# Patient Record
Sex: MALE | Race: WHITE | NOT HISPANIC OR LATINO | Employment: FULL TIME | URBAN - METROPOLITAN AREA
[De-identification: names, ages, dates, MRNs, and addresses within clinical notes are randomized per-mention and may not be internally consistent; named-entity substitution may affect disease eponyms.]

---

## 2015-03-17 LAB
CHOLEST SERPL-MCNC: 141 MG/DL (ref 0–200)
CREAT SERPL-MCNC: 0.89 MG/DL (ref 0.72–1.25)
GFR SERPL CREATININE-BSD FRML MDRD: >60 ML/MIN/1.73M2
GLUCOSE SERPL-MCNC: 120 MG/DL (ref 70–100)
HDLC SERPL-MCNC: 40 MG/DL
LDLC SERPL CALC-MCNC: 73 MG/DL (ref 0–159)
POTASSIUM SERPL-SCNC: 4.4 MMOL/L (ref 3.5–5.1)
TRIGL SERPL-MCNC: 130 MG/DL (ref 30–150)

## 2016-03-22 LAB
CREAT SERPL-MCNC: 0.82 MG/DL (ref 0.72–1.25)
GFR SERPL CREATININE-BSD FRML MDRD: >60 ML/MIN/1.73M2
POTASSIUM SERPL-SCNC: 4.1 MMOL/L (ref 3.5–5.1)

## 2017-04-18 ENCOUNTER — TRANSFERRED RECORDS (OUTPATIENT)
Dept: HEALTH INFORMATION MANAGEMENT | Facility: CLINIC | Age: 52
End: 2017-04-18

## 2017-04-18 LAB
CHOLEST SERPL-MCNC: 132 MG/DL (ref 0–200)
HDLC SERPL-MCNC: 42 MG/DL
LDLC SERPL CALC-MCNC: 67 MG/DL (ref 0–159)
TRIGL SERPL-MCNC: 114 MG/DL (ref 30–150)

## 2018-03-22 LAB — GLUCOSE SERPL-MCNC: 120 MG/DL (ref 70–100)

## 2018-04-27 NOTE — PROGRESS NOTES
"SUBJECTIVE:   CC: Delbert Dowling is an 52 year old male who presents for preventative health visit.     Physical   Annual:     Getting at least 3 servings of Calcium per day::  Yes    Bi-annual eye exam::  Yes    Dental care twice a year::  Yes    Sleep apnea or symptoms of sleep apnea::  None    Frequency of exercise::  1 day/week    Duration of exercise::  15-30 minutes    Taking medications regularly::  Yes    Medication side effects::  None    Additional concerns today::  No          New patient with prior care from [facility]. He has been with his partner for the past 13 years. He has one biological child and two step children. He is the  for  [employer].    His medical history includes hypertension and depression that is controlled with medication. His family history includes COPD and hypertension. His social history includes a 20-year history of smoking 1ppd and quit 12 years ago. He does not have a surgical history.    He reports that his weight has increased over the winter. He admits he does not follow a healthy diet. He states his alcohol intake is \"non-existent\".    Negative FIT over a year ago.         Hypertension Follow-up      Outpatient blood pressures are not being checked.    Low Salt Diet: no added salt    Depression Followup    Status since last visit: \"great\" medication is working well     See PHQ-9 for current symptoms.  Other associated symptoms: None    Complicating factors:   Significant life event:  No   Current substance abuse:  None  Anxiety or Panic symptoms:  No    PHQ-9 5/1/2018   Total Score 0   Q9: Suicide Ideation Not at all     In the past two weeks have you had thoughts of suicide or self-harm?  No.    Do you have concerns about your personal safety or the safety of others?   No  PHQ-9  English  PHQ-9   Any Language  Suicide Assessment Five-step Evaluation and Treatment (SAFE-T)    Today's PHQ-2 Score:   PHQ-2 ( 1999 [employer]) 5/1/2018   Q1: Little interest or " pleasure in doing things 0   Q2: Feeling down, depressed or hopeless 0   PHQ-2 Score 0   Q1: Little interest or pleasure in doing things Not at all   Q2: Feeling down, depressed or hopeless Not at all   PHQ-2 Score 0     Abuse: Current or Past(Physical, Sexual or Emotional)- No  Do you feel safe in your environment - Yes    Social History   Substance Use Topics     Smoking status: Former Smoker     Smokeless tobacco: Never Used     Alcohol use Yes      Comment: rare     Alcohol Use 5/1/2018   If you drink alcohol do you typically have greater than 3 drinks per day OR greater than 7 drinks per week? No       Last PSA:   PSA   Date Value Ref Range Status   05/01/2018 0.14 0 - 4 ug/L Final     Comment:     Assay Method:  Chemiluminescence using Siemens Vista analyzer       Reviewed orders with patient. Reviewed health maintenance and updated orders accordingly - Yes  BP Readings from Last 3 Encounters:   05/01/18 124/84    Wt Readings from Last 3 Encounters:   05/01/18 (!) 367 lb (166.5 kg)         Patient Active Problem List   Diagnosis     Episode of recurrent major depressive disorder, unspecified depression episode severity (H)     Essential hypertension     Morbid obesity (H)     Snoring     Past Surgical History:   Procedure Laterality Date     NO HISTORY OF SURGERY         Social History   Substance Use Topics     Smoking status: Former Smoker     Smokeless tobacco: Never Used     Alcohol use Yes      Comment: rare     Family History   Problem Relation Age of Onset     Chronic Obstructive Pulmonary Disease Mother      Hypertension Father          Current Outpatient Prescriptions   Medication Sig Dispense Refill     atenolol (TENORMIN) 100 MG tablet Take 1 tablet (100 mg) by mouth daily 90 tablet 3     buPROPion (WELLBUTRIN XL) 150 MG 24 hr tablet Take 1 tablet (150 mg) by mouth daily 90 tablet 3     citalopram (CELEXA) 40 MG tablet Take 1 tablet (40 mg) by mouth daily 90 tablet 3     hydrochlorothiazide  "(HYDRODIURIL) 25 MG tablet TAKE 1 TAB BY MOUTH ONCE DAILY IN THE MORNING 90 tablet 3     Multiple Vitamins-Minerals (MULTIVITAMIN ADULT PO)        Allergies   Allergen Reactions     Lisinopril Cough     Recent Labs   Lab Test  05/01/18   0832   LDL  63   HDL  47   TRIG  127   CR  0.83   GFRESTIMATED  >90   GFRESTBLACK  >90   POTASSIUM  3.9        Reviewed and updated as needed this visit by clinical staff  Tobacco  Allergies  Meds  Problems  Med Hx  Surg Hx  Fam Hx  Soc Hx          Reviewed and updated as needed this visit by Provider  Allergies  Meds  Problems            Review of Systems  C: NEGATIVE for fever, chills, change in weight  I: NEGATIVE for worrisome rashes, moles or lesions  E: NEGATIVE for vision changes or irritation  ENT: NEGATIVE for ear, mouth and throat problems  R: NEGATIVE for significant cough or SOB  CV: NEGATIVE for chest pain, palpitations or peripheral edema  GI: NEGATIVE for nausea, abdominal pain, heartburn, or change in bowel habits   male: negative for dysuria, hematuria, decreased urinary stream, erectile dysfunction, urethral discharge  M: NEGATIVE for significant arthralgias or myalgia  N: NEGATIVE for weakness, dizziness or paresthesias  P: NEGATIVE for changes in mood or affect    This document serves as a record of the services and decisions personally performed and made by Lissa Gamez CNP. It was created on her behalf by Yana Deutsch, a trained medical scribe. The creation of this document is based the provider's statements to the medical scribe.    Yana Deutsch May 1, 2018 7:57 AM    OBJECTIVE:   /84  Pulse 70  Temp 97  F (36.1  C) (Temporal)  Resp 12  Ht 5' 11.5\" (1.816 m)  Wt (!) 367 lb (166.5 kg)  BMI 50.47 kg/m2    Physical Exam  GENERAL: healthy, alert and no distress  EYES: Eyes grossly normal to inspection, PERRL and conjunctivae and sclerae normal  HENT: ear canals and TM's normal, nose and mouth without ulcers or lesions  NECK: no adenopathy, no " asymmetry, masses, or scars and thyroid normal to palpation  RESP: lungs clear to auscultation - no rales, rhonchi or wheezes  CV: regular rate and rhythm, normal S1 S2, no S3 or S4, no murmur, click or rub, no peripheral edema and peripheral pulses strong  ABDOMEN: soft, nontender, no hepatosplenomegaly, no masses and bowel sounds normal   (male): normal male genitalia without lesions or urethral discharge, no hernia  MS: no gross musculoskeletal defects noted, no edema  SKIN: no suspicious lesions or rashes  NEURO: Normal strength and tone, mentation intact and speech normal  PSYCH: mentation appears normal, affect normal/bright    ASSESSMENT/PLAN:       ICD-10-CM    1. Encounter for routine adult medical exam with abnormal findings Z00.01 **HIV Antigen Antibody Combo FUTURE 2mo     Lipid panel reflex to direct LDL Fasting     Basic metabolic panel     Prostate spec antigen screen     GASTROENTEROLOGY ADULT REF PROCEDURE ONLY [facility] [phone]; [place] Provider     CANCELED: GLUCOSE   2. Morbid obesity (H) E66.01    3. Essential hypertension I10 atenolol (TENORMIN) 100 MG tablet     hydrochlorothiazide (HYDRODIURIL) 25 MG tablet   4. Episode of recurrent major depressive disorder, unspecified depression episode severity (H) F33.9 citalopram (CELEXA) 40 MG tablet     buPROPion (WELLBUTRIN XL) 150 MG 24 hr tablet   5. Snoring R06.83 SLEEP EVALUATION & MANAGEMENT REFERRAL - ADULT -[facility] [place]  [phone] (Age 15 and up)   [facility]. He presents today for a routine physical that was completed today. Reviewed medical, family, and social history at length with patient today and was updated in Epic as needed. Fasting labs ordered for patient to complete today. Will notify with results via [employer].    Hypertension: Chronic, stable, well controlled, continue current medication, refill done if needed    Depression: Doing well. Tolerating  "medication. Medication refill provided today.    Weight management discussed. Urged to develop healthy habits, good nutrition, and increase physical activity. Offered a referral to see a nutritionist, but Pt declined and will work on weight management on his own.     Sleep study referral placed due to snoring. Also discussed health risks of snoring and that his elevated BMI is a contributing factor to his snoring. He will work on weight management.    Discussed need for colonoscopy that was scheduled today.  Discussed Shingrix vaccine and encouraged to complete this through his pharmacy.        COUNSELING:   Reviewed preventive health counseling, as reflected in patient instructions     reports that he has quit smoking. He has never used smokeless tobacco.    Estimated body mass index is 50.47 kg/(m^2) as calculated from the following:    Height as of this encounter: 5' 11.5\" (1.816 m).    Weight as of this encounter: 367 lb (166.5 kg).   Weight management plan: Discussed healthy diet and exercise guidelines and patient will follow up in 12 months in clinic to re-evaluate.    Counseling Resources:  ATP IV Guidelines  Pooled Cohorts Equation Calculator  FRAX Risk Assessment  ICSI Preventive Guidelines  Dietary Guidelines for Americans, 2010  USDA's MyPlate  ASA Prophylaxis  Lung CA Screening    The information in this document, created by the medical scribe for me, accurately reflects the services I personally performed and the decisions made by me. I have reviewed and approved this document for accuracy prior to leaving the patient care area.    NANDO [facility]  "

## 2018-05-01 ENCOUNTER — OFFICE VISIT (OUTPATIENT)
Dept: FAMILY MEDICINE | Facility: CLINIC | Age: 53
End: 2018-05-01
Payer: COMMERCIAL

## 2018-05-01 ENCOUNTER — TELEPHONE (OUTPATIENT)
Dept: SURGERY | Facility: CLINIC | Age: 53
End: 2018-05-01

## 2018-05-01 ENCOUNTER — TELEPHONE (OUTPATIENT)
Dept: FAMILY MEDICINE | Facility: CLINIC | Age: 53
End: 2018-05-01

## 2018-05-01 VITALS
SYSTOLIC BLOOD PRESSURE: 124 MMHG | BODY MASS INDEX: 42.66 KG/M2 | TEMPERATURE: 97 F | DIASTOLIC BLOOD PRESSURE: 84 MMHG | WEIGHT: 315 LBS | HEIGHT: 72 IN | HEART RATE: 70 BPM | RESPIRATION RATE: 12 BRPM

## 2018-05-01 DIAGNOSIS — R06.83 SNORING: ICD-10-CM

## 2018-05-01 DIAGNOSIS — E66.01 MORBID OBESITY (H): ICD-10-CM

## 2018-05-01 DIAGNOSIS — Z00.01 ENCOUNTER FOR ROUTINE ADULT MEDICAL EXAM WITH ABNORMAL FINDINGS: Primary | ICD-10-CM

## 2018-05-01 DIAGNOSIS — F33.9 EPISODE OF RECURRENT MAJOR DEPRESSIVE DISORDER, UNSPECIFIED DEPRESSION EPISODE SEVERITY (H): ICD-10-CM

## 2018-05-01 DIAGNOSIS — I10 ESSENTIAL HYPERTENSION: ICD-10-CM

## 2018-05-01 LAB
ANION GAP SERPL CALCULATED.3IONS-SCNC: 5 MMOL/L (ref 3–14)
BUN SERPL-MCNC: 16 MG/DL (ref 7–30)
CALCIUM SERPL-MCNC: 9 MG/DL (ref 8.5–10.1)
CHLORIDE SERPL-SCNC: 105 MMOL/L (ref 94–109)
CHOLEST SERPL-MCNC: 135 MG/DL
CO2 SERPL-SCNC: 30 MMOL/L (ref 20–32)
CREAT SERPL-MCNC: 0.83 MG/DL (ref 0.66–1.25)
GFR SERPL CREATININE-BSD FRML MDRD: >90 ML/MIN/1.7M2
GLUCOSE SERPL-MCNC: 114 MG/DL (ref 70–99)
HDLC SERPL-MCNC: 47 MG/DL
LDLC SERPL CALC-MCNC: 63 MG/DL
NONHDLC SERPL-MCNC: 88 MG/DL
POTASSIUM SERPL-SCNC: 3.9 MMOL/L (ref 3.4–5.3)
PSA SERPL-ACNC: 0.14 UG/L (ref 0–4)
SODIUM SERPL-SCNC: 140 MMOL/L (ref 133–144)
TRIGL SERPL-MCNC: 127 MG/DL

## 2018-05-01 PROCEDURE — G0103 PSA SCREENING: HCPCS | Performed by: NURSE PRACTITIONER

## 2018-05-01 PROCEDURE — 80048 BASIC METABOLIC PNL TOTAL CA: CPT | Performed by: NURSE PRACTITIONER

## 2018-05-01 PROCEDURE — 87389 HIV-1 AG W/HIV-1&-2 AB AG IA: CPT | Performed by: NURSE PRACTITIONER

## 2018-05-01 PROCEDURE — 99386 PREV VISIT NEW AGE 40-64: CPT | Performed by: NURSE PRACTITIONER

## 2018-05-01 PROCEDURE — 36415 COLL VENOUS BLD VENIPUNCTURE: CPT | Performed by: NURSE PRACTITIONER

## 2018-05-01 PROCEDURE — 80061 LIPID PANEL: CPT | Performed by: NURSE PRACTITIONER

## 2018-05-01 RX ORDER — CITALOPRAM HYDROBROMIDE 40 MG/1
40 TABLET ORAL DAILY
COMMUNITY
Start: 2018-04-09 | End: 2018-05-01

## 2018-05-01 RX ORDER — ATENOLOL 100 MG/1
100 TABLET ORAL DAILY
COMMUNITY
Start: 2018-04-09 | End: 2018-05-01

## 2018-05-01 RX ORDER — HYDROCHLOROTHIAZIDE 25 MG/1
TABLET ORAL
Qty: 90 TABLET | Refills: 3 | Status: SHIPPED | OUTPATIENT
Start: 2018-05-01 | End: 2019-03-13

## 2018-05-01 RX ORDER — CITALOPRAM HYDROBROMIDE 40 MG/1
40 TABLET ORAL DAILY
Qty: 90 TABLET | Refills: 3 | Status: SHIPPED | OUTPATIENT
Start: 2018-05-01 | End: 2019-03-13

## 2018-05-01 RX ORDER — BUPROPION HYDROCHLORIDE 150 MG/1
150 TABLET ORAL DAILY
COMMUNITY
Start: 2018-04-09 | End: 2018-05-01

## 2018-05-01 RX ORDER — HYDROCHLOROTHIAZIDE 25 MG/1
TABLET ORAL
COMMUNITY
Start: 2018-04-09 | End: 2018-05-01

## 2018-05-01 RX ORDER — ATENOLOL 100 MG/1
100 TABLET ORAL DAILY
Qty: 90 TABLET | Refills: 3 | Status: SHIPPED | OUTPATIENT
Start: 2018-05-01 | End: 2019-03-13

## 2018-05-01 RX ORDER — BUPROPION HYDROCHLORIDE 150 MG/1
150 TABLET ORAL DAILY
Qty: 90 TABLET | Refills: 3 | Status: SHIPPED | OUTPATIENT
Start: 2018-05-01 | End: 2019-03-13

## 2018-05-01 ASSESSMENT — PAIN SCALES - GENERAL: PAINLEVEL: NO PAIN (0)

## 2018-05-01 NOTE — MR AVS SNAPSHOT
After Visit Summary   5/1/2018    Delbert Dowling    MRN: [id number]           Patient Information     Date Of Birth          1965        Visit Information        Provider Department      5/1/2018 7:40 AM Lissa Gamez APRN [facility]        Today's Diagnoses     Morbid obesity (H)    -  1    Encounter for routine adult medical exam with abnormal findings        Essential hypertension        Episode of recurrent major depressive disorder, unspecified depression episode severity (H)        Snoring          Care Instructions      Preventive Health Recommendations  Male Ages 50 - 64    Yearly exam:             See your health care provider every year in order to  o   Review health changes.   o   Discuss preventive care.    o   Review your medicines if your doctor has prescribed any.     Have a cholesterol test every 5 years, or more frequently if you are at risk for high cholesterol/heart disease.     Have a diabetes test (fasting glucose) every three years. If you are at risk for diabetes, you should have this test more often.     Have a colonoscopy at age 50, or have a yearly FIT test (stool test). These exams will check for colon cancer.      Talk with your health care provider about whether or not a prostate cancer screening test (PSA) is right for you.    You should be tested each year for STDs (sexually transmitted diseases), if you re at risk.     Shots: Get a flu shot each year. Get a tetanus shot every 10 years.     Nutrition:    Eat at least 5 servings of fruits and vegetables daily.     Eat whole-grain bread, whole-wheat pasta and brown rice instead of white grains and rice.     Talk to your provider about Calcium and Vitamin D.     Lifestyle    Exercise for at least 150 minutes a week (30 minutes a day, 5 days a week). This will help you control your weight and prevent disease.     Limit alcohol to one drink per day.     No smoking.     Wear sunscreen to prevent skin  cancer.     See your dentist every six months for an exam and cleaning.     See your eye doctor every 1 to 2 years.            Follow-ups after your visit        Additional Services     GASTROENTEROLOGY ADULT REF PROCEDURE ONLY [facility] [phone]; [place] Provider       Last Lab Result: No results found for: CR  Body mass index is 50.47 kg/(m^2).    Wants a Monday procedure.     Patient will be contacted to schedule procedure.     Please be aware that coverage of these services is subject to the terms and limitations of your health insurance plan.  Call member services at your health plan with any benefit or coverage questions.  Any procedures must be performed at a [place] facility OR coordinated by your clinic's referral office.    Please bring the following with you to your appointment:    (1) Any X-Rays, CTs or MRIs which have been performed.  Contact the facility where they were done to arrange for  prior to your scheduled appointment.    (2) List of current medications   (3) This referral request   (4) Any documents/labs given to you for this referral            SLEEP EVALUATION & MANAGEMENT REFERRAL - [facility]  [phone] (Age 15 and up)       Please be aware that coverage of these services is subject to the terms and limitations of your health insurance plan.  Call member services at your health plan with any benefit or coverage questions.      Please bring the following to your appointment:    >>   List of current medications   >>   This referral request   >>   Any documents/labs given to you for this referral                      Future tests that were ordered for you today     Open Future Orders        Priority Expected Expires Ordered    SLEEP EVALUATION & MANAGEMENT REFERRAL - [facility]  [phone] (Age 15 and up) Routine  5/1/2019 5/1/2018            Who to contact     If you have questions or need  "follow up information about today's clinic visit or your schedule please contact [facility] directly at [phone].  Normal or non-critical lab and imaging results will be communicated to you by [employer]hart, letter or phone within 4 business days after the clinic has received the results. If you do not hear from us within 7 days, please contact the clinic through [employer]hart or phone. If you have a critical or abnormal lab result, we will notify you by phone as soon as possible.  Submit refill requests through [employer] or call your pharmacy and they will forward the refill request to us. Please allow 3 business days for your refill to be completed.          Additional Information About Your Visit        [employer] Information     [employer] gives you secure access to your electronic health record. If you see a primary care provider, you can also send messages to your care team and make appointments. If you have questions, please call your primary care clinic.  If you do not have a primary care provider, please call [phone] and they will assist you.        Care EveryWhere ID     This is your Care EveryWhere ID. This could be used by other organizations to access your [facility] medical records  [id number]        Your Vitals Were     Pulse Temperature Respirations Height BMI (Body Mass Index)       70 97  F (36.1  C) (Temporal) 12 5' 11.5\" (1.816 m) 50.47 kg/m2        Blood Pressure from Last 3 Encounters:   05/01/18 124/84    Weight from Last 3 Encounters:   05/01/18 (!) 367 lb (166.5 kg)              We Performed the Following     **HIV Antigen Antibody Combo FUTURE 2mo     Basic metabolic panel     GASTROENTEROLOGY ADULT REF PROCEDURE ONLY [facility] [phone]; [place] Provider     Lipid panel reflex to direct LDL Fasting     Prostate spec antigen screen          Today's Medication Changes          These changes are accurate as of 5/1/18  8:29 AM.  If you have any questions, ask your nurse or " doctor.               These medicines have changed or have updated prescriptions.        Dose/Directions    hydrochlorothiazide 25 MG tablet   Commonly known as:  HYDRODIURIL   This may have changed:  See the new instructions.   Used for:  Essential hypertension   Changed by:  Lissa Gamez APRN CNP        TAKE 1 TAB BY MOUTH ONCE DAILY IN THE MORNING   Quantity:  90 tablet   Refills:  3            Where to get your medicines      These medications were sent to [facility] [address] [facility] - [place], [place] - [address]  [address], [facility] [address]     Phone:  [phone]     atenolol 100 MG tablet    buPROPion 150 MG 24 hr tablet    citalopram 40 MG tablet    hydrochlorothiazide 25 MG tablet                Primary Care Provider Office Phone # Fax #    NANDO Lorenzo [phone] [address] [facility] [address]        Equal Access to Services     [facility]: Hadii aad ku hadasho Soomaali, waaxda luqadaha, qaybta kaalmada adeegyada, sandra hickmanin hayaatani camargo . So [facility] [phone].    ATENCIÓN: Si habla español, tiene a klein disposición servicios gratuitos de asistencia lingüística. LlKettering Health Preble [phone].    We comply with applicable federal civil rights laws and [place] laws. We do not discriminate on the basis of race, color, national origin, age, disability, sex, sexual orientation, or gender identity.            Thank you!     Thank you for choosing [facility]  for your care. Our goal is always to provide you with excellent care. Hearing back from our patients is one way we can continue to improve our services. Please take a few minutes to complete the written survey that you may receive in the mail after your visit with us. Thank you!             Your Updated Medication List - Protect others around you: Learn how to safely use, store and throw away your medicines at www.disposemymeds.org.          This list is accurate as of 5/1/18  8:29 AM.  Always  use your most recent med list.                   Brand Name Dispense Instructions for use Diagnosis    atenolol 100 MG tablet    TENORMIN    90 tablet    Take 1 tablet (100 mg) by mouth daily    Essential hypertension       buPROPion 150 MG 24 hr tablet    WELLBUTRIN XL    90 tablet    Take 1 tablet (150 mg) by mouth daily    Episode of recurrent major depressive disorder, unspecified depression episode severity (H)       citalopram 40 MG tablet    celeXA    90 tablet    Take 1 tablet (40 mg) by mouth daily    Episode of recurrent major depressive disorder, unspecified depression episode severity (H)       hydrochlorothiazide 25 MG tablet    HYDRODIURIL    90 tablet    TAKE 1 TAB BY MOUTH ONCE DAILY IN THE MORNING    Essential hypertension       MULTIVITAMIN ADULT PO

## 2018-05-01 NOTE — TELEPHONE ENCOUNTER
Pa from [facility] called to schedule a colonoscopy for patient. Orders placed by Renaldo. Dx: screening. Patient is scheduled on 5/7/2018 @ 230 w/Bobby, arriving @ 130. Pa stated she would provide the prep instructions.

## 2018-05-02 ENCOUNTER — TELEPHONE (OUTPATIENT)
Dept: FAMILY MEDICINE | Facility: CLINIC | Age: 53
End: 2018-05-02

## 2018-05-02 PROBLEM — R06.83 SNORING: Status: ACTIVE | Noted: 2018-05-02

## 2018-05-02 LAB — HIV 1+2 AB+HIV1 P24 AG SERPL QL IA: NONREACTIVE

## 2018-05-02 ASSESSMENT — PATIENT HEALTH QUESTIONNAIRE - PHQ9: SUM OF ALL RESPONSES TO PHQ QUESTIONS 1-9: 0

## 2018-05-07 ENCOUNTER — HOSPITAL ENCOUNTER (OUTPATIENT)
Facility: CLINIC | Age: 53
Discharge: HOME | End: 2018-05-07
Attending: INTERNAL MEDICINE | Admitting: INTERNAL MEDICINE
Payer: COMMERCIAL

## 2018-05-07 ENCOUNTER — SURGERY (OUTPATIENT)
Age: 53
End: 2018-05-07

## 2018-05-07 VITALS
DIASTOLIC BLOOD PRESSURE: 71 MMHG | OXYGEN SATURATION: 93 % | HEART RATE: 69 BPM | SYSTOLIC BLOOD PRESSURE: 124 MMHG | TEMPERATURE: 98 F | RESPIRATION RATE: 16 BRPM

## 2018-05-07 LAB — COLONOSCOPY: NORMAL

## 2018-05-07 PROCEDURE — 25000128 H RX IP 250 OP 636: Performed by: INTERNAL MEDICINE

## 2018-05-07 PROCEDURE — 45385 COLONOSCOPY W/LESION REMOVAL: CPT | Mod: PT | Performed by: INTERNAL MEDICINE

## 2018-05-07 PROCEDURE — 88305 TISSUE EXAM BY PATHOLOGIST: CPT | Mod: 26 | Performed by: INTERNAL MEDICINE

## 2018-05-07 PROCEDURE — G0500 MOD SEDAT ENDO SERVICE >5YRS: HCPCS

## 2018-05-07 PROCEDURE — 40000296 ZZH STATISTIC ENDO RECOVERY CLASS 1:2 FIRST HOUR: Performed by: INTERNAL MEDICINE

## 2018-05-07 PROCEDURE — 88305 TISSUE EXAM BY PATHOLOGIST: CPT | Performed by: INTERNAL MEDICINE

## 2018-05-07 RX ORDER — FENTANYL CITRATE 50 UG/ML
INJECTION, SOLUTION INTRAMUSCULAR; INTRAVENOUS PRN
Status: DISCONTINUED | OUTPATIENT
Start: 2018-05-07 | End: 2018-05-07 | Stop reason: HOSPADM

## 2018-05-07 RX ORDER — ONDANSETRON 2 MG/ML
4 INJECTION INTRAMUSCULAR; INTRAVENOUS
Status: DISCONTINUED | OUTPATIENT
Start: 2018-05-07 | End: 2018-05-07 | Stop reason: HOSPADM

## 2018-05-07 RX ORDER — LIDOCAINE 40 MG/G
CREAM TOPICAL
Status: DISCONTINUED | OUTPATIENT
Start: 2018-05-07 | End: 2018-05-07 | Stop reason: HOSPADM

## 2018-05-07 RX ADMIN — FENTANYL CITRATE 50 MCG: 50 INJECTION, SOLUTION INTRAMUSCULAR; INTRAVENOUS at 14:52

## 2018-05-07 RX ADMIN — MIDAZOLAM 1 MG: 1 INJECTION INTRAMUSCULAR; INTRAVENOUS at 14:53

## 2018-05-07 RX ADMIN — MIDAZOLAM 1 MG: 1 INJECTION INTRAMUSCULAR; INTRAVENOUS at 14:55

## 2018-05-07 RX ADMIN — MIDAZOLAM 2 MG: 1 INJECTION INTRAMUSCULAR; INTRAVENOUS at 14:52

## 2018-05-07 RX ADMIN — MIDAZOLAM 1 MG: 1 INJECTION INTRAMUSCULAR; INTRAVENOUS at 14:54

## 2018-05-07 NOTE — CONSULTS
[facility] GI Pre-Procedure Physical Assessment    Delbert Dowling MRN# [id number]   Age: 52 year old YOB: 1965      Date of Surgery: 5/7/2018  Location [facility]      Date of Exam 5/7/2018 Facility (Same day)       Primary care provider: Lissa Gamez         Active problem list:   Patient Active Problem List   Diagnosis     Episode of recurrent major depressive disorder, unspecified depression episode severity (H)     Essential hypertension     Morbid obesity (H)     Snoring            Medications (include herbals and vitamins):   Any Plavix use in the last 7 days?  No     Current Facility-Administered Medications   Medication     lidocaine (LMX4) kit     lidocaine 1 % 1 mL     ondansetron (ZOFRAN) injection 4 mg     sodium chloride (PF) 0.9% PF flush 3 mL     sodium chloride (PF) 0.9% PF flush 3 mL             Allergies:      Allergies   Allergen Reactions     Lisinopril Cough     Allergy to Latex?  No  Allergy to tape?    No          Social History:     Social History   Substance Use Topics     Smoking status: Former Smoker     Smokeless tobacco: Never Used     Alcohol use Yes      Comment: rare            Physical Exam:   All vitals have been reviewed  Blood pressure 121/68, pulse 63, temperature 98  F (36.7  C), temperature source Oral, resp. rate 16, SpO2 94 %.  Airway assessment:   Patient is able to stick out tongue      Lungs:   No increased work of breathing, good air exchange, clear to auscultation bilaterally, no crackles or wheezing      Cardiovascular:   Normal apical impulse, regular rate and rhythm, normal S1 and S2, no S3 or S4, and no murmur noted           Lab / Radiology Results:   All laboratory data reviewed          Assessment:   Appropriately NPO  Chief complaint or anatomic assessment of involved area: Screening         Plan:   Moderate (conscious) sedation     Risks, benefits, alternatives to sedation and blood explained and consent obtained  Risks,  benefits, alternatives to procedure explained and consent obtained  Orders and progress notes are in the chart  Discharge from Phase 1 and / or Phase 2 recovery when patient meets criteria    I have reviewed the history and physical, lab finding(s), diagnostic data, medicaitons, and the plan for sedation.  I have determined this patient to be an appropriate candidate for the planned sedation / procedure and have reassessed the patient immediately prior to sedation / procedure.    I have personally and medically directed the administration of medications used.    Dio Rosales MD, MD

## 2018-05-09 LAB — COPATH REPORT: NORMAL

## 2019-01-24 ENCOUNTER — TELEPHONE (OUTPATIENT)
Dept: FAMILY MEDICINE | Facility: CLINIC | Age: 54
End: 2019-01-24

## 2019-01-24 NOTE — TELEPHONE ENCOUNTER
Reason for call:  Form  Reason for Call:  Form, our goal is to have forms completed with 72 hours, however, some forms may require a visit or additional information.    Type of letter, form or note:  Medical    Who is the form from?: [employer]      Where did the form come from: form was faxed in    What clinic location was the form placed at?: [facility] - [phone]    Where the form was placed: 's in box     What number is listed as a contact on the form?: [phone]       Additional comments: Fax back to [phone]    Call taken on 1/24/2019 at 2:10 PM by Gerry Acevedo

## 2019-03-13 ENCOUNTER — TELEPHONE (OUTPATIENT)
Dept: FAMILY MEDICINE | Facility: CLINIC | Age: 54
End: 2019-03-13

## 2019-03-13 ENCOUNTER — OFFICE VISIT (OUTPATIENT)
Dept: FAMILY MEDICINE | Facility: CLINIC | Age: 54
End: 2019-03-13
Payer: COMMERCIAL

## 2019-03-13 ENCOUNTER — ANCILLARY PROCEDURE (OUTPATIENT)
Dept: GENERAL RADIOLOGY | Facility: CLINIC | Age: 54
End: 2019-03-13
Attending: NURSE PRACTITIONER
Payer: COMMERCIAL

## 2019-03-13 VITALS
DIASTOLIC BLOOD PRESSURE: 84 MMHG | SYSTOLIC BLOOD PRESSURE: 124 MMHG | TEMPERATURE: 97.7 F | OXYGEN SATURATION: 97 % | HEIGHT: 72 IN | RESPIRATION RATE: 18 BRPM | WEIGHT: 315 LBS | BODY MASS INDEX: 42.66 KG/M2 | HEART RATE: 66 BPM

## 2019-03-13 DIAGNOSIS — I10 ESSENTIAL HYPERTENSION: ICD-10-CM

## 2019-03-13 DIAGNOSIS — S99.912A ANKLE INJURY, LEFT, INITIAL ENCOUNTER: ICD-10-CM

## 2019-03-13 DIAGNOSIS — S82.202A CLOSED FRACTURE OF LEFT TIBIA AND FIBULA, INITIAL ENCOUNTER: Primary | ICD-10-CM

## 2019-03-13 DIAGNOSIS — F33.9 EPISODE OF RECURRENT MAJOR DEPRESSIVE DISORDER, UNSPECIFIED DEPRESSION EPISODE SEVERITY (H): ICD-10-CM

## 2019-03-13 DIAGNOSIS — S82.402A CLOSED FRACTURE OF LEFT TIBIA AND FIBULA, INITIAL ENCOUNTER: Primary | ICD-10-CM

## 2019-03-13 DIAGNOSIS — E66.01 MORBID OBESITY (H): ICD-10-CM

## 2019-03-13 PROCEDURE — 73610 X-RAY EXAM OF ANKLE: CPT | Mod: LT

## 2019-03-13 PROCEDURE — 99214 OFFICE O/P EST MOD 30 MIN: CPT | Performed by: NURSE PRACTITIONER

## 2019-03-13 RX ORDER — CITALOPRAM HYDROBROMIDE 40 MG/1
40 TABLET ORAL DAILY
Qty: 90 TABLET | Refills: 0 | Status: SHIPPED | OUTPATIENT
Start: 2019-03-13 | End: 2019-06-25

## 2019-03-13 RX ORDER — HYDROCHLOROTHIAZIDE 25 MG/1
TABLET ORAL
Qty: 90 TABLET | Refills: 0 | Status: SHIPPED | OUTPATIENT
Start: 2019-03-13 | End: 2019-06-25

## 2019-03-13 RX ORDER — ATENOLOL 100 MG/1
100 TABLET ORAL DAILY
Qty: 90 TABLET | Refills: 0 | Status: SHIPPED | OUTPATIENT
Start: 2019-03-13 | End: 2019-06-25

## 2019-03-13 RX ORDER — BUPROPION HYDROCHLORIDE 150 MG/1
150 TABLET ORAL DAILY
Qty: 90 TABLET | Refills: 0 | Status: SHIPPED | OUTPATIENT
Start: 2019-03-13 | End: 2019-06-25

## 2019-03-13 RX ORDER — HYDROCODONE BITARTRATE AND ACETAMINOPHEN 5; 325 MG/1; MG/1
1-2 TABLET ORAL EVERY 6 HOURS PRN
Qty: 10 TABLET | Refills: 0 | Status: ON HOLD | OUTPATIENT
Start: 2019-03-13 | End: 2019-03-22

## 2019-03-13 ASSESSMENT — ANXIETY QUESTIONNAIRES
3. WORRYING TOO MUCH ABOUT DIFFERENT THINGS: NOT AT ALL
1. FEELING NERVOUS, ANXIOUS, OR ON EDGE: NOT AT ALL
2. NOT BEING ABLE TO STOP OR CONTROL WORRYING: NOT AT ALL
6. BECOMING EASILY ANNOYED OR IRRITABLE: NOT AT ALL
7. FEELING AFRAID AS IF SOMETHING AWFUL MIGHT HAPPEN: NOT AT ALL
IF YOU CHECKED OFF ANY PROBLEMS ON THIS QUESTIONNAIRE, HOW DIFFICULT HAVE THESE PROBLEMS MADE IT FOR YOU TO DO YOUR WORK, TAKE CARE OF THINGS AT HOME, OR GET ALONG WITH OTHER PEOPLE: NOT DIFFICULT AT ALL
GAD7 TOTAL SCORE: 0
5. BEING SO RESTLESS THAT IT IS HARD TO SIT STILL: NOT AT ALL

## 2019-03-13 ASSESSMENT — PATIENT HEALTH QUESTIONNAIRE - PHQ9
SUM OF ALL RESPONSES TO PHQ QUESTIONS 1-9: 0
5. POOR APPETITE OR OVEREATING: NOT AT ALL

## 2019-03-13 ASSESSMENT — MIFFLIN-ST. JEOR: SCORE: 2561.09

## 2019-03-13 ASSESSMENT — PAIN SCALES - GENERAL: PAINLEVEL: MODERATE PAIN (4)

## 2019-03-13 NOTE — TELEPHONE ENCOUNTER
The crutch was given to patient while walking out to the lobby the crutch broke, patient fell. Called the  home medical equipment and informed them. Per the  home medical equipment pt need heavy duty crutch. Pt was informed and understood. Pt states that he do not need a new crutch and will borrow from family members.     Kolby Ceron MA

## 2019-03-13 NOTE — PROGRESS NOTES
SUBJECTIVE:   Delbert Dowling is a 53 year old male who presents to clinic today for the following health issues:    HPI     Left ankle     Onset: This morning  At 6:30 AM    Description: Patient slipped down his front steps outside.   Location: left ankle  Character: Sharp pain while moving. Throbbing pain while sitting.     Intensity: 4/10    Progression of Symptoms: worse    Accompanying Signs & Symptoms:  Other symptoms: no tingling , no numbness.     History:   Previous similar pain: no       Precipitating factors:   Trauma or overuse: YES    Alleviating factors:  Improved by:  Ice and Naproxen.     Therapies Tried and outcome: Ice and Naproxen.     Left Ankle Injury   He is able to get on the exam table independently. When he felt like his toe got stuck on one of the steps and his whole foot moved back, and leg tucked under. He is feeling numbness in his toes it is painful when he is standing on it  No foot pain.    He was heading to downstairs. He has not injured left ankle in the past. Pt has a sedentary lifestyle.     Meds/Screenings   Pt wanted to come in for a med check this month either way. He only had a coffee today, he will make an appointment for May. Pt has completed his colonoscopy, which went well.     Hypertension Follow-up      Outpatient blood pressures are not being checked.    Low Salt Diet: no added salt    Problem list and histories reviewed & adjusted, as indicated.  Additional history: as documented    Patient Active Problem List   Diagnosis     Episode of recurrent major depressive disorder, unspecified depression episode severity (H)     Essential hypertension     Morbid obesity (H)     Snoring     Closed fracture of left tibia and fibula, initial encounter     Past Surgical History:   Procedure Laterality Date     NO HISTORY OF SURGERY         Social History     Tobacco Use     Smoking status: Former Smoker     Packs/day: 1.00     Years: 20.00     Pack years: 20.00     Smokeless tobacco:  Never Used     Tobacco comment: quit 13 years    Substance Use Topics     Alcohol use: Yes     Comment: none      Family History   Problem Relation Age of Onset     Chronic Obstructive Pulmonary Disease Mother      Hypertension Father          Current Outpatient Medications   Medication Sig Dispense Refill     atenolol (TENORMIN) 100 MG tablet Take 1 tablet (100 mg) by mouth daily 90 tablet 0     buPROPion (WELLBUTRIN XL) 150 MG 24 hr tablet Take 1 tablet (150 mg) by mouth daily 90 tablet 0     citalopram (CELEXA) 40 MG tablet Take 1 tablet (40 mg) by mouth daily 90 tablet 0     hydrochlorothiazide (HYDRODIURIL) 25 MG tablet TAKE 1 TAB BY MOUTH ONCE DAILY IN THE MORNING 90 tablet 0     HYDROcodone-acetaminophen (NORCO) 5-325 MG tablet Take 1-2 tablets by mouth every 6 hours as needed for pain 10 tablet 0     Multiple Vitamins-Minerals (MULTIVITAMIN ADULT PO)        Allergies   Allergen Reactions     Lisinopril Cough     Recent Labs   Lab Test 05/01/18  0832 04/18/17 03/22/16 03/17/15   LDL 63 67  --  73   HDL 47 42  --  40   TRIG 127 114  --  130   CR 0.83  --  0.82 0.89   GFRESTIMATED >90  --  >60 >60   GFRESTBLACK >90  --   --  >60   POTASSIUM 3.9  --  4.1 4.4      BP Readings from Last 3 Encounters:   03/13/19 124/84   05/07/18 124/71   05/01/18 124/84    Wt Readings from Last 3 Encounters:   03/13/19 (!) 168.1 kg (370 lb 9.6 oz)   05/01/18 (!) 166.5 kg (367 lb)        Labs reviewed in EPIC    ROS:  Constitutional, neuro, ENT, endocrine, pulmonary, cardiac, gastrointestinal, genitourinary, musculoskeletal, integument and psychiatric systems are negative, except as otherwise noted.    This document serves as a record of the services and decisions personally performed and made by Lissa Gamez CNP. It was created on his/her behalf by Alicia Esquivel, trained medical scribe. The creation of this document is based the provider's statements to the medical scribes.    Scribe Alicia Esquivel 8:13 AM, March 13,  "2019    OBJECTIVE:                                                    /84   Pulse 66   Temp 97.7  F (36.5  C) (Oral)   Resp 18   Ht 1.816 m (5' 11.5\")   Wt (!) 168.1 kg (370 lb 9.6 oz)   SpO2 97%   BMI 50.97 kg/m    Body mass index is 50.97 kg/m .  GENERAL APPEARANCE: healthy, alert and no distress  MS: Left ankle: tender in the distal fibula above the meliolus, moderate swelling, no ecchymosis has yet appeared, good ROM,  large antalgic gate, not able to bear weight, no metatarsal tenderness  NEURO: Normal strength and tone, mentation intact and speech normal  PSYCH: mentation appears normal and affect normal/bright    Diagnostic test results:  No results found for this or any previous visit (from the past 24 hour(s)).     ASSESSMENT/PLAN:                                                        ICD-10-CM    1. Closed fracture of left tibia and fibula, initial encounter S82.202A HYDROcodone-acetaminophen (NORCO) 5-325 MG tablet    S82.402A ORTHO  REFERRAL   2. Morbid obesity (H) E66.01    3. Episode of recurrent major depressive disorder, unspecified depression episode severity (H) F33.9 buPROPion (WELLBUTRIN XL) 150 MG 24 hr tablet     citalopram (CELEXA) 40 MG tablet   4. Ankle injury, left, initial encounter S99.912A XR Ankle Left G/E 3 Views   5. Essential hypertension I10 atenolol (TENORMIN) 100 MG tablet     hydrochlorothiazide (HYDRODIURIL) 25 MG tablet     Left ankle injury, X-ray of left ankle completed, fractured distal fibula above the metatarsal.    Demonstrated home care and edema control, recommended staying home and using crutches to move around. Absolute no weight bearing discussed and taught- at length.  Recommended buying a shower chair to limit foot movement to a minimum- may take boot off for seated shower for 10 min 1-2 times/week..   Crutches provided, pt broke the first pair, was recommended to  another pair at the [facility]. Pt will borrow them from a " relative.    Ortho consult. Miguel recommend. OV next week with weight bearing X-rays to eval need for further intervention/surgery at that time.   He is willing to have RX 10 pain killers just in case of need, discussed NSAIDS- limited use with HTN, and tylenol dosing. Pt is worried about getting a blood clot after his injury. Warning signs discussed, advised right LE exercises throughout the day.      Depression- stable, improving. Pt reports doing well on his current dose of of Wellbutrin XL 150MG and Celexa 40MG. Refills provided.     Hypertension- controlled, will continue with the current plan. Tenormin 100MG and Hydrodiuril 25MG refilled, pt reports doing well on his current HTN medications.     Follow up with Provider in May for a PE/labs and med check.     The information in this document, created by the medical scribe for me, accurately reflects the services I personally performed and the decisions made by me. I have reviewed and approved this document for accuracy prior to leaving the patient care area.  Lissa Gamez CNP  8:31 AM, March 13, 2019    [facility]

## 2019-03-14 ASSESSMENT — ANXIETY QUESTIONNAIRES: GAD7 TOTAL SCORE: 0

## 2019-03-20 ENCOUNTER — ANCILLARY PROCEDURE (OUTPATIENT)
Dept: GENERAL RADIOLOGY | Facility: CLINIC | Age: 54
End: 2019-03-20
Attending: ORTHOPAEDIC SURGERY
Payer: COMMERCIAL

## 2019-03-20 ENCOUNTER — TELEPHONE (OUTPATIENT)
Dept: ORTHOPEDICS | Facility: OTHER | Age: 54
End: 2019-03-20

## 2019-03-20 ENCOUNTER — OFFICE VISIT (OUTPATIENT)
Dept: ORTHOPEDICS | Facility: CLINIC | Age: 54
End: 2019-03-20
Attending: NURSE PRACTITIONER
Payer: COMMERCIAL

## 2019-03-20 VITALS
HEIGHT: 72 IN | DIASTOLIC BLOOD PRESSURE: 77 MMHG | WEIGHT: 315 LBS | SYSTOLIC BLOOD PRESSURE: 121 MMHG | BODY MASS INDEX: 42.66 KG/M2

## 2019-03-20 DIAGNOSIS — S82.62XA CLOSED DISPLACED FRACTURE OF LATERAL MALLEOLUS OF LEFT FIBULA, INITIAL ENCOUNTER: Primary | ICD-10-CM

## 2019-03-20 DIAGNOSIS — S99.912A LEFT ANKLE INJURY: ICD-10-CM

## 2019-03-20 DIAGNOSIS — S82.392A CLOSED FRACTURE OF POSTERIOR MALLEOLUS OF LEFT TIBIA, INITIAL ENCOUNTER: ICD-10-CM

## 2019-03-20 PROCEDURE — 73610 X-RAY EXAM OF ANKLE: CPT | Mod: TC

## 2019-03-20 PROCEDURE — 99203 OFFICE O/P NEW LOW 30 MIN: CPT | Mod: 57 | Performed by: ORTHOPAEDIC SURGERY

## 2019-03-20 ASSESSMENT — PAIN SCALES - GENERAL: PAINLEVEL: NO PAIN (0)

## 2019-03-20 ASSESSMENT — MIFFLIN-ST. JEOR: SCORE: 2558.37

## 2019-03-20 NOTE — LETTER
3/20/2019         RE: Delbert Dowling  [address] [facility] [address]        Dear Colleague,    Thank you for referring your patient, Delbert Dowling, to the [facility]. Please see a copy of my visit note below.    ORTHOPEDIC CONSULT      Chief Complaint: Delbert Dowling is a 53 year old male who is being seen for Chief Complaint   Patient presents with     Musculoskeletal Problem     left ankle injury- DOI; 3/13/2019     Consult     reF: Lissa Gamez       History of Present Illness:   Delbert Dowling is a 53 year old male who is seen in consultation at the request of Lissa Gamez for evaluation of left ankle fracture.  Ground-level fall slip injury 2019.  He was evaluated by Lissa Gamez.  Placed in a fracture boot.  He has been nonweightbearing working on edema control.  He denies any other injuries.  He presents with his wife.  He is been elevating using his fracture boot and being nonweightbearing.  No pre-existing issues.      Patient's past medical, surgical, social and family histories reviewed.     Past Medical History:   Diagnosis Date     Depression      History of tobacco abuse     quit      HTN (hypertension)        Past Surgical History:   Procedure Laterality Date     NO HISTORY OF SURGERY         Medications:    Current Outpatient Medications on File Prior to Visit:  atenolol (TENORMIN) 100 MG tablet Take 1 tablet (100 mg) by mouth daily   buPROPion (WELLBUTRIN XL) 150 MG 24 hr tablet Take 1 tablet (150 mg) by mouth daily   citalopram (CELEXA) 40 MG tablet Take 1 tablet (40 mg) by mouth daily   hydrochlorothiazide (HYDRODIURIL) 25 MG tablet TAKE 1 TAB BY MOUTH ONCE DAILY IN THE MORNING   Multiple Vitamins-Minerals (MULTIVITAMIN ADULT PO)    [] HYDROcodone-acetaminophen (NORCO) 5-325 MG tablet Take 1-2 tablets by mouth every 6 hours as needed for pain     No current facility-administered medications on file prior to visit.     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    Physical Exam:  Vitals: /77   Ht 1.816 m (5' 11.5\")   Wt (!) 167.8 kg (370 lb)   BMI 50.89 kg/m     BMI= Body mass index is 50.89 kg/m .  Constitutional: healthy, alert and no acute distress   Psychiatric: mentation appears normal and affect normal/bright  NEURO: no focal deficits  RESP: Normal with easy respirations and no use of accessory muscles to breathe, no audible wheezing or retractions  CV: LLE:  midcalf and down-  non-pitting edema         Regular rate and rhythm by palpation  SKIN: No erythema, rashes, excoriation, or breakdown. No evidence of infection.   JOINT/EXTREMITIES:left ankle: Tenderness over lateral malleolus.  There is no foot or medial sided tenderness.  There is some ecchymosis along the dorsum of the ankle and foot.  Toes are warm and dry with good cap refill.  Small amount of excoriation along the anterior aspect of the ankle.  Toes are well perfused.     GAIT: not tested     Diagnostic Modalities:  left ankle X-ray: Shows a displaced lateral malleolus fracture oblique in nature.  There is some minimal shortening of the lateral malleolus.  Overall the mortise is well-maintained with no subluxation or dislocations.  Appears to be a small posterior malleolar fragment as well.  Independent visualization of the images was performed.      Impression: " left displaced lateral malleolus fracture with small posterior malleolus fracture    Plan:  All of the above pertinent physical exam and imaging modalities findings was reviewed with Delbert and his wife.    Treatment options discussed.  Given the displaced nature of the fracture I recommended operative fixation in the form of open reduction internal fixation with plate and screws.  We discussed edema control.  He should continue to elevate and ice before surgery.  Planned surgery this coming Friday, March 22.  Continue being nonweightbearing with the boot.  We discussed the initial edema and the importance of a staged procedure to allow for edema to resolve.    Risks such as infection, bleeding, hardware failure, need for hardware removal, damage to nerves/vessles, scar, scar sensitivity, wound problems, stiffness and loss of motion, was discussed. The patient verbalized an understanding of this conversation.  I have recommend this procedure so the patient will  be able to return back to pre-injury function.       Return to clinic 10 days post-operatively. , or sooner as needed for changes.  Re-x-ray on return: Yes.  Nonweightbearing 3 view ankle    Mariusz Rivera D.O.    Again, thank you for allowing me to participate in the care of your patient.        Sincerely,        Amrit Rivera, DO

## 2019-03-20 NOTE — H&P (VIEW-ONLY)
[facility]  [address] [facility] [address]  [phone]  Dept: [phone]    PRE-OP EVALUATION:  Today's date: 3/21/2019    Delbert Dowling (: 1965) presents for pre-operative evaluation assessment as requested by Dr. Rivera.  He requires evaluation and anesthesia risk assessment prior to undergoing surgery/procedure for treatment of left ankle fracture fixation.    Fax number for surgical facility:   Primary Physician: Lissa Gamez  Type of Anesthesia Anticipated: General    Patient has a Health Care Directive or Living Will:  NO    Preop Questions 3/21/2019   Who is doing your surgery? neil   What are you having done? tibia/Fibula plate and screws   Date of Surgery/Procedure: 3/22   Facility or Hospital where procedure/surgery will be performed: [facility]   1.  Do you have a history of Heart attack, stroke, stent, coronary bypass surgery, or other heart surgery? No   2.  Do you ever have any pain or discomfort in your chest? No   3.  Do you have a history of  Heart Failure? No   4.   Are you troubled by shortness of breath when:  walking on a level surface, or up a slight hill, or at night? No   5.  Do you currently have a cold, bronchitis or other respiratory infection? No   6.  Do you have a cough, shortness of breath, or wheezing? No   7.  Do you sometimes get pains in the calves of your legs when you walk? No   8. Do you or anyone in your family have previous history of blood clots? No   9.  Do you or does anyone in your family have a serious bleeding problem such as prolonged bleeding following surgeries or cuts? No   10. Have you ever had problems with anemia or been told to take iron pills? No   11. Have you had any abnormal blood loss such as black, tarry or bloody stools? No   12. Have you ever had a blood transfusion? No   13. Have you or any of your relatives ever had problems with anesthesia? No   14. Do you have sleep apnea, excessive snoring or daytime  drowsiness? No   15. Do you have any prosthetic heart valves? No   16. Do you have prosthetic joints? No         HPI:     HPI related to upcoming procedure: Slipped on wet ice walking out of his home on the way to work one morning.  Fractured the left ankle.      See problem list for active medical problems.  Problems all longstanding and stable, except as noted/documented.  See ROS for pertinent symptoms related to these conditions.                                                                                                                                                          .    MEDICAL HISTORY:     Patient Active Problem List    Diagnosis Date Noted     Closed fracture of posterior malleolus of left tibia, initial encounter 03/20/2019     Priority: Medium     Closed displaced fracture of lateral malleolus of left fibula, initial encounter 03/20/2019     Priority: Medium     Closed fracture of left tibia and fibula, initial encounter 03/13/2019     Priority: Medium     Morbid obesity (H) 05/02/2018     Priority: Medium     Snoring 05/02/2018     Priority: Medium     Episode of recurrent major depressive disorder, unspecified depression episode severity (H) 05/01/2018     Priority: Medium     Essential hypertension 05/01/2018     Priority: Medium      Past Medical History:   Diagnosis Date     Depression      History of tobacco abuse     quit 2006     HTN (hypertension)      Past Surgical History:   Procedure Laterality Date     NO HISTORY OF SURGERY       Current Outpatient Medications   Medication Sig Dispense Refill     atenolol (TENORMIN) 100 MG tablet Take 1 tablet (100 mg) by mouth daily 90 tablet 0     buPROPion (WELLBUTRIN XL) 150 MG 24 hr tablet Take 1 tablet (150 mg) by mouth daily 90 tablet 0     citalopram (CELEXA) 40 MG tablet Take 1 tablet (40 mg) by mouth daily 90 tablet 0     hydrochlorothiazide (HYDRODIURIL) 25 MG tablet TAKE 1 TAB BY MOUTH ONCE DAILY IN THE MORNING 90 tablet 0     Multiple  Vitamins-Minerals (MULTIVITAMIN ADULT PO)        OTC products: None, except as noted above    Allergies   Allergen Reactions     Lisinopril Cough      Latex Allergy: NO    Social History     Tobacco Use     Smoking status: Former Smoker     Packs/day: 1.00     Years: 20.00     Pack years: 20.00     Smokeless tobacco: Never Used     Tobacco comment: quit 13 years    Substance Use Topics     Alcohol use: Yes     Comment: none      History   Drug Use No       REVIEW OF SYSTEMS:   CONSTITUTIONAL: NEGATIVE for fever, chills, change in weight  INTEGUMENTARY/SKIN: NEGATIVE for worrisome rashes, moles or lesions  EYES: NEGATIVE for vision changes or irritation  ENT/MOUTH: NEGATIVE for ear, mouth and throat problems  RESP: NEGATIVE for significant cough or SOB  BREAST: NEGATIVE for masses, tenderness or discharge  CV: NEGATIVE for chest pain, palpitations or peripheral edema  GI: NEGATIVE for nausea, abdominal pain, heartburn, or change in bowel habits  : NEGATIVE for frequency, dysuria, or hematuria  MUSCULOSKELETAL: Mild left ankle pain noted.  He presents today in an air boot.  NEURO: NEGATIVE for weakness, dizziness or paresthesias  ENDOCRINE: NEGATIVE for temperature intolerance, skin/hair changes  HEME: NEGATIVE for bleeding problems  PSYCHIATRIC: NEGATIVE for changes in mood or affect    EXAM:   /70 (Cuff Size: Adult Large)   Temp 98  F (36.7  C) (Temporal)   Resp 18   SpO2 95%     GENERAL APPEARANCE: healthy, alert and no distress     EYES: EOMI,  PERRL     HENT: ear canals and TM's normal and nose and mouth without ulcers or lesions     NECK: no adenopathy, no asymmetry, masses, or scars and thyroid normal to palpation     RESP: lungs clear to auscultation - no rales, rhonchi or wheezes     CV: regular rates and rhythm, normal S1 S2, no S3 or S4 and no murmur, click or rub     ABDOMEN:  soft, nontender, no HSM or masses and bowel sounds normal     MS: Left ankle is encompassed in an air cast.  No other  pain or discomfort other than that today is reported.     SKIN: no suspicious lesions or rashes     NEURO: Normal strength and tone, sensory exam grossly normal, mentation intact and speech normal     PSYCH: mentation appears normal. and affect normal/bright     LYMPHATICS: No cervical adenopathy    DIAGNOSTICS:     No labs or EKG required for low risk surgery (cataract, skin procedure, breast biopsy, etc)  Labs Drawn and in Process:   Unresulted Labs Ordered in the Past 30 Days of this Admission     Date and Time Order Name Status Description    3/21/2019 1117 LDL CHOLESTEROL DIRECT In process     3/21/2019 1117 HEMOGLOBIN A1C In process     3/21/2019 1117 COMPREHENSIVE METABOLIC PANEL In process     3/21/2019 1117 CBC WITH PLATELETS DIFFERENTIAL In process           Recent Labs   Lab Test 05/01/18  0832 03/22/16     --    POTASSIUM 3.9 4.1   CR 0.83 0.82        IMPRESSION:   Reason for surgery/procedure: Patient needs fixation of the left ankle due to a significant fracture.  Diagnosis/reason for consult: Anesthesia/surgical clearance.    The proposed surgical procedure is considered INTERMEDIATE risk.    REVISED CARDIAC RISK INDEX  The patient has the following serious cardiovascular risks for perioperative complications such as (MI, PE, VFib and 3  AV Block):  No serious cardiac risks  INTERPRETATION: 1 risks: Class II (low risk - 0.9% complication rate)    The patient has the following additional risks for perioperative complications:  No identified additional risks  The 10-year ASCVD risk score (Andreina MAY Jr., et al., 2013) is: 2.9%    Values used to calculate the score:      Age: 53 years      Sex: Male      Is Non- : No      Diabetic: No      Tobacco smoker: No      Systolic Blood Pressure: 122 mmHg      Is BP treated: Yes      HDL Cholesterol: 47 mg/dL      Total Cholesterol: 135 mg/dL      ICD-10-CM    1. Preop general physical exam Z01.818        RECOMMENDATIONS:        Obstructive Sleep Apnea (or suspected sleep apnea)  Hospital staff are advised to monitor for sleep related oxygen desaturations due to suspicion of CLAIR      --Patient is to take all scheduled medications on the day of surgery EXCEPT for modifications listed below.    APPROVAL GIVEN to proceed with proposed procedure, without further diagnostic evaluation       Signed Electronically by: FRED Ramesh PA-C    Copy of this evaluation report is provided to requesting physician.    [facility] Preop Guidelines    Revised Cardiac Risk Index  Answers for HPI/ROS submitted by the patient on 3/21/2019   PHQ9 TOTAL SCORE: 0  MYKE 7 TOTAL SCORE: 0

## 2019-03-20 NOTE — TELEPHONE ENCOUNTER
Type of surgery: Left ankle ORIF  Location of surgery: [facility]   Date of surgery: 3/22/19  Surgeon: Dr. Rivera  Pre-Op Appt Date: 3/21/19  Post-Op Appt Date: 4/3/19   Packet sent out: Surgery packet was given to patient in clinic.   Pre-cert/Authorization completed: NA  Date: 3/20/2019    Una Sorensen  Surgery Scheduler

## 2019-03-20 NOTE — PROGRESS NOTES
[facility]  [address] [facility] [address] [facility] [address]  [phone]  Dept: [phone]    PRE-OP EVALUATION:  Today's date: 3/21/2019    Delbert Dowling (: 1965) presents for pre-operative evaluation assessment as requested by Dr. Rivera.  He requires evaluation and anesthesia risk assessment prior to undergoing surgery/procedure for treatment of left ankle fracture fixation.    Fax number for surgical facility:   Primary Physician: Lissa Gamez  Type of Anesthesia Anticipated: General    Patient has a Health Care Directive or Living Will:  NO    Preop Questions 3/21/2019   Who is doing your surgery? neil   What are you having done? tibia/Fibula plate and screws   Date of Surgery/Procedure: 3/22   Facility or Hospital where procedure/surgery will be performed: [facility]   1.  Do you have a history of Heart attack, stroke, stent, coronary bypass surgery, or other heart surgery? No   2.  Do you ever have any pain or discomfort in your chest? No   3.  Do you have a history of  Heart Failure? No   4.   Are you troubled by shortness of breath when:  walking on a level surface, or up a slight hill, or at night? No   5.  Do you currently have a cold, bronchitis or other respiratory infection? No   6.  Do you have a cough, shortness of breath, or wheezing? No   7.  Do you sometimes get pains in the calves of your legs when you walk? No   8. Do you or anyone in your family have previous history of blood clots? No   9.  Do you or does anyone in your family have a serious bleeding problem such as prolonged bleeding following surgeries or cuts? No   10. Have you ever had problems with anemia or been told to take iron pills? No   11. Have you had any abnormal blood loss such as black, tarry or bloody stools? No   12. Have you ever had a blood transfusion? No   13. Have you or any of your relatives ever had problems with anesthesia? No   14. Do you have sleep apnea, excessive snoring or daytime  drowsiness? No   15. Do you have any prosthetic heart valves? No   16. Do you have prosthetic joints? No         HPI:     HPI related to upcoming procedure: Slipped on wet ice walking out of his home on the way to work one morning.  Fractured the left ankle.      See problem list for active medical problems.  Problems all longstanding and stable, except as noted/documented.  See ROS for pertinent symptoms related to these conditions.                                                                                                                                                          .    MEDICAL HISTORY:     Patient Active Problem List    Diagnosis Date Noted     Closed fracture of posterior malleolus of left tibia, initial encounter 03/20/2019     Priority: Medium     Closed displaced fracture of lateral malleolus of left fibula, initial encounter 03/20/2019     Priority: Medium     Closed fracture of left tibia and fibula, initial encounter 03/13/2019     Priority: Medium     Morbid obesity (H) 05/02/2018     Priority: Medium     Snoring 05/02/2018     Priority: Medium     Episode of recurrent major depressive disorder, unspecified depression episode severity (H) 05/01/2018     Priority: Medium     Essential hypertension 05/01/2018     Priority: Medium      Past Medical History:   Diagnosis Date     Depression      History of tobacco abuse     quit 2006     HTN (hypertension)      Past Surgical History:   Procedure Laterality Date     NO HISTORY OF SURGERY       Current Outpatient Medications   Medication Sig Dispense Refill     atenolol (TENORMIN) 100 MG tablet Take 1 tablet (100 mg) by mouth daily 90 tablet 0     buPROPion (WELLBUTRIN XL) 150 MG 24 hr tablet Take 1 tablet (150 mg) by mouth daily 90 tablet 0     citalopram (CELEXA) 40 MG tablet Take 1 tablet (40 mg) by mouth daily 90 tablet 0     hydrochlorothiazide (HYDRODIURIL) 25 MG tablet TAKE 1 TAB BY MOUTH ONCE DAILY IN THE MORNING 90 tablet 0     Multiple  Vitamins-Minerals (MULTIVITAMIN ADULT PO)        OTC products: None, except as noted above    Allergies   Allergen Reactions     Lisinopril Cough      Latex Allergy: NO    Social History     Tobacco Use     Smoking status: Former Smoker     Packs/day: 1.00     Years: 20.00     Pack years: 20.00     Smokeless tobacco: Never Used     Tobacco comment: quit 13 years    Substance Use Topics     Alcohol use: Yes     Comment: none      History   Drug Use No       REVIEW OF SYSTEMS:   CONSTITUTIONAL: NEGATIVE for fever, chills, change in weight  INTEGUMENTARY/SKIN: NEGATIVE for worrisome rashes, moles or lesions  EYES: NEGATIVE for vision changes or irritation  ENT/MOUTH: NEGATIVE for ear, mouth and throat problems  RESP: NEGATIVE for significant cough or SOB  BREAST: NEGATIVE for masses, tenderness or discharge  CV: NEGATIVE for chest pain, palpitations or peripheral edema  GI: NEGATIVE for nausea, abdominal pain, heartburn, or change in bowel habits  : NEGATIVE for frequency, dysuria, or hematuria  MUSCULOSKELETAL: Mild left ankle pain noted.  He presents today in an air boot.  NEURO: NEGATIVE for weakness, dizziness or paresthesias  ENDOCRINE: NEGATIVE for temperature intolerance, skin/hair changes  HEME: NEGATIVE for bleeding problems  PSYCHIATRIC: NEGATIVE for changes in mood or affect    EXAM:   /70 (Cuff Size: Adult Large)   Temp 98  F (36.7  C) (Temporal)   Resp 18   SpO2 95%     GENERAL APPEARANCE: healthy, alert and no distress     EYES: EOMI,  PERRL     HENT: ear canals and TM's normal and nose and mouth without ulcers or lesions     NECK: no adenopathy, no asymmetry, masses, or scars and thyroid normal to palpation     RESP: lungs clear to auscultation - no rales, rhonchi or wheezes     CV: regular rates and rhythm, normal S1 S2, no S3 or S4 and no murmur, click or rub     ABDOMEN:  soft, nontender, no HSM or masses and bowel sounds normal     MS: Left ankle is encompassed in an air cast.  No other  pain or discomfort other than that today is reported.     SKIN: no suspicious lesions or rashes     NEURO: Normal strength and tone, sensory exam grossly normal, mentation intact and speech normal     PSYCH: mentation appears normal. and affect normal/bright     LYMPHATICS: No cervical adenopathy    DIAGNOSTICS:     No labs or EKG required for low risk surgery (cataract, skin procedure, breast biopsy, etc)  Labs Drawn and in Process:   Unresulted Labs Ordered in the Past 30 Days of this Admission     Date and Time Order Name Status Description    3/21/2019 1117 LDL CHOLESTEROL DIRECT In process     3/21/2019 1117 HEMOGLOBIN A1C In process     3/21/2019 1117 COMPREHENSIVE METABOLIC PANEL In process     3/21/2019 1117 CBC WITH PLATELETS DIFFERENTIAL In process           Recent Labs   Lab Test 05/01/18  0832 03/22/16     --    POTASSIUM 3.9 4.1   CR 0.83 0.82        IMPRESSION:   Reason for surgery/procedure: Patient needs fixation of the left ankle due to a significant fracture.  Diagnosis/reason for consult: Anesthesia/surgical clearance.    The proposed surgical procedure is considered INTERMEDIATE risk.    REVISED CARDIAC RISK INDEX  The patient has the following serious cardiovascular risks for perioperative complications such as (MI, PE, VFib and 3  AV Block):  No serious cardiac risks  INTERPRETATION: 1 risks: Class II (low risk - 0.9% complication rate)    The patient has the following additional risks for perioperative complications:  No identified additional risks  The 10-year ASCVD risk score (Andreina MAY Jr., et al., 2013) is: 2.9%    Values used to calculate the score:      Age: 53 years      Sex: Male      Is Non- : No      Diabetic: No      Tobacco smoker: No      Systolic Blood Pressure: 122 mmHg      Is BP treated: Yes      HDL Cholesterol: 47 mg/dL      Total Cholesterol: 135 mg/dL      ICD-10-CM    1. Preop general physical exam Z01.818        RECOMMENDATIONS:        Obstructive Sleep Apnea (or suspected sleep apnea)  Hospital staff are advised to monitor for sleep related oxygen desaturations due to suspicion of CLAIR      --Patient is to take all scheduled medications on the day of surgery EXCEPT for modifications listed below.    APPROVAL GIVEN to proceed with proposed procedure, without further diagnostic evaluation       Signed Electronically by: FRED Ramesh PA-C    Copy of this evaluation report is provided to requesting physician.    [facility] Preop Guidelines    Revised Cardiac Risk Index  Answers for HPI/ROS submitted by the patient on 3/21/2019   PHQ9 TOTAL SCORE: 0  MYKE 7 TOTAL SCORE: 0

## 2019-03-20 NOTE — PROGRESS NOTES
ORTHOPEDIC CONSULT      Chief Complaint: Delbert Dowling is a 53 year old male who is being seen for Chief Complaint   Patient presents with     Musculoskeletal Problem     left ankle injury- DOI; 3/13/2019     Consult     reF: Lissa Gamez       History of Present Illness:   Delbert Dowling is a 53 year old male who is seen in consultation at the request of Lissa Gamez for evaluation of left ankle fracture.  Ground-level fall slip injury 2019.  He was evaluated by Lissa Gamez.  Placed in a fracture boot.  He has been nonweightbearing working on edema control.  He denies any other injuries.  He presents with his wife.  He is been elevating using his fracture boot and being nonweightbearing.  No pre-existing issues.      Patient's past medical, surgical, social and family histories reviewed.     Past Medical History:   Diagnosis Date     Depression      History of tobacco abuse     quit      HTN (hypertension)        Past Surgical History:   Procedure Laterality Date     NO HISTORY OF SURGERY         Medications:    Current Outpatient Medications on File Prior to Visit:  atenolol (TENORMIN) 100 MG tablet Take 1 tablet (100 mg) by mouth daily   buPROPion (WELLBUTRIN XL) 150 MG 24 hr tablet Take 1 tablet (150 mg) by mouth daily   citalopram (CELEXA) 40 MG tablet Take 1 tablet (40 mg) by mouth daily   hydrochlorothiazide (HYDRODIURIL) 25 MG tablet TAKE 1 TAB BY MOUTH ONCE DAILY IN THE MORNING   Multiple Vitamins-Minerals (MULTIVITAMIN ADULT PO)    [] HYDROcodone-acetaminophen (NORCO) 5-325 MG tablet Take 1-2 tablets by mouth every 6 hours as needed for pain     No current facility-administered medications on file prior to visit.     Allergies   Allergen Reactions     Lisinopril Cough       Social History     Occupational History     Comment: [employer]   Tobacco Use     Smoking status: Former Smoker     Packs/day: 1.00     Years: 20.00     Pack years: 20.00     Smokeless tobacco: Never Used     Tobacco  "comment: quit 13 years    Substance and Sexual Activity     Alcohol use: Yes     Comment: none      Drug use: No     Sexual activity: Yes     Partners: Female       Family History   Problem Relation Age of Onset     Chronic Obstructive Pulmonary Disease Mother      Hypertension Father        REVIEW OF SYSTEMS  10 point review systems performed otherwise negative as noted as per history of present illness.    Physical Exam:  Vitals: /77   Ht 1.816 m (5' 11.5\")   Wt (!) 167.8 kg (370 lb)   BMI 50.89 kg/m    BMI= Body mass index is 50.89 kg/m .  Constitutional: healthy, alert and no acute distress   Psychiatric: mentation appears normal and affect normal/bright  NEURO: no focal deficits  RESP: Normal with easy respirations and no use of accessory muscles to breathe, no audible wheezing or retractions  CV: LLE:  midcalf and down-  non-pitting edema         Regular rate and rhythm by palpation  SKIN: No erythema, rashes, excoriation, or breakdown. No evidence of infection.   JOINT/EXTREMITIES:left ankle: Tenderness over lateral malleolus.  There is no foot or medial sided tenderness.  There is some ecchymosis along the dorsum of the ankle and foot.  Toes are warm and dry with good cap refill.  Small amount of excoriation along the anterior aspect of the ankle.  Toes are well perfused.     GAIT: not tested     Diagnostic Modalities:  left ankle X-ray: Shows a displaced lateral malleolus fracture oblique in nature.  There is some minimal shortening of the lateral malleolus.  Overall the mortise is well-maintained with no subluxation or dislocations.  Appears to be a small posterior malleolar fragment as well.  Independent visualization of the images was performed.      Impression: left displaced lateral malleolus fracture with small posterior malleolus fracture    Plan:  All of the above pertinent physical exam and imaging modalities findings was reviewed with Delbert and his wife.    Treatment options discussed.  " Given the displaced nature of the fracture I recommended operative fixation in the form of open reduction internal fixation with plate and screws.  We discussed edema control.  He should continue to elevate and ice before surgery.  Planned surgery this coming Friday, March 22.  Continue being nonweightbearing with the boot.  We discussed the initial edema and the importance of a staged procedure to allow for edema to resolve.    Risks such as infection, bleeding, hardware failure, need for hardware removal, damage to nerves/vessles, scar, scar sensitivity, wound problems, stiffness and loss of motion, was discussed. The patient verbalized an understanding of this conversation.  I have recommend this procedure so the patient will  be able to return back to pre-injury function.       Return to clinic 10 days post-operatively. , or sooner as needed for changes.  Re-x-ray on return: Yes.  Nonweightbearing 3 view ankle    Mariusz Rivera D.O.

## 2019-03-21 ENCOUNTER — OFFICE VISIT (OUTPATIENT)
Dept: FAMILY MEDICINE | Facility: OTHER | Age: 54
End: 2019-03-21
Payer: COMMERCIAL

## 2019-03-21 VITALS
SYSTOLIC BLOOD PRESSURE: 122 MMHG | OXYGEN SATURATION: 95 % | RESPIRATION RATE: 18 BRPM | TEMPERATURE: 98 F | DIASTOLIC BLOOD PRESSURE: 70 MMHG

## 2019-03-21 DIAGNOSIS — S82.402A CLOSED FRACTURE OF LEFT TIBIA AND FIBULA, INITIAL ENCOUNTER: ICD-10-CM

## 2019-03-21 DIAGNOSIS — S82.202A CLOSED FRACTURE OF LEFT TIBIA AND FIBULA, INITIAL ENCOUNTER: ICD-10-CM

## 2019-03-21 DIAGNOSIS — S82.62XA CLOSED DISPLACED FRACTURE OF LATERAL MALLEOLUS OF LEFT FIBULA, INITIAL ENCOUNTER: ICD-10-CM

## 2019-03-21 DIAGNOSIS — S82.392A CLOSED FRACTURE OF POSTERIOR MALLEOLUS OF LEFT TIBIA, INITIAL ENCOUNTER: ICD-10-CM

## 2019-03-21 DIAGNOSIS — E66.01 MORBID OBESITY (H): ICD-10-CM

## 2019-03-21 DIAGNOSIS — R06.83 SNORING: ICD-10-CM

## 2019-03-21 DIAGNOSIS — Z01.818 PREOP GENERAL PHYSICAL EXAM: Primary | ICD-10-CM

## 2019-03-21 LAB
ALBUMIN SERPL-MCNC: 3.9 G/DL (ref 3.4–5)
ALP SERPL-CCNC: 58 U/L (ref 40–150)
ALT SERPL W P-5'-P-CCNC: 30 U/L (ref 0–70)
ANION GAP SERPL CALCULATED.3IONS-SCNC: 8 MMOL/L (ref 3–14)
AST SERPL W P-5'-P-CCNC: 19 U/L (ref 0–45)
BASOPHILS # BLD AUTO: 0.1 10E9/L (ref 0–0.2)
BASOPHILS NFR BLD AUTO: 0.6 %
BILIRUB SERPL-MCNC: 0.7 MG/DL (ref 0.2–1.3)
BUN SERPL-MCNC: 15 MG/DL (ref 7–30)
CALCIUM SERPL-MCNC: 8.9 MG/DL (ref 8.5–10.1)
CHLORIDE SERPL-SCNC: 103 MMOL/L (ref 94–109)
CO2 SERPL-SCNC: 28 MMOL/L (ref 20–32)
CREAT SERPL-MCNC: 0.92 MG/DL (ref 0.66–1.25)
DIFFERENTIAL METHOD BLD: NORMAL
EOSINOPHIL # BLD AUTO: 0.1 10E9/L (ref 0–0.7)
EOSINOPHIL NFR BLD AUTO: 1.3 %
ERYTHROCYTE [DISTWIDTH] IN BLOOD BY AUTOMATED COUNT: 12.9 % (ref 10–15)
GFR SERPL CREATININE-BSD FRML MDRD: >90 ML/MIN/{1.73_M2}
GLUCOSE SERPL-MCNC: 103 MG/DL (ref 70–99)
HBA1C MFR BLD: 5.5 % (ref 0–5.6)
HCT VFR BLD AUTO: 42.1 % (ref 40–53)
HGB BLD-MCNC: 14.5 G/DL (ref 13.3–17.7)
LDLC SERPL DIRECT ASSAY-MCNC: 72 MG/DL
LYMPHOCYTES # BLD AUTO: 1.7 10E9/L (ref 0.8–5.3)
LYMPHOCYTES NFR BLD AUTO: 20.9 %
MCH RBC QN AUTO: 32.4 PG (ref 26.5–33)
MCHC RBC AUTO-ENTMCNC: 34.4 G/DL (ref 31.5–36.5)
MCV RBC AUTO: 94 FL (ref 78–100)
MONOCYTES # BLD AUTO: 0.6 10E9/L (ref 0–1.3)
MONOCYTES NFR BLD AUTO: 8.1 %
NEUTROPHILS # BLD AUTO: 5.5 10E9/L (ref 1.6–8.3)
NEUTROPHILS NFR BLD AUTO: 69.1 %
PLATELET # BLD AUTO: 195 10E9/L (ref 150–450)
POTASSIUM SERPL-SCNC: 4.2 MMOL/L (ref 3.4–5.3)
PROT SERPL-MCNC: 7.3 G/DL (ref 6.8–8.8)
RBC # BLD AUTO: 4.47 10E12/L (ref 4.4–5.9)
SODIUM SERPL-SCNC: 139 MMOL/L (ref 133–144)
WBC # BLD AUTO: 7.9 10E9/L (ref 4–11)

## 2019-03-21 PROCEDURE — 80053 COMPREHEN METABOLIC PANEL: CPT | Performed by: PHYSICIAN ASSISTANT

## 2019-03-21 PROCEDURE — 93000 ELECTROCARDIOGRAM COMPLETE: CPT | Performed by: PHYSICIAN ASSISTANT

## 2019-03-21 PROCEDURE — 36415 COLL VENOUS BLD VENIPUNCTURE: CPT | Performed by: PHYSICIAN ASSISTANT

## 2019-03-21 PROCEDURE — 83036 HEMOGLOBIN GLYCOSYLATED A1C: CPT | Performed by: PHYSICIAN ASSISTANT

## 2019-03-21 PROCEDURE — 85025 COMPLETE CBC W/AUTO DIFF WBC: CPT | Performed by: PHYSICIAN ASSISTANT

## 2019-03-21 PROCEDURE — 99215 OFFICE O/P EST HI 40 MIN: CPT | Performed by: PHYSICIAN ASSISTANT

## 2019-03-21 PROCEDURE — 83721 ASSAY OF BLOOD LIPOPROTEIN: CPT | Performed by: PHYSICIAN ASSISTANT

## 2019-03-21 ASSESSMENT — PATIENT HEALTH QUESTIONNAIRE - PHQ9
SUM OF ALL RESPONSES TO PHQ QUESTIONS 1-9: 0
SUM OF ALL RESPONSES TO PHQ QUESTIONS 1-9: 0

## 2019-03-21 ASSESSMENT — ANXIETY QUESTIONNAIRES
GAD7 TOTAL SCORE: 0
4. TROUBLE RELAXING: NOT AT ALL
GAD7 TOTAL SCORE: 0
6. BECOMING EASILY ANNOYED OR IRRITABLE: NOT AT ALL
5. BEING SO RESTLESS THAT IT IS HARD TO SIT STILL: NOT AT ALL
7. FEELING AFRAID AS IF SOMETHING AWFUL MIGHT HAPPEN: NOT AT ALL
3. WORRYING TOO MUCH ABOUT DIFFERENT THINGS: NOT AT ALL
2. NOT BEING ABLE TO STOP OR CONTROL WORRYING: NOT AT ALL
GAD7 TOTAL SCORE: 0
7. FEELING AFRAID AS IF SOMETHING AWFUL MIGHT HAPPEN: NOT AT ALL
1. FEELING NERVOUS, ANXIOUS, OR ON EDGE: NOT AT ALL

## 2019-03-21 ASSESSMENT — PAIN SCALES - GENERAL: PAINLEVEL: NO PAIN (0)

## 2019-03-21 NOTE — LETTER
My Depression Action Plan  Name: Delbert Dowling   Date of Birth 1965  Date: 3/20/2019    My doctor: Lissa Gamez   My clinic: [facility]  [address] [facility] [address] [address] [phone]          GREEN    ZONE   Good Control    What it looks like:     Things are going generally well. You have normal up s and down s. You may even feel depressed from time to time, but bad moods usually last less than a day.   What you need to do:  1. Continue to care for yourself (see self care plan)  2. Check your depression survival kit and update it as needed  3. Follow your physician s recommendations including any medication.  4. Do not stop taking medication unless you consult with your physician first.           YELLOW         ZONE Getting Worse    What it looks like:     Depression is starting to interfere with your life.     It may be hard to get out of bed; you may be starting to isolate yourself from others.    Symptoms of depression are starting to last most all day and this has happened for several days.     You may have suicidal thoughts but they are not constant.   What you need to do:     1. Call your care team, your response to treatment will improve if you keep your care team informed of your progress. Yellow periods are signs an adjustment may need to be made.     2. Continue your self-care, even if you have to fake it!    3. Talk to someone in your support network    4. Open up your depression survival kit           RED    ZONE Medical Alert - Get Help    What it looks like:     Depression is seriously interfering with your life.     You may experience these or other symptoms: You can t get out of bed most days, can t work or engage in other necessary activities, you have trouble taking care of basic hygiene, or basic responsibilities, thoughts of suicide or death that will not go away, self-injurious behavior.     What you need to do:  1. Call your care team and request a  same-day appointment. If they are not available (weekends or after hours) call your local crisis line, emergency room or 911.            Depression Self Care Plan / Survival Kit    Self-Care for Depression  Here s the deal. Your body and mind are really not as separate as most people think.  What you do and think affects how you feel and how you feel influences what you do and think. This means if you do things that people who feel good do, it will help you feel better.  Sometimes this is all it takes.  There is also a place for medication and therapy depending on how severe your depression is, so be sure to consult with your medical provider and/ or Behavioral Health Consultant if your symptoms are worsening or not improving.     In order to better manage my stress, I will:    Exercise  Get some form of exercise, every day. This will help reduce pain and release endorphins, the  feel good  chemicals in your brain. This is almost as good as taking antidepressants!  This is not the same as joining a gym and then never going! (they count on that by the way ) It can be as simple as just going for a walk or doing some gardening, anything that will get you moving.      Hygiene   Maintain good hygiene (Get out of bed in the morning, Make your bed, Brush your teeth, Take a shower, and Get dressed like you were going to work, even if you are unemployed).  If your clothes don't fit try to get ones that do.    Diet  I will strive to eat foods that are good for me, drink plenty of water, and avoid excessive sugar, caffeine, alcohol, and other mood-altering substances.  Some foods that are helpful in depression are: complex carbohydrates, B vitamins, flaxseed, fish or fish oil, fresh fruits and vegetables.    Psychotherapy  I agree to participate in Individual Therapy (if recommended).    Medication  If prescribed medications, I agree to take them.  Missing doses can result in serious side effects.  I understand that drinking  alcohol, or other illicit drug use, may cause potential side effects.  I will not stop my medication abruptly without first discussing it with my provider.    Staying Connected With Others  I will stay in touch with my friends, family members, and my primary care provider/team.    Use your imagination  Be creative.  We all have a creative side; it doesn t matter if it s oil painting, sand castles, or mud pies! This will also kick up the endorphins.    Witness Beauty  (AKA stop and smell the roses) Take a look outside, even in mid-winter. Notice colors, textures. Watch the squirrels and birds.     Service to others  Be of service to others.  There is always someone else in need.  By helping others we can  get out of ourselves  and remember the really important things.  This also provides opportunities for practicing all the other parts of the program.    Humor  Laugh and be silly!  Adjust your TV habits for less news and crime-drama and more comedy.    Control your stress  Try breathing deep, massage therapy, biofeedback, and meditation. Find time to relax each day.     My support system    Clinic Contact:  Phone number:    Contact 1:  Phone number:    Contact 2:  Phone number:    [facility]/:  Phone number:    Therapist:  Phone number:    Local crisis center:    Phone number:    Other community support:  Phone number:

## 2019-03-22 ENCOUNTER — HOSPITAL ENCOUNTER (OUTPATIENT)
Facility: CLINIC | Age: 54
Discharge: HOME | End: 2019-03-22
Attending: ORTHOPAEDIC SURGERY | Admitting: ORTHOPAEDIC SURGERY
Payer: COMMERCIAL

## 2019-03-22 ENCOUNTER — ANESTHESIA EVENT (OUTPATIENT)
Dept: SURGERY | Facility: CLINIC | Age: 54
End: 2019-03-22
Payer: COMMERCIAL

## 2019-03-22 ENCOUNTER — HOSPITAL ENCOUNTER (OUTPATIENT)
Dept: GENERAL RADIOLOGY | Facility: CLINIC | Age: 54
End: 2019-03-22
Attending: ORTHOPAEDIC SURGERY | Admitting: ORTHOPAEDIC SURGERY
Payer: COMMERCIAL

## 2019-03-22 ENCOUNTER — ANESTHESIA (OUTPATIENT)
Dept: SURGERY | Facility: CLINIC | Age: 54
End: 2019-03-22
Payer: COMMERCIAL

## 2019-03-22 VITALS
RESPIRATION RATE: 14 BRPM | TEMPERATURE: 99 F | DIASTOLIC BLOOD PRESSURE: 84 MMHG | OXYGEN SATURATION: 94 % | HEART RATE: 90 BPM | SYSTOLIC BLOOD PRESSURE: 137 MMHG

## 2019-03-22 DIAGNOSIS — S82.899A FX ANKLE: ICD-10-CM

## 2019-03-22 DIAGNOSIS — S82.62XA CLOSED DISPLACED FRACTURE OF LATERAL MALLEOLUS OF LEFT FIBULA, INITIAL ENCOUNTER: ICD-10-CM

## 2019-03-22 DIAGNOSIS — S82.392A CLOSED FRACTURE OF POSTERIOR MALLEOLUS OF LEFT TIBIA, INITIAL ENCOUNTER: Primary | ICD-10-CM

## 2019-03-22 PROCEDURE — 25000132 ZZH RX MED GY IP 250 OP 250 PS 637: Performed by: ORTHOPAEDIC SURGERY

## 2019-03-22 PROCEDURE — 37000009 ZZH ANESTHESIA TECHNICAL FEE, EACH ADDTL 15 MIN: Performed by: ORTHOPAEDIC SURGERY

## 2019-03-22 PROCEDURE — 25000125 ZZHC RX 250: Performed by: NURSE ANESTHETIST, CERTIFIED REGISTERED

## 2019-03-22 PROCEDURE — 36000063 ZZH SURGERY LEVEL 4 EA 15 ADDTL MIN: Performed by: ORTHOPAEDIC SURGERY

## 2019-03-22 PROCEDURE — 71000027 ZZH RECOVERY PHASE 2 EACH 15 MINS: Performed by: ORTHOPAEDIC SURGERY

## 2019-03-22 PROCEDURE — 71000014 ZZH RECOVERY PHASE 1 LEVEL 2 FIRST HR: Performed by: ORTHOPAEDIC SURGERY

## 2019-03-22 PROCEDURE — 27814 TREATMENT OF ANKLE FRACTURE: CPT | Mod: AS | Performed by: NURSE PRACTITIONER

## 2019-03-22 PROCEDURE — 27814 TREATMENT OF ANKLE FRACTURE: CPT | Mod: LT | Performed by: ORTHOPAEDIC SURGERY

## 2019-03-22 PROCEDURE — 37000008 ZZH ANESTHESIA TECHNICAL FEE, 1ST 30 MIN: Performed by: ORTHOPAEDIC SURGERY

## 2019-03-22 PROCEDURE — 25000566 ZZH SEVOFLURANE, EA 15 MIN: Performed by: ORTHOPAEDIC SURGERY

## 2019-03-22 PROCEDURE — 27211024 ZZHC OR SUPPLY OTHER OPNP: Performed by: ORTHOPAEDIC SURGERY

## 2019-03-22 PROCEDURE — 25000125 ZZHC RX 250: Performed by: ORTHOPAEDIC SURGERY

## 2019-03-22 PROCEDURE — 40000306 ZZH STATISTIC PRE PROC ASSESS II: Performed by: ORTHOPAEDIC SURGERY

## 2019-03-22 PROCEDURE — 25000128 H RX IP 250 OP 636: Performed by: NURSE ANESTHETIST, CERTIFIED REGISTERED

## 2019-03-22 PROCEDURE — 36000065 ZZH SURGERY LEVEL 4 W FLUORO 1ST 30 MIN: Performed by: ORTHOPAEDIC SURGERY

## 2019-03-22 PROCEDURE — 27829 TREAT LOWER LEG JOINT: CPT | Mod: AS | Performed by: NURSE PRACTITIONER

## 2019-03-22 PROCEDURE — 25000128 H RX IP 250 OP 636: Performed by: ORTHOPAEDIC SURGERY

## 2019-03-22 PROCEDURE — 25800030 ZZH RX IP 258 OP 636: Performed by: ORTHOPAEDIC SURGERY

## 2019-03-22 PROCEDURE — 25800030 ZZH RX IP 258 OP 636: Performed by: NURSE ANESTHETIST, CERTIFIED REGISTERED

## 2019-03-22 PROCEDURE — 40000277 XR SURGERY CARM FLUORO LESS THAN 5 MIN W STILLS

## 2019-03-22 PROCEDURE — C1713 ANCHOR/SCREW BN/BN,TIS/BN: HCPCS | Performed by: ORTHOPAEDIC SURGERY

## 2019-03-22 PROCEDURE — 27210794 ZZH OR GENERAL SUPPLY STERILE: Performed by: ORTHOPAEDIC SURGERY

## 2019-03-22 PROCEDURE — 27829 TREAT LOWER LEG JOINT: CPT | Mod: LT | Performed by: ORTHOPAEDIC SURGERY

## 2019-03-22 DEVICE — IMPLANTABLE DEVICE: Type: IMPLANTABLE DEVICE | Site: FIBULA | Status: FUNCTIONAL

## 2019-03-22 DEVICE — IMP KIT SYNDEMOSIS ARTHREX TIGHTROPE KNOTLESS SS AR-8926SS: Type: IMPLANTABLE DEVICE | Site: ANKLE | Status: FUNCTIONAL

## 2019-03-22 DEVICE — IMP SCR ARTHREX LP CANC 4.0X18MM SS AR-8840-18: Type: IMPLANTABLE DEVICE | Site: FIBULA | Status: FUNCTIONAL

## 2019-03-22 DEVICE — IMP PLATE ARTHREX LOCKING 1/3 TUBULAR 10H SS AR-8943T-10: Type: IMPLANTABLE DEVICE | Site: FIBULA | Status: FUNCTIONAL

## 2019-03-22 RX ORDER — AMOXICILLIN 250 MG
1-2 CAPSULE ORAL 2 TIMES DAILY
Qty: 30 TABLET | Refills: 1 | Status: SHIPPED | OUTPATIENT
Start: 2019-03-22 | End: 2019-09-11

## 2019-03-22 RX ORDER — ONDANSETRON 4 MG/1
4 TABLET, ORALLY DISINTEGRATING ORAL EVERY 30 MIN PRN
Status: DISCONTINUED | OUTPATIENT
Start: 2019-03-22 | End: 2019-03-22 | Stop reason: HOSPADM

## 2019-03-22 RX ORDER — KETOROLAC TROMETHAMINE 30 MG/ML
INJECTION, SOLUTION INTRAMUSCULAR; INTRAVENOUS PRN
Status: DISCONTINUED | OUTPATIENT
Start: 2019-03-22 | End: 2019-03-22

## 2019-03-22 RX ORDER — DIMENHYDRINATE 50 MG/ML
25 INJECTION, SOLUTION INTRAMUSCULAR; INTRAVENOUS
Status: DISCONTINUED | OUTPATIENT
Start: 2019-03-22 | End: 2019-03-22 | Stop reason: HOSPADM

## 2019-03-22 RX ORDER — FENTANYL CITRATE 50 UG/ML
INJECTION, SOLUTION INTRAMUSCULAR; INTRAVENOUS PRN
Status: DISCONTINUED | OUTPATIENT
Start: 2019-03-22 | End: 2019-03-22

## 2019-03-22 RX ORDER — DEXAMETHASONE SODIUM PHOSPHATE 10 MG/ML
INJECTION, SOLUTION INTRAMUSCULAR; INTRAVENOUS PRN
Status: DISCONTINUED | OUTPATIENT
Start: 2019-03-22 | End: 2019-03-22

## 2019-03-22 RX ORDER — BUPIVACAINE HYDROCHLORIDE 5 MG/ML
INJECTION, SOLUTION PERINEURAL PRN
Status: DISCONTINUED | OUTPATIENT
Start: 2019-03-22 | End: 2019-03-22 | Stop reason: HOSPADM

## 2019-03-22 RX ORDER — FENTANYL CITRATE 50 UG/ML
25-50 INJECTION, SOLUTION INTRAMUSCULAR; INTRAVENOUS
Status: DISCONTINUED | OUTPATIENT
Start: 2019-03-22 | End: 2019-03-22 | Stop reason: HOSPADM

## 2019-03-22 RX ORDER — SODIUM CHLORIDE, SODIUM LACTATE, POTASSIUM CHLORIDE, CALCIUM CHLORIDE 600; 310; 30; 20 MG/100ML; MG/100ML; MG/100ML; MG/100ML
INJECTION, SOLUTION INTRAVENOUS CONTINUOUS
Status: DISCONTINUED | OUTPATIENT
Start: 2019-03-22 | End: 2019-03-22 | Stop reason: HOSPADM

## 2019-03-22 RX ORDER — HYDROMORPHONE HYDROCHLORIDE 1 MG/ML
.3-.5 INJECTION, SOLUTION INTRAMUSCULAR; INTRAVENOUS; SUBCUTANEOUS EVERY 10 MIN PRN
Status: DISCONTINUED | OUTPATIENT
Start: 2019-03-22 | End: 2019-03-22 | Stop reason: HOSPADM

## 2019-03-22 RX ORDER — OXYCODONE HYDROCHLORIDE 5 MG/1
5-10 TABLET ORAL EVERY 4 HOURS PRN
Qty: 40 TABLET | Refills: 0 | Status: SHIPPED | OUTPATIENT
Start: 2019-03-22 | End: 2019-03-24 | Stop reason: SINTOL

## 2019-03-22 RX ORDER — ONDANSETRON 2 MG/ML
INJECTION INTRAMUSCULAR; INTRAVENOUS PRN
Status: DISCONTINUED | OUTPATIENT
Start: 2019-03-22 | End: 2019-03-22

## 2019-03-22 RX ORDER — NALOXONE HYDROCHLORIDE 0.4 MG/ML
.1-.4 INJECTION, SOLUTION INTRAMUSCULAR; INTRAVENOUS; SUBCUTANEOUS
Status: DISCONTINUED | OUTPATIENT
Start: 2019-03-22 | End: 2019-03-22 | Stop reason: HOSPADM

## 2019-03-22 RX ORDER — ONDANSETRON 2 MG/ML
4 INJECTION INTRAMUSCULAR; INTRAVENOUS EVERY 30 MIN PRN
Status: DISCONTINUED | OUTPATIENT
Start: 2019-03-22 | End: 2019-03-22 | Stop reason: HOSPADM

## 2019-03-22 RX ORDER — HYDRALAZINE HYDROCHLORIDE 20 MG/ML
2.5-5 INJECTION INTRAMUSCULAR; INTRAVENOUS EVERY 10 MIN PRN
Status: DISCONTINUED | OUTPATIENT
Start: 2019-03-22 | End: 2019-03-22 | Stop reason: HOSPADM

## 2019-03-22 RX ORDER — CEFAZOLIN SODIUM IN 0.9 % NACL 3 G/100 ML
3 INTRAVENOUS SOLUTION, PIGGYBACK (ML) INTRAVENOUS
Status: COMPLETED | OUTPATIENT
Start: 2019-03-22 | End: 2019-03-22

## 2019-03-22 RX ORDER — OXYCODONE HYDROCHLORIDE 5 MG/1
5 TABLET ORAL
Status: COMPLETED | OUTPATIENT
Start: 2019-03-22 | End: 2019-03-22

## 2019-03-22 RX ORDER — MEPERIDINE HYDROCHLORIDE 25 MG/ML
12.5 INJECTION INTRAMUSCULAR; INTRAVENOUS; SUBCUTANEOUS
Status: DISCONTINUED | OUTPATIENT
Start: 2019-03-22 | End: 2019-03-22 | Stop reason: HOSPADM

## 2019-03-22 RX ORDER — LIDOCAINE HYDROCHLORIDE 20 MG/ML
INJECTION, SOLUTION INFILTRATION; PERINEURAL PRN
Status: DISCONTINUED | OUTPATIENT
Start: 2019-03-22 | End: 2019-03-22

## 2019-03-22 RX ORDER — CEFAZOLIN SODIUM 1 G/3ML
1 INJECTION, POWDER, FOR SOLUTION INTRAMUSCULAR; INTRAVENOUS SEE ADMIN INSTRUCTIONS
Status: DISCONTINUED | OUTPATIENT
Start: 2019-03-22 | End: 2019-03-22 | Stop reason: HOSPADM

## 2019-03-22 RX ORDER — TIZANIDINE 2 MG/1
2-4 TABLET ORAL 3 TIMES DAILY PRN
Qty: 40 TABLET | Refills: 1 | Status: SHIPPED | OUTPATIENT
Start: 2019-03-22 | End: 2019-09-11

## 2019-03-22 RX ORDER — METOPROLOL TARTRATE 1 MG/ML
1-2 INJECTION, SOLUTION INTRAVENOUS EVERY 5 MIN PRN
Status: DISCONTINUED | OUTPATIENT
Start: 2019-03-22 | End: 2019-03-22 | Stop reason: HOSPADM

## 2019-03-22 RX ORDER — LIDOCAINE 40 MG/G
CREAM TOPICAL
Status: DISCONTINUED | OUTPATIENT
Start: 2019-03-22 | End: 2019-03-22 | Stop reason: HOSPADM

## 2019-03-22 RX ORDER — HYDRALAZINE HYDROCHLORIDE 20 MG/ML
5 INJECTION INTRAMUSCULAR; INTRAVENOUS EVERY 10 MIN PRN
Status: DISCONTINUED | OUTPATIENT
Start: 2019-03-22 | End: 2019-03-22 | Stop reason: HOSPADM

## 2019-03-22 RX ORDER — PROPOFOL 10 MG/ML
INJECTION, EMULSION INTRAVENOUS PRN
Status: DISCONTINUED | OUTPATIENT
Start: 2019-03-22 | End: 2019-03-22

## 2019-03-22 RX ORDER — ACETAMINOPHEN 325 MG/1
975 TABLET ORAL EVERY 8 HOURS PRN
Qty: 50 TABLET | Refills: 1 | Status: SHIPPED | OUTPATIENT
Start: 2019-03-22 | End: 2019-09-11

## 2019-03-22 RX ADMIN — DEXAMETHASONE SODIUM PHOSPHATE 10 MG: 10 INJECTION, SOLUTION INTRAMUSCULAR; INTRAVENOUS at 15:04

## 2019-03-22 RX ADMIN — HYDROMORPHONE HYDROCHLORIDE 0.5 MG: 1 INJECTION, SOLUTION INTRAMUSCULAR; INTRAVENOUS; SUBCUTANEOUS at 15:22

## 2019-03-22 RX ADMIN — SUGAMMADEX 200 MG: 100 INJECTION, SOLUTION INTRAVENOUS at 16:24

## 2019-03-22 RX ADMIN — FENTANYL CITRATE 50 MCG: 50 INJECTION, SOLUTION INTRAMUSCULAR; INTRAVENOUS at 14:55

## 2019-03-22 RX ADMIN — FENTANYL CITRATE 50 MCG: 50 INJECTION, SOLUTION INTRAMUSCULAR; INTRAVENOUS at 16:34

## 2019-03-22 RX ADMIN — PROPOFOL 300 MG: 10 INJECTION, EMULSION INTRAVENOUS at 14:56

## 2019-03-22 RX ADMIN — HYDRALAZINE HYDROCHLORIDE 5 MG: 20 INJECTION INTRAMUSCULAR; INTRAVENOUS at 17:45

## 2019-03-22 RX ADMIN — LIDOCAINE HYDROCHLORIDE 100 MG: 20 INJECTION, SOLUTION INFILTRATION; PERINEURAL at 14:56

## 2019-03-22 RX ADMIN — MIDAZOLAM 1 MG: 1 INJECTION INTRAMUSCULAR; INTRAVENOUS at 14:52

## 2019-03-22 RX ADMIN — SODIUM CHLORIDE, POTASSIUM CHLORIDE, SODIUM LACTATE AND CALCIUM CHLORIDE: 600; 310; 30; 20 INJECTION, SOLUTION INTRAVENOUS at 14:18

## 2019-03-22 RX ADMIN — KETOROLAC TROMETHAMINE 30 MG: 30 INJECTION, SOLUTION INTRAMUSCULAR at 16:20

## 2019-03-22 RX ADMIN — MIDAZOLAM 1 MG: 1 INJECTION INTRAMUSCULAR; INTRAVENOUS at 14:50

## 2019-03-22 RX ADMIN — ROCURONIUM BROMIDE 50 MG: 10 INJECTION INTRAVENOUS at 14:56

## 2019-03-22 RX ADMIN — ONDANSETRON 4 MG: 2 INJECTION INTRAMUSCULAR; INTRAVENOUS at 16:09

## 2019-03-22 RX ADMIN — HYDRALAZINE HYDROCHLORIDE 5 MG: 20 INJECTION INTRAMUSCULAR; INTRAVENOUS at 18:18

## 2019-03-22 RX ADMIN — CEFAZOLIN 3 G: 1 INJECTION, POWDER, FOR SOLUTION INTRAMUSCULAR; INTRAVENOUS at 15:15

## 2019-03-22 RX ADMIN — FENTANYL CITRATE 50 MCG: 50 INJECTION, SOLUTION INTRAMUSCULAR; INTRAVENOUS at 14:52

## 2019-03-22 RX ADMIN — LIDOCAINE HYDROCHLORIDE 1 ML: 10 INJECTION, SOLUTION EPIDURAL; INFILTRATION; INTRACAUDAL; PERINEURAL at 14:17

## 2019-03-22 RX ADMIN — HYDRALAZINE HYDROCHLORIDE 10 MG: 20 INJECTION INTRAMUSCULAR; INTRAVENOUS at 18:33

## 2019-03-22 RX ADMIN — OXYCODONE HYDROCHLORIDE 5 MG: 5 TABLET ORAL at 17:57

## 2019-03-22 RX ADMIN — FENTANYL CITRATE 50 MCG: 50 INJECTION, SOLUTION INTRAMUSCULAR; INTRAVENOUS at 16:26

## 2019-03-22 ASSESSMENT — PATIENT HEALTH QUESTIONNAIRE - PHQ9: SUM OF ALL RESPONSES TO PHQ QUESTIONS 1-9: 0

## 2019-03-22 ASSESSMENT — ANXIETY QUESTIONNAIRES: GAD7 TOTAL SCORE: 0

## 2019-03-22 NOTE — INTERVAL H&P NOTE
This H&P has been reviewed and there are no clinically significant changes in the patient s condition.  The patient was evaluated by myself as well as Artem RUBIO prior to surgery. The Patient is approved for the surgery and the stated surgical procedure is still clinically indicated.

## 2019-03-22 NOTE — ANESTHESIA PREPROCEDURE EVALUATION
Anesthesia Pre-Procedure Evaluation    Patient: Delbert Dowling   MRN: [id number] : 1965          Preoperative Diagnosis: Left ankle fracture    Procedure(s):  OPEN REDUCTION INTERNAL FIXATION LEFT ANKLE    Past Medical History:   Diagnosis Date     Depression      History of tobacco abuse     quit      HTN (hypertension)      Past Surgical History:   Procedure Laterality Date     NO HISTORY OF SURGERY         Anesthesia Evaluation     . Pt has not had prior anesthetic            ROS/MED HX    ENT/Pulmonary:     (+)CLAIR risk factors snores loudly, hypertension, obese, daytime somnolence, , . .    Neurologic:  - neg neurologic ROS     Cardiovascular:     (+) hypertension----. : . . . :. . Previous cardiac testing date:results:date: results:ECG reviewed date:3/21/19 results:SB rate 58 date: results:          METS/Exercise Tolerance:     Hematologic:         Musculoskeletal:         GI/Hepatic:  - neg GI/hepatic ROS       Renal/Genitourinary:  - ROS Renal section negative   (+) Pt has no history of transplant,       Endo:     (+) Obesity, .      Psychiatric:     (+) psychiatric history depression      Infectious Disease:  - neg infectious disease ROS       Malignancy:      - no malignancy   Other:    (+) No chance of pregnancy C-spine cleared: N/A, no H/O Chronic Pain,                               Lab Results   Component Value Date    WBC 7.9 2019    HGB 14.5 2019    HCT 42.1 2019     2019     2019    POTASSIUM 4.2 2019    CHLORIDE 103 2019    CO2 28 2019    BUN 15 2019    CR 0.92 2019     (H) 2019    ANTHONY 8.9 2019    ALBUMIN 3.9 2019    PROTTOTAL 7.3 2019    ALT 30 2019    AST 19 2019    ALKPHOS 58 2019    BILITOTAL 0.7 2019       Preop Vitals  BP Readings from Last 3 Encounters:   19 122/70   19 121/77   19 124/84    Pulse Readings from Last 3 Encounters:   19  "66   05/07/18 69   05/01/18 70      Resp Readings from Last 3 Encounters:   03/21/19 18   03/13/19 18   05/07/18 16    SpO2 Readings from Last 3 Encounters:   03/21/19 95%   03/13/19 97%   05/07/18 93%      Temp Readings from Last 1 Encounters:   03/21/19 98  F (36.7  C) (Temporal)    Ht Readings from Last 1 Encounters:   03/20/19 1.816 m (5' 11.5\")      Wt Readings from Last 1 Encounters:   03/20/19 (!) 167.8 kg (370 lb)    Estimated body mass index is 50.89 kg/m  as calculated from the following:    Height as of 3/20/19: 1.816 m (5' 11.5\").    Weight as of 3/20/19: 167.8 kg (370 lb).       Anesthesia Plan      History & Physical Review  History and physical reviewed and following examination; no interval change.    ASA Status:  3 .    NPO Status:  > 8 hours    Plan for ETT and General with Intravenous and Propofol induction. Maintenance will be Balanced.    PONV prophylaxis:  Ondansetron (or other 5HT-3) and Dexamethasone or Solumedrol  Additional equipment: Videolaryngoscope      Postoperative Care  Postoperative pain management:  Oral pain medications and IV analgesics.      Consents  Anesthetic plan, risks, benefits and alternatives discussed with:  Patient..                 NANDO Thomas CRNA  "

## 2019-03-22 NOTE — DISCHARGE INSTRUCTIONS
Open Reduction and Internal Fixation of Ankle Discharge Instructions  Dr. Rivera                                   [phone]  Bone and Joint Service Line for issues or concerns    Discharge prescriptions:  Norco 5/325 mg 1-2 tabs every 4-6 hours as needed for pain #90  Colace 100mg twice a day while taking narcotics #60  Aspirin 325 mg once a day for 4-6 weeks     General Care:  After surgery you may feel tired/sleepy. This is normal. If you have any question along the way please contact the office. If you feel it is an issue cannot wait for normal office hours, contact the on-call physician. You should not drive or operate machines/equipment until released by your physician to do so. You should have someone stay with you for at least 24 hours after surgery.   Elevate your leg with a couple of pillows under your ankle/calf area frequently over the first week.    Bandages/Splint:   Your physician will remove your splint at your first follow up appointment. Keep it clean and dry.     Bathing:  Do not get your splint/bandage wet. Keep it covered with a bag when bathing.    Follow up:  Your follow up appointment should already be scheduled. If its not, please call the office to verify an appointment 10-14 days after surgery.     Diet:  Start with non-alcoholic liquids at first, particularly water or sports drinks after surgery. Progress to bland foods such as crackers and bread and finally to your normal diet if you have no problems. Avoid alcohol when taking narcotic pain medications.      Pain control:  Take your pain medications as prescribed. These medications may make you sleepy. Do not drive, operate equipment, or drink alcohol when taking these.  You may take Tylenol (Generic name is acetaminophen) as directed on the bottle in place of the prescribed pain medications as your pain gets better. Do not take any other NSAIDs (Motrin, Advil, Ibuprofen, Aleve, Naproxen) while taking Aspirin. If the medications cause  a reaction such as nausea or skin rash, stop taking them and contact your doctor. Please plan accordingly, pain medications will not be re-filled on the weekends or at night. Call the office during the day if you need more medications.    Blood thinner:  It is very important to take the Aspirin as prescribed. It is a medication to help prevent blood clots in your legs or lungs. No medication is perfect, so if you notice a sudden onset of pain/swelling in your calf area call your doctor. If you notice a sudden onset of troubles breathing and/or chest pain call 911.     Icing:  It is common for some swelling, aching and stiffness to occur for a few months after surgery. Apply ice for 20 minutes at a time. For the first 1-2 weeks apply ice 2-3 x day or more. Make sure not to get your cast/splint wet as the ice melts.     Walker/crutches:  Use a walker/crutches when you go home. You will transition to the use of a cane and finally to no additional support as your fracture heals.     Braces:  Depending on your injury you may have a removable walking boot. Wear it as directed by your physician.    Physical Therapy:  Depending on your injury and overall health you may benefit from physical therapy. This is decision that you and your physician will determine. Therapy may begin right away or after 4-6 weeks depending on your injury. We will talk about therapy at your first post-operative visit.     Activity:  Unless otherwise instructed, do not place weight on your ankle.  At some point you will gradually begin to partially weight bear and finally progress to full weight bearing. Your injury and your x-rays will determine this as you heal.      Normal findings after surgery:  Numbness and tenderness around the incisions is normal.  You may have bruising around the incisions and down into the foot.   Low grade fevers less than 100.5 degrees Fahrenheit are normal.     When to call the Office:  Temperature greater than 101.5  degrees Fahreheit.  Fever, chills, and increasing pain in the ankle.  Excessive drainage from the incisions that include bright red blood.  Drainage from the incisions sites that appear yellow, pus-like, or foul smelling.  Increasing pain the ankle not relieved by the prescribed pain medications or ice.  Increased pain or swelling in your calf area (in back above your ankle) that wasn t there when in the hospital.  Any other effects you feel are significant.  Call 911 if you experience any chest pain and/or shortness or breath.    [facility] Same-Day Surgery   Adult Discharge Orders & Instructions     For 24 hours after surgery    1. Get plenty of rest.  A responsible adult must stay with you for at least 24 hours after you leave the hospital.   2. Do not drive or use heavy equipment.  If you have weakness or tingling, don't drive or use heavy equipment until this feeling goes away.  3. Do not drink alcohol.  4. Avoid strenuous or risky activities.  Ask for help when climbing stairs.   5. You may feel lightheaded.  If so, sit for a few minutes before standing.  Have someone help you get up.   6. You may have a slight fever. Call the doctor if your fever is over 100 F (37.7 C) (taken under the tongue) or lasts longer than 24 hours.  7. You may have a dry mouth, a sore throat, muscle aches or trouble sleeping.  These should go away after 24 hours.  8. Do not make important or legal decisions.  We don t expect you to have any problems from the surgery or treatment you had today. Just in case, here s what to do if you have pain, upset stomach (nausea), bleeding or infection:  Pain:  Take medicines your doctor has prescribed or over-the-counter medicine they have suggested. Resting and using ice packs can help, too. For surgery on an arm or leg, raise it on a pillow to ease swelling. Call your doctor if these methods don t work.  Copyright [employer], Licensed under [employer]  Upset stomach  (nausea):  Take anti-nausea medicine approved by your doctor. Drink clear liquids like apple juice, ginger ale, broth or 7-Up. Be sure to drink enough fluids. Rest can help, too. Move to normal foods when you re ready. Bleeding:  In the first 24 hours, you may see a little blood on your dressing, about the size of a quarter. You don t need to worry about this much blood, but if the blood spot keeps getting bigger:    Put pressure on the wound if you can, AND    Call your doctor.  Copyright [employer], Licensed under [employer]  Fever/Infection: Please call your doctor if you have any of these signs:    Redness    Swelling    Wound feels warm    Pain gets worse    Bad-smelling fluid leaks from wound    Fever or chills  Call your doctor for any of the followin.  It has been over 8 to 10 hours since surgery and you are still not able to urinate (pass water).    Nurse advice line: [phone]

## 2019-03-22 NOTE — ANESTHESIA POSTPROCEDURE EVALUATION
Patient: Delbert Dowling    Procedure(s):  OPEN REDUCTION INTERNAL FIXATION LEFT ANKLE    Diagnosis:Left ankle fracture  Diagnosis Additional Information: No value filed.    Anesthesia Type:  ETT, General    Note:  Anesthesia Post Evaluation    Patient location during evaluation: Phase 2  Patient participation: Able to fully participate in evaluation  Level of consciousness: awake and alert  Pain management: adequate  Airway patency: patent  Cardiovascular status: blood pressure returned to baseline  Respiratory status: spontaneous ventilation and room air  Hydration status: acceptable  PONV: none     Anesthetic complications: None    Comments: Patient was very pleased with his anesthetic today. He is currently resting without complaint.  He has a small amount of swelling on the left tip of his tongue. I explained to the patient and his wife that this was most likely from biting his tongue upon waking up or from the oral airway.  We discussed that it may last several days and ice chips and or cold beverages could help the swelling.  Will follow as necessary.         Last vitals:  Vitals:    03/22/19 1800 03/22/19 1815 03/22/19 1830   BP: (!) 160/124 (!) 163/104 (!) 164/115   Pulse: 81 82 84   Resp:      Temp:      SpO2: 95% 95% 95%         Electronically Signed By: NANDO Thomas CRNA  March 22, 2019  6:32 PM

## 2019-03-22 NOTE — ANESTHESIA CARE TRANSFER NOTE
Patient: Delbert Dowling    Procedure(s):  OPEN REDUCTION INTERNAL FIXATION LEFT ANKLE    Diagnosis: Left ankle fracture  Diagnosis Additional Information: No value filed.    Anesthesia Type:   ETT, General     Note:  Airway :Face Mask  Patient transferred to:PACU  Handoff Report: Identifed the Patient, Identified the Reponsible Provider, Reviewed the pertinent medical history, Discussed the surgical course, Reviewed Intra-OP anesthesia mangement and issues during anesthesia, Set expectations for post-procedure period and Allowed opportunity for questions and acknowledgement of understanding      Vitals: (Last set prior to Anesthesia Care Transfer)    CRNA VITALS  3/22/2019 1627 - 3/22/2019 1657      3/22/2019             Pulse:  85    SpO2:  91 %                Electronically Signed By: NANDO Thomas CRNA  March 22, 2019  4:57 PM

## 2019-03-22 NOTE — BRIEF OP NOTE
[facility] Brief Operative Note    Pre-operative diagnosis: left ankle lateral and posterior malleolus fracture   Post-operative diagnosis: left ankle lateral and posterior malleolus fracture with syndesmosis disruption    Procedure: Procedure(s):  Left ankle open reduction and internal fixation of lateral malleolus and syndesmosis    Surgeon:  Anesthesia: Amrit Rivera DO  General   Assistant(s): Jason Watts, APRN, CNP, DNP (A advanced practice provider was necessary for his expertise, exposure and surgical assistance throughout the case.)   Estimated blood loss:  Fluids:  Implant:  TT/Pressure: Less than 50 ml  1200 ml Crystalloids  Arthrex 12 hole 1/3 tubular, tight rope  54 minutes at 300mmHg   Specimens: None   Findings: See dictated operative report for full details [id number]     Mariusz Rivera D.O.

## 2019-03-22 NOTE — ANESTHESIA CARE TRANSFER NOTE
Patient: Delbert Dowling    Procedure(s):  OPEN REDUCTION INTERNAL FIXATION LEFT ANKLE    Diagnosis: Left ankle fracture  Diagnosis Additional Information: No value filed.    Anesthesia Type:   ETT, General     Note:  Airway :Face Mask  Patient transferred to:PACU  Handoff Report: Identifed the Patient, Identified the Reponsible Provider, Reviewed the pertinent medical history, Discussed the surgical course, Reviewed Intra-OP anesthesia mangement and issues during anesthesia, Set expectations for post-procedure period and Allowed opportunity for questions and acknowledgement of understanding      Vitals: (Last set prior to Anesthesia Care Transfer)    CRNA VITALS  3/22/2019 1622 - 3/22/2019 1722      3/22/2019             Pulse:  85    SpO2:  91 %                Electronically Signed By: NANDO Thomas CRNA  March 22, 2019  5:52 PM

## 2019-03-22 NOTE — ANESTHESIA POSTPROCEDURE EVALUATION
Patient: Delbert Dowling    Procedure(s):  OPEN REDUCTION INTERNAL FIXATION LEFT ANKLE    Diagnosis:Left ankle fracture  Diagnosis Additional Information: No value filed.    Anesthesia Type:  ETT, General    Note:  Anesthesia Post Evaluation    Patient location during evaluation: Phase 2  Patient participation: Able to fully participate in evaluation  Level of consciousness: awake and alert  Pain management: adequate  Airway patency: patent  Cardiovascular status: blood pressure returned to baseline  Respiratory status: spontaneous ventilation  Hydration status: acceptable  PONV: none     Anesthetic complications: None    Comments: Patient was pleased with his anesthetic today. His pain is being well managed with pain medications at this time. Will follow as needed. No anesthesia concerns.         Last vitals:  Vitals:    03/22/19 1705 03/22/19 1710 03/22/19 1715   BP: (!) 150/104 (!) 131/96 (!) 136/96   Pulse: 82 83 82   Resp: 11 11 16   Temp:      SpO2: 91% 93% 96%         Electronically Signed By: NANDO Thomas CRNA  March 22, 2019  5:48 PM

## 2019-03-23 NOTE — OP NOTE
Procedure Date: 03/22/2019      PREOPERATIVE DIAGNOSIS:  Left ankle displaced lateral malleolus fracture and posterior malleolus fracture.      POSTOPERATIVE DIAGNOSIS:  Left ankle lateral and posterior malleolus fractures with syndesmosis disruption.      PROCEDURE PERFORMED:  Left ankle open reduction and internal fixation, lateral malleolus and syndesmosis.      SURGEON:  Amrit Rivera DO      ASSISTANT:  Jason Watts, nurse practitioner.  His assistance was utilized throughout procedure, assisting with soft tissue retraction, assisting with reduction, implant placement.      ANESTHESIA:  General.      COMPLICATIONS:  None.      ESTIMATED BLOOD LOSS:  Less than 50 mL.      FLUIDS:  1200 mL crystalloids.      COUNTS:  Correct.      DISPOSITION:  PACU in stable condition.      IMPLANTS:  Include Arthrex 12-hole 1/3 tibial plate and 1 TightRope.      GROSS FINDINGS:  There was a long oblique fibular fracture.  The bone was of good quality.  The posterior malleolus was a very small fragment that keyed into position with reduction of the lateral malleolus.  Once fixation was done, the syndesmosis was tested showing a widening of the medial clear space.  I opted to place a single TightRope for this.        INDICATIONS FOR PROCEDURE: This is a 53-year-old male who slipped on 05/13/2019.  Subsequently, seen in my office.  He had a displaced lateral malleolus fracture as well as essentially nondisplaced posterior malleolus fracture.  Given displacement of the lateral malleolus associated with some shortening, we discussed operative fixation.  The risks, benefits, complications reviewed.  Risks including bleeding, infection, neurovascular injury, numbness, tingling around the incision, malunion, nonunion, hardware failure, reviewed.  Timeframe for recovery discussed.  Once this was reviewed, he opted to proceed.      DETAILS OF PROCEDURE:  He was met preoperatively again informed consent was verified, appropriate extremity  was marked, and he was wheeled to operative suite #3.  Transferred to the OR table with no issues.  When deemed appropriate by Anesthesia, the left lower extremity was sterilely prepped and draped in normal manner.  Prior to incision, a timeout was performed.  Again, the appropriate patient, surgery and extremity were verified and antibiotics administered.  An Esmarch was utilized to exsanguinate the extremity, tourniquet was inflated to 300 mmHg for a total of 54 minutes on his upper thigh.  A longitudinal skin incision over the lateral malleolus was performed centering over the fracture fragments.  A combination of blunt and sharp dissection through the skin and subcutaneous tissue.  Care was taken to protect the superficial peroneal nerve.  The fascia over the overlying muscles was released to allow for visualization.  The fracture was visualized.  Cleaning interposed soft tissue out, I utilized some reduction clamps, and I was able to reduce the bone back together very nicely getting a good cortical read.  With this complete, I opted to place a total of two 3.5 screws in a lag formation, overdrilling the near cortex with the drilling the far.  These were placed in standard AO fashion with no issues with excellent compression at the bone.  I was able to visualize the tips showing they were not prominent posteriorly.  With this completed, took a 1/3 tubular plate.  I opted for a 12-hole plate, providing fixation.  I placed a total of 4 screws proximal in standard AO fashion with good purchase in the bone and subsequently 3 screws distally utilizing some cancellous screws.  All the screws had good purchase in the bone.  There was no prominence particularly distally along the joint.  With this completed, I performed an examination of the syndesmosis performing a cotton test.  With this, it showed some widening of the medial clear space as well as widening between the tibia and fibula.  Secondary to this, I did  remove one of the distal screws, which was approximately 1.5 cm above the mortise.  I placed a drill utilizing the TightRope from a lateral to medial direction.  This went through all 4 cortices.  With this completed, I passed the TightRope with the needle.  Once through the other side, I did make a small incision to assure the TightRope was down with no interposed neurovascular structures.        With this complete, holding the syndesmosis reduced, the TightRope was secured down.  Fluoroscopy images were taken showing acceptable reduction.  No further instability.  The wounds were irrigated.  The tourniquet deflated.  The fascia over the deep tissues was approximated with 2-0 Vicryl, covering the plate.  This was followed with closure of the subcutaneous tissue and staples on the skin.  A sterile bandage was applied.  This followed a Sheldon Gu splint.      He was subsequently transferred to PACU in stable condition.      He will be discharged home with oxycodone, Zanaflex, Tylenol for pain.  Followup appointment has been scheduled, discharge instructions provided.         NANCY DELAROSA DO             D: 2019   T: 2019   MT: ADRIEN      Name:     WILEY SMITH   MRN:      [id number]        Account:        [id number]   :      1965           Procedure Date: 2019      Document: [id number]

## 2019-03-24 ENCOUNTER — NURSE TRIAGE (OUTPATIENT)
Dept: NURSING | Facility: CLINIC | Age: 54
End: 2019-03-24

## 2019-03-24 ENCOUNTER — TELEPHONE (OUTPATIENT)
Dept: ORTHOPEDICS | Facility: CLINIC | Age: 54
End: 2019-03-24

## 2019-03-24 DIAGNOSIS — G89.18 POSTOPERATIVE PAIN: ICD-10-CM

## 2019-03-24 DIAGNOSIS — S82.392A CLOSED FRACTURE OF POSTERIOR MALLEOLUS OF LEFT TIBIA, INITIAL ENCOUNTER: ICD-10-CM

## 2019-03-24 DIAGNOSIS — B37.0 THRUSH, ORAL: Primary | ICD-10-CM

## 2019-03-24 DIAGNOSIS — S82.62XA CLOSED DISPLACED FRACTURE OF LATERAL MALLEOLUS OF LEFT FIBULA, INITIAL ENCOUNTER: ICD-10-CM

## 2019-03-24 RX ORDER — HYDROCODONE BITARTRATE AND ACETAMINOPHEN 5; 325 MG/1; MG/1
1-2 TABLET ORAL EVERY 6 HOURS PRN
Qty: 40 TABLET | Refills: 0 | Status: SHIPPED | OUTPATIENT
Start: 2019-03-24 | End: 2019-03-28

## 2019-03-24 RX ORDER — FLUCONAZOLE 200 MG/1
200 TABLET ORAL DAILY
Qty: 14 TABLET | Refills: 0 | Status: SHIPPED | OUTPATIENT
Start: 2019-03-24 | End: 2019-04-05

## 2019-03-24 RX ORDER — NYSTATIN 100000/ML
500000 SUSPENSION, ORAL (FINAL DOSE FORM) ORAL 4 TIMES DAILY
Qty: 280 ML | Refills: 0 | Status: SHIPPED | OUTPATIENT
Start: 2019-03-24 | End: 2019-03-24

## 2019-03-24 NOTE — TELEPHONE ENCOUNTER
Received call from surgeon patient's spouse concerned he has thrush. Called and spoke with spouse states she has a lot of experience with this as her mother frequency has it, and she has had it before.  Described as a thickish white coating to the tongue.  She described he did have a lac on his tongue from surgery.   S/p POD 2 from distal fibula ORIF.  Having no issues swallowing or breathing.    Rx of Nystatin 500,000 units swish and swallow 4x day for 14 days prescribed sent to [facility] per request.  Recommended if not improving to follow up PCP.      Also spouse mentioned that the oxycodone is giving him unpleasant side effects including feeling weird, off, and unpleasant.  Per spouse the patient had a few vicodin from pre-op from his PCP and tried this and was able to control pain without side effects. She states she would like to be switched to vicodin and disposed of the oxycodone.  They have enough for tonight.  Will write a new Rx tomorrow AM and leave at specialty lower level desk for Vicodin.  Spouse aware of both Rxs.       Per spouse other than some discomfort and pain the ankle is doing very well. Patient struggling with some back spasms, does have Zanaflex, will try this as well.  No falls or injuries.     NANDO Clay, CNP  Orthopedic Surgery    Addendum: received word from pharmacy, Nystatin is not available due to shortage.  Spoke with medicine, they recommend oral fluconazole 200mg day 1 then 200mg daily for 7-14 days.  Called and informed spouse.  Medication sent to [facility].    Vicodin Rx printed, signed, and left in an envelope with patient's name at desk at lower level speciality for patient to  tomorrow AM.  Patient aware of both Rx.     Limit tylenol to 3000mg to 4000mg a day from all sources.

## 2019-03-24 NOTE — TELEPHONE ENCOUNTER
Patient has thrush on tongue. He bit his tongue post op while recovering in hospital for tib fibula repair.  Dr Rivera was his surgeon. They're requesting something to treat thrush on his tongue.  Dr Rivera paged to call patient back directly by Page .  I advised the wife to call back if they have not heard from the on call HCP within 30 minutes.  Malinda Dominguez RN-[facility]

## 2019-03-25 NOTE — OR NURSING
"[facility] Same Day Surgery  Discharge Call Back  Delbert Dowling  1965  MRN: [id number]  Home: [phone] (home) [phone] (work)  PCP: Lissa Gamez    We are calling to see how you're doing since your surgery/procedure with us?   Comments: \"I'm doing pretty well\"  Clinical Questions  1. Have you had time to look at your discharge instructions? Do you have any questions in regards to the instructions?   Comment: yes  2. Do you feel your pain is being controlled with the regimen the surgeon sent you home on? (ie: prescription medications, over the counter pain medications, ice packs)   Comments: no  3. Have you noticed any drainage on your dressing? Do you know what to do if you have bleeding as a result of your procedure?   Comments: no/yes  4. Have you had any nausea/vomiting? Do you know how to treat this?   Comment: no/yes  5. Have you had any signs/symptoms of infection? (ie: fever, swelling, heat, drainage or redness) Do you know what to do if you have?   Comment: no/yes  6. Do you have a follow up appointment made with your surgeon? Do you have a number to contact them at if you need it?   Comment: yes/yes  Retained Foreign Object (KRIS, Hemovac, Penrose, Wound Packing, Vaginal Packing, Nasal Splints, Urethral Stents, Montilla Catheter)  1. Do you still have NA in place?   2. If the item is still in place, can you review the plan for removal with me? NA      You may be randomly selected to fill out a [facility] survey. We would appreciate you taking the time to fill this out. It is important to us if you would answer all of the questions on the survey.            "

## 2019-03-28 ENCOUNTER — HOSPITAL ENCOUNTER (OUTPATIENT)
Facility: CLINIC | Age: 54
Setting detail: OBSERVATION
Discharge: HOME | End: 2019-03-29
Attending: FAMILY MEDICINE | Admitting: FAMILY MEDICINE
Payer: COMMERCIAL

## 2019-03-28 ENCOUNTER — APPOINTMENT (OUTPATIENT)
Dept: CT IMAGING | Facility: CLINIC | Age: 54
End: 2019-03-28
Attending: FAMILY MEDICINE
Payer: COMMERCIAL

## 2019-03-28 DIAGNOSIS — I26.99 OTHER ACUTE PULMONARY EMBOLISM WITHOUT ACUTE COR PULMONALE (H): ICD-10-CM

## 2019-03-28 PROBLEM — L91.8 SKIN TAG: Status: ACTIVE | Noted: 2019-03-28

## 2019-03-28 PROBLEM — E66.01 MORBID OBESITY (H): Status: ACTIVE | Noted: 2018-05-02

## 2019-03-28 PROBLEM — S82.392D CLOSED FRACTURE OF POSTERIOR MALLEOLUS OF LEFT TIBIA WITH ROUTINE HEALING: Status: ACTIVE | Noted: 2019-03-20

## 2019-03-28 PROBLEM — F33.9 EPISODE OF RECURRENT MAJOR DEPRESSIVE DISORDER, UNSPECIFIED DEPRESSION EPISODE SEVERITY (H): Status: ACTIVE | Noted: 2018-05-01

## 2019-03-28 PROBLEM — F32.9 DEPRESSION, MAJOR: Status: ACTIVE | Noted: 2019-03-28

## 2019-03-28 PROBLEM — I10 ESSENTIAL HYPERTENSION: Status: ACTIVE | Noted: 2018-05-01

## 2019-03-28 PROBLEM — J34.9 DISORDER OF NASAL SINUS: Status: ACTIVE | Noted: 2019-03-28

## 2019-03-28 LAB
ALBUMIN SERPL-MCNC: 3.5 G/DL (ref 3.4–5)
ALP SERPL-CCNC: 69 U/L (ref 40–150)
ALT SERPL W P-5'-P-CCNC: 41 U/L (ref 0–70)
ANION GAP SERPL CALCULATED.3IONS-SCNC: 9 MMOL/L (ref 3–14)
AST SERPL W P-5'-P-CCNC: 26 U/L (ref 0–45)
BASE EXCESS BLDA CALC-SCNC: 3.6 MMOL/L
BASOPHILS # BLD AUTO: 0.1 10E9/L (ref 0–0.2)
BASOPHILS NFR BLD AUTO: 0.5 %
BILIRUB SERPL-MCNC: 0.6 MG/DL (ref 0.2–1.3)
BUN SERPL-MCNC: 18 MG/DL (ref 7–30)
CALCIUM SERPL-MCNC: 8.8 MG/DL (ref 8.5–10.1)
CHLORIDE SERPL-SCNC: 102 MMOL/L (ref 94–109)
CO2 SERPL-SCNC: 28 MMOL/L (ref 20–32)
CREAT SERPL-MCNC: 0.88 MG/DL (ref 0.66–1.25)
DIFFERENTIAL METHOD BLD: NORMAL
EOSINOPHIL NFR BLD AUTO: 0.6 %
ERYTHROCYTE [DISTWIDTH] IN BLOOD BY AUTOMATED COUNT: 12.3 % (ref 10–15)
GFR SERPL CREATININE-BSD FRML MDRD: >90 ML/MIN/{1.73_M2}
GLUCOSE SERPL-MCNC: 141 MG/DL (ref 70–99)
HCO3 BLD-SCNC: 27 MMOL/L (ref 21–28)
HCT VFR BLD AUTO: 40.9 % (ref 40–53)
HGB BLD-MCNC: 13.9 G/DL (ref 13.3–17.7)
IMM GRANULOCYTES # BLD: 0 10E9/L (ref 0–0.4)
IMM GRANULOCYTES NFR BLD: 0.2 %
LYMPHOCYTES # BLD AUTO: 1.6 10E9/L (ref 0.8–5.3)
LYMPHOCYTES NFR BLD AUTO: 16.3 %
MCH RBC QN AUTO: 31.5 PG (ref 26.5–33)
MCHC RBC AUTO-ENTMCNC: 34 G/DL (ref 31.5–36.5)
MCV RBC AUTO: 93 FL (ref 78–100)
MONOCYTES # BLD AUTO: 0.5 10E9/L (ref 0–1.3)
MONOCYTES NFR BLD AUTO: 5.2 %
NEUTROPHILS # BLD AUTO: 7.7 10E9/L (ref 1.6–8.3)
NEUTROPHILS NFR BLD AUTO: 77.2 %
NRBC # BLD AUTO: 0 10*3/UL
NRBC BLD AUTO-RTO: 0 /100
NT-PROBNP SERPL-MCNC: 395 PG/ML (ref 0–900)
O2/TOTAL GAS SETTING VFR VENT: 28 %
PCO2 BLD: 35 MM HG (ref 35–45)
PH BLD: 7.49 PH (ref 7.35–7.45)
PLATELET # BLD AUTO: 170 10E9/L (ref 150–450)
PO2 BLD: 89 MM HG (ref 80–105)
POTASSIUM SERPL-SCNC: 3.6 MMOL/L (ref 3.4–5.3)
PROT SERPL-MCNC: 7.2 G/DL (ref 6.8–8.8)
RADIOLOGIST FLAGS: ABNORMAL
RBC # BLD AUTO: 4.41 10E12/L (ref 4.4–5.9)
SODIUM SERPL-SCNC: 139 MMOL/L (ref 133–144)
TROPONIN I SERPL-MCNC: 0.11 UG/L (ref 0–0.04)
WBC # BLD AUTO: 10 10E9/L (ref 4–11)

## 2019-03-28 PROCEDURE — 85025 COMPLETE CBC W/AUTO DIFF WBC: CPT | Performed by: FAMILY MEDICINE

## 2019-03-28 PROCEDURE — 25000128 H RX IP 250 OP 636: Performed by: FAMILY MEDICINE

## 2019-03-28 PROCEDURE — 99285 EMERGENCY DEPT VISIT HI MDM: CPT | Mod: 25 | Performed by: FAMILY MEDICINE

## 2019-03-28 PROCEDURE — 99220 ZZC INITIAL OBSERVATION CARE,LEVL III: CPT | Mod: AI | Performed by: FAMILY MEDICINE

## 2019-03-28 PROCEDURE — 96372 THER/PROPH/DIAG INJ SC/IM: CPT | Performed by: FAMILY MEDICINE

## 2019-03-28 PROCEDURE — 93005 ELECTROCARDIOGRAM TRACING: CPT | Performed by: FAMILY MEDICINE

## 2019-03-28 PROCEDURE — 84484 ASSAY OF TROPONIN QUANT: CPT | Performed by: FAMILY MEDICINE

## 2019-03-28 PROCEDURE — 36600 WITHDRAWAL OF ARTERIAL BLOOD: CPT

## 2019-03-28 PROCEDURE — 82803 BLOOD GASES ANY COMBINATION: CPT | Performed by: FAMILY MEDICINE

## 2019-03-28 PROCEDURE — G0378 HOSPITAL OBSERVATION PER HR: HCPCS

## 2019-03-28 PROCEDURE — 71260 CT THORAX DX C+: CPT

## 2019-03-28 PROCEDURE — 93010 ELECTROCARDIOGRAM REPORT: CPT | Mod: Z6 | Performed by: FAMILY MEDICINE

## 2019-03-28 PROCEDURE — 83880 ASSAY OF NATRIURETIC PEPTIDE: CPT | Performed by: FAMILY MEDICINE

## 2019-03-28 PROCEDURE — 80053 COMPREHEN METABOLIC PANEL: CPT | Performed by: FAMILY MEDICINE

## 2019-03-28 PROCEDURE — 99207 ZZC CDG-MDM COMPONENT: MEETS MODERATE - UP CODED: CPT | Performed by: FAMILY MEDICINE

## 2019-03-28 RX ORDER — IOPAMIDOL 755 MG/ML
500 INJECTION, SOLUTION INTRAVASCULAR ONCE
Status: COMPLETED | OUTPATIENT
Start: 2019-03-28 | End: 2019-03-28

## 2019-03-28 RX ADMIN — ENOXAPARIN SODIUM 160 MG: 100 INJECTION SUBCUTANEOUS at 20:52

## 2019-03-28 RX ADMIN — IOPAMIDOL 80 ML: 755 INJECTION, SOLUTION INTRAVENOUS at 19:36

## 2019-03-28 NOTE — ED PROVIDER NOTES
History     Chief Complaint   Patient presents with     Shortness of Breath     HPI  Delbert Dowling is a 53 year old male who presents with sudden onset of shortness of breath that started today.  Patient is currently 6 days postop from a left open reduction/internal fixation of the ankle.  Patient was doing okay until this afternoon when he came home and he noticed he was very winded with just a little bit of activity.  He had to sit down and rest and it took him about 15 minutes for him to catch his breath.  He then got up and then went to the bathroom and noticed the symptoms came back again, they once again resolved after 10 minutes of rest.  And then when he came in here tonight even just the activity of going from the car to coming in here he felt very winded when he first got here.  Patient denies any chest pain, denies any upper back pain.  Patient denies any fevers or chills.  Denies a cough.  Patient denies any new swelling in the right leg.  Patient does not have a history of any type of breathing problems.    Allergies:  Allergies   Allergen Reactions     Lisinopril Cough       Problem List:    Patient Active Problem List    Diagnosis Date Noted     Closed fracture of posterior malleolus of left tibia, initial encounter 03/20/2019     Priority: Medium     Closed displaced fracture of lateral malleolus of left fibula, initial encounter 03/20/2019     Priority: Medium     Closed fracture of left tibia and fibula, initial encounter 03/13/2019     Priority: Medium     Morbid obesity (H) 05/02/2018     Priority: Medium     Snoring 05/02/2018     Priority: Medium     Episode of recurrent major depressive disorder, unspecified depression episode severity (H) 05/01/2018     Priority: Medium     Essential hypertension 05/01/2018     Priority: Medium        Past Medical History:    Past Medical History:   Diagnosis Date     Depression      History of tobacco abuse      HTN (hypertension)        Past Surgical  History:    Past Surgical History:   Procedure Laterality Date     NO HISTORY OF SURGERY       OPEN REDUCTION INTERNAL FIXATION ANKLE Left 3/22/2019    Procedure: OPEN REDUCTION INTERNAL FIXATION LEFT ANKLE;  Surgeon: Amrit Rivera DO;  Location: [facility] OR       Family History:    Family History   Problem Relation Age of Onset     Chronic Obstructive Pulmonary Disease Mother      Hypertension Father        Social History:  Marital Status:  Single [1]  Social History     Tobacco Use     Smoking status: Former Smoker     Packs/day: 1.00     Years: 20.00     Pack years: 20.00     Smokeless tobacco: Never Used     Tobacco comment: quit 13 years    Substance Use Topics     Alcohol use: Yes     Comment: none      Drug use: No        Medications:      acetaminophen (TYLENOL) 325 MG tablet   atenolol (TENORMIN) 100 MG tablet   buPROPion (WELLBUTRIN XL) 150 MG 24 hr tablet   citalopram (CELEXA) 40 MG tablet   fluconazole (DIFLUCAN) 200 MG tablet   hydrochlorothiazide (HYDRODIURIL) 25 MG tablet   Multiple Vitamins-Minerals (MULTIVITAMIN ADULT PO)   senna-docusate (SENOKOT-S/PERICOLACE) 8.6-50 MG tablet   tiZANidine (ZANAFLEX) 2 MG tablet         Review of Systems   All other systems reviewed and are negative.      Physical Exam   BP: (!) 157/93  Pulse: 98  Temp: 99  F (37.2  C)  Resp: 16  Weight: (!) 165.6 kg (365 lb)  SpO2: (!) 86 %      Physical Exam   Constitutional: He is oriented to person, place, and time. He appears well-developed and well-nourished. No distress.   HENT:   Head: Normocephalic and atraumatic.   Mouth/Throat: Oropharynx is clear and moist.   Eyes: Conjunctivae are normal.   Neck: Normal range of motion.   Cardiovascular: Normal rate, regular rhythm and normal heart sounds.   No murmur heard.  Pulmonary/Chest: Effort normal and breath sounds normal. No stridor. No respiratory distress. He has no wheezes.   Abdominal: Soft. Bowel sounds are normal. He exhibits no distension. There is no tenderness.  There is no guarding.   Musculoskeletal: Normal range of motion. He exhibits no edema.   Neurological: He is alert and oriented to person, place, and time. He has normal strength.   Skin: Skin is warm and dry. No rash noted. He is not diaphoretic.   Psychiatric: He has a normal mood and affect. Judgment normal.   Nursing note and vitals reviewed.      ED Course        Procedures                EKG Interpretation:      Interpreted by Kingsley Leone  Time reviewed: now   Symptoms at time of EKG: now   Rhythm: normal sinus   Rate: normal  Axis: NORMAL  Ectopy: none  Conduction: normal  ST Segments/ T Waves: No ST-T wave changes  Q Waves: none  Comparison to prior: No old EKG available    Clinical Impression: normal EKG  Results for orders placed or performed during the hospital encounter of 03/28/19   CT Chest Pulmonary Embolism w Contrast   Result Value Ref Range    Radiologist flags Pulmonary embolism (AA)     Narrative    CT CHEST PULMONARY EMBOLISM WITH CONTRAST   3/28/2019 7:50 PM    HISTORY: Shortness of breath. Hypoxia.    TECHNIQUE: Pulmonary embolism protocol was performed. Isovue 370, 80mL  were injected IV. After contrast injection, volumetric helical  acquisition was performed from the thoracic inlet through the upper  abdomen. Radiation dose for this scan was reduced using automated  exposure control, adjustment of the mA and/or kV according to patient  size, or iterative reconstruction technique.    COMPARISON: None.    FINDINGS: There is extensive occlusive pulmonary embolism throughout  multiple second and third order pulmonary artery branches in the lower  lungs bilaterally. Mild prominence of the right heart suggests some  degree of associated right heart strain. The thoracic aorta is of  normal caliber, without evidence for thoracic aortic aneurysm or  dissection. No pleural or pericardial effusions. No enlarged lymph  nodes are identified in the chest. The lungs are clear. Limited views  of  the upper abdomen are unremarkable. Degenerative changes are noted  in the thoracic spine.      Impression    IMPRESSION:   1. There is extensive occlusive pulmonary embolism within multiple  pulmonary artery branches in both lower lungs.  2. Prominence of the right heart suggests some degree of right heart  strain associated with the pulmonary embolism.    [Critical Result: Pulmonary embolism]    Finding was identified on 3/28/2019 7:59 PM.     Dr. Leone was contacted by me on 3/28/2019 8:05 PM and verbalized  understanding of the critical result.     BRANDT FITZPATRICK MD   CBC with platelets differential   Result Value Ref Range    WBC 10.0 4.0 - 11.0 10e9/L    RBC Count 4.41 4.4 - 5.9 10e12/L    Hemoglobin 13.9 13.3 - 17.7 g/dL    Hematocrit 40.9 40.0 - 53.0 %    MCV 93 78 - 100 fl    MCH 31.5 26.5 - 33.0 pg    MCHC 34.0 31.5 - 36.5 g/dL    RDW 12.3 10.0 - 15.0 %    Platelet Count 170 150 - 450 10e9/L    Diff Method Automated Method     % Neutrophils 77.2 %    % Lymphocytes 16.3 %    % Monocytes 5.2 %    % Eosinophils 0.6 %    % Basophils 0.5 %    % Immature Granulocytes 0.2 %    Nucleated RBCs 0 0 /100    Absolute Neutrophil 7.7 1.6 - 8.3 10e9/L    Absolute Lymphocytes 1.6 0.8 - 5.3 10e9/L    Absolute Monocytes 0.5 0.0 - 1.3 10e9/L    Absolute Basophils 0.1 0.0 - 0.2 10e9/L    Abs Immature Granulocytes 0.0 0 - 0.4 10e9/L    Absolute Nucleated RBC 0.0    Comprehensive metabolic panel   Result Value Ref Range    Sodium 139 133 - 144 mmol/L    Potassium 3.6 3.4 - 5.3 mmol/L    Chloride 102 94 - 109 mmol/L    Carbon Dioxide 28 20 - 32 mmol/L    Anion Gap 9 3 - 14 mmol/L    Glucose 141 (H) 70 - 99 mg/dL    Urea Nitrogen 18 7 - 30 mg/dL    Creatinine 0.88 0.66 - 1.25 mg/dL    GFR Estimate >90 >60 mL/min/[1.73_m2]    GFR Estimate If Black >90 >60 mL/min/[1.73_m2]    Calcium 8.8 8.5 - 10.1 mg/dL    Bilirubin Total 0.6 0.2 - 1.3 mg/dL    Albumin 3.5 3.4 - 5.0 g/dL    Protein Total 7.2 6.8 - 8.8 g/dL    Alkaline  Phosphatase 69 40 - 150 U/L    ALT 41 0 - 70 U/L    AST 26 0 - 45 U/L   Blood gas arterial   Result Value Ref Range    pH Arterial 7.49 (H) 7.35 - 7.45 pH    pCO2 Arterial 35 35 - 45 mm Hg    pO2 Arterial 89 80 - 105 mm Hg    Bicarbonate Arterial 27 21 - 28 mmol/L    Base Excess Art 3.6 mmol/L    FIO2 28    Nt probnp inpatient   Result Value Ref Range    N-Terminal Pro BNP Inpatient 395 0 - 900 pg/mL   Troponin I   Result Value Ref Range    Troponin I ES 0.108 (H) 0.000 - 0.045 ug/L     Medications   enoxaparin (LOVENOX) injection 160 mg (not administered)   sodium chloride (PF) 0.9% PF flush 100 mL (70 mLs Intracatheter Given 3/28/19 1936)   sodium chloride (PF) 0.9% PF flush 3 mL (10 mLs Intravenous Given 3/28/19 1935)   iopamidol (ISOVUE-370) solution 500 mL (80 mLs Intravenous Given 3/28/19 1936)     Labs are reviewed and patient's troponin was slightly elevated at 0.108.  Patient continues to deny any type of chest pain.  EKG also was unremarkable.  CT scan did show bilateral pulmonary emboli.  We will start the patient on Lovenox.  Discussed the case with the hospitalist and because of the elevated troponin, we will plan on an observation stay here overnight.  Patient will need to get troponins trended and possibly consider an echocardiogram in the morning.    Assessments & Plan (with Medical Decision Making)  Pulmonary embolism     I have reviewed the nursing notes.    I have reviewed the findings, diagnosis, plan and need for follow up with the patient.              3/28/2019   [facility] EMERGENCY DEPARTMENT     Kingsley Leone MD  03/28/19 2045

## 2019-03-29 VITALS
BODY MASS INDEX: 44.1 KG/M2 | OXYGEN SATURATION: 95 % | TEMPERATURE: 98.3 F | RESPIRATION RATE: 20 BRPM | HEART RATE: 96 BPM | DIASTOLIC BLOOD PRESSURE: 71 MMHG | SYSTOLIC BLOOD PRESSURE: 119 MMHG | WEIGHT: 315 LBS | HEIGHT: 71 IN

## 2019-03-29 PROBLEM — I26.99 PULMONARY EMBOLI (H): Status: ACTIVE | Noted: 2019-03-29

## 2019-03-29 LAB
CREAT SERPL-MCNC: 0.92 MG/DL (ref 0.66–1.25)
GFR SERPL CREATININE-BSD FRML MDRD: >90 ML/MIN/{1.73_M2}
TROPONIN I SERPL-MCNC: 0.06 UG/L (ref 0–0.04)
TROPONIN I SERPL-MCNC: 0.07 UG/L (ref 0–0.04)

## 2019-03-29 PROCEDURE — 82565 ASSAY OF CREATININE: CPT | Performed by: FAMILY MEDICINE

## 2019-03-29 PROCEDURE — 99217 ZZC OBSERVATION CARE DISCHARGE: CPT | Performed by: PEDIATRICS

## 2019-03-29 PROCEDURE — 36415 COLL VENOUS BLD VENIPUNCTURE: CPT | Performed by: FAMILY MEDICINE

## 2019-03-29 PROCEDURE — 25000132 ZZH RX MED GY IP 250 OP 250 PS 637: Performed by: FAMILY MEDICINE

## 2019-03-29 PROCEDURE — 84484 ASSAY OF TROPONIN QUANT: CPT | Performed by: FAMILY MEDICINE

## 2019-03-29 PROCEDURE — G0378 HOSPITAL OBSERVATION PER HR: HCPCS

## 2019-03-29 PROCEDURE — 99207 ZZC APP CREDIT; MD BILLING SHARED VISIT: CPT | Performed by: NURSE PRACTITIONER

## 2019-03-29 RX ORDER — BUPROPION HYDROCHLORIDE 150 MG/1
150 TABLET ORAL DAILY
Status: DISCONTINUED | OUTPATIENT
Start: 2019-03-29 | End: 2019-03-29 | Stop reason: HOSPADM

## 2019-03-29 RX ORDER — CITALOPRAM HYDROBROMIDE 20 MG/1
40 TABLET ORAL DAILY
Status: DISCONTINUED | OUTPATIENT
Start: 2019-03-29 | End: 2019-03-29 | Stop reason: HOSPADM

## 2019-03-29 RX ORDER — ACETAMINOPHEN 325 MG/1
975 TABLET ORAL EVERY 8 HOURS PRN
Status: DISCONTINUED | OUTPATIENT
Start: 2019-03-29 | End: 2019-03-29

## 2019-03-29 RX ORDER — ONDANSETRON 4 MG/1
4 TABLET, ORALLY DISINTEGRATING ORAL EVERY 6 HOURS PRN
Status: DISCONTINUED | OUTPATIENT
Start: 2019-03-29 | End: 2019-03-29 | Stop reason: HOSPADM

## 2019-03-29 RX ORDER — ATENOLOL 25 MG/1
100 TABLET ORAL DAILY
Status: DISCONTINUED | OUTPATIENT
Start: 2019-03-29 | End: 2019-03-29 | Stop reason: HOSPADM

## 2019-03-29 RX ORDER — ONDANSETRON 2 MG/ML
4 INJECTION INTRAMUSCULAR; INTRAVENOUS EVERY 6 HOURS PRN
Status: DISCONTINUED | OUTPATIENT
Start: 2019-03-29 | End: 2019-03-29 | Stop reason: HOSPADM

## 2019-03-29 RX ORDER — ACETAMINOPHEN 325 MG/1
650 TABLET ORAL EVERY 4 HOURS PRN
Status: DISCONTINUED | OUTPATIENT
Start: 2019-03-29 | End: 2019-03-29 | Stop reason: HOSPADM

## 2019-03-29 RX ORDER — HYDROCHLOROTHIAZIDE 25 MG/1
25 TABLET ORAL DAILY
Status: DISCONTINUED | OUTPATIENT
Start: 2019-03-29 | End: 2019-03-29 | Stop reason: HOSPADM

## 2019-03-29 RX ADMIN — HYDROCHLOROTHIAZIDE 25 MG: 25 TABLET ORAL at 08:20

## 2019-03-29 RX ADMIN — APIXABAN 10 MG: 5 TABLET, FILM COATED ORAL at 08:21

## 2019-03-29 RX ADMIN — BUPROPION HYDROCHLORIDE 150 MG: 150 TABLET, FILM COATED, EXTENDED RELEASE ORAL at 09:36

## 2019-03-29 RX ADMIN — CITALOPRAM HYDROBROMIDE 40 MG: 20 TABLET ORAL at 08:20

## 2019-03-29 RX ADMIN — ATENOLOL 100 MG: 25 TABLET ORAL at 08:19

## 2019-03-29 ASSESSMENT — MIFFLIN-ST. JEOR: SCORE: 2538.13

## 2019-03-29 NOTE — ED NOTES
Patient currently denies chest pain, shortness of breath, dizziness.  Patient resting comfortably in bed.

## 2019-03-29 NOTE — ASSESSMENT & PLAN NOTE
Surgically repaired last week, currently a posterior splint and unable to bear weight  Continue nonweightbearing status has follow-up appointment with orthopedics next week

## 2019-03-29 NOTE — DISCHARGE SUMMARY
[facility]  Hospitalist Discharge Summary       Date of Admission:  3/28/2019  Date of Discharge:  3/29/2019  Discharging Provider: Artem Jamil NP      Discharge Diagnoses   Principal Problem:    Acute pulmonary embolism (H)  Active Problems:    Episode of recurrent major depressive disorder, unspecified depression episode severity (H)    Essential hypertension    Morbid obesity (H)    Closed fracture of posterior malleolus of left tibia with routine healing    Pulmonary emboli (H)      Follow-ups Needed After Discharge   Follow-up Appointments     Follow-up and recommended labs and tests       Follow up with primary care provider, Lissa Gamez, within 30 days, to   evaluate medication change and for hospital follow- up. No follow up labs   or test are needed.             Unresulted Labs Ordered in the Past 30 Days of this Admission     No orders found for last 61 day(s).          Discharge Disposition   Discharged to home  Condition at discharge: Stable    Hospital Course      Delbert Dowling is a 53 year old male admitted on 3/28/2019. He presented to the emergency room with worsening shortness of breath which started earlier that morning.  Patient stated that he had difficulty catching his breath and noted this worsened with any type of exertion. Of note, the patient underwent surgery with ORIF of a left ankle fracture the week prior to current admission and patient is currently in a splint and nonweightbearing on that leg.  No previous history of clots, lung problems or heart problems.  In the emergency department,  he was initially hypoxemic at 86% on room air.  Lab work was unremarkable with pulmonary CT showing bilateral lower lobe pulmonary embolism.  Troponin mildly elevated at 0.108.  Patient was given an initial dose of Lovenox. Patient was registered observation and monitored for hypoxemia. Options for anticoagulation were discussed and  Patient started on Eliquis on the  day of discharge. Patient troponin levels remain elevated likely due to pulmonary embolism but are trending down. Patient denies shortness of breath on the day of discharge and he is maintaining oxygen saturations above 90% on room air. Patient hemodynamically and medically stable for discharge today.    Acute pulmonary embolism (H)  Assessment: Patient presented with worsening shortness of breath and history of ankle surgery last week, immobilized, not able to bear weight on that ankle. Pulmonary CT showed bilateral lower lobe pulmonary emboli. Patient denies shortness of breath on the day of discharge and is maintaining oxygen saturations above 90% on room air. Patient hemodynamically stable. Troponins remain elevated but trending downward.   Plan: Patient medically stable to discharge home today. Started Eliquis 10 mg twice per day for 7 days then transition to 5 mg twice daily to complete three month course. Prescription written for 7 days of 10 mg dosing and 30 days of 5 mg dosing. Patient will need to follow up with PCP within 30 days for hospital follow up and to have prescription written for remainder of 3 month course of Eliquis.     Essential hypertension  Assessment: Chronic and stable on home dose of Tenormin and hydrochlorothiazide  Plan: Home dose of Tenormin and hydrochlorothiazide were ordered while patient in hospital and ordered to continue after discharge.     Closed fracture of posterior malleolus of left tibia with routine healing  Assessment: Surgically repaired last week, currently a posterior splint and unable to bear weight  Plan: Continue nonweightbearing status has follow-up appointment with orthopedics next week    Morbid obesity (H)  Assessment: Noted, certainly puts him at higher risk for clots.   Plan: Encouraged weight reduction as outpatient    Episode of recurrent major depressive disorder, unspecified depression episode severity (H)  Assessment: Chronic and stable on home dose of  Wellbutrin and Celexa  Plan: Home dose of Wellbutrin and Celexa were ordered while patient in hospital and ordered to continue after discharge.     Consultations This Hospital Stay   None    Code Status   Full Code    Time Spent on this Encounter   I, Artem Taylorusen, personally saw the patient today and spent greater than 30 minutes discharging this patient.       Artem Jamil NP  [facility]  ______________________________________________________________________    Physical Exam   Vital Signs: Temp: 98.3  F (36.8  C) Temp src: Oral BP: 119/71 Pulse: 96 Heart Rate: 77 Resp: 20 SpO2: 95 % O2 Device: None (Room air) Oxygen Delivery: 2 LPM  Weight: 367 lbs 4.58 oz  Constitutional: awake, alert, cooperative, no apparent distress, and appears stated age  Respiratory: No increased work of breathing, good air exchange, clear to auscultation bilaterally, no crackles or wheezing  Cardiovascular: regular rate and rhythm, normal S1 and S2 and no murmur noted  GI: normal bowel sounds, non-distended and non-tender  Musculoskeletal: left lower leg in cast and wrapped with Ace bandage; no focal calf tenderness appreciated;   Neurologic: Awake, alert, oriented to name, place and time.  Cranial nerves II-XII are grossly intact.  Motor is 5 out of 5 bilaterally.      Primary Care Physician   Lissa Gamez    Immunizations       Discharge Orders      Reason for your hospital stay    You were in the hospital for shortness of breath related to a blood clot in your lungs and improved     Follow-up and recommended labs and tests     Follow up with primary care provider, Lissa Gamez, within 30 days, to evaluate medication change and for hospital follow- up. No follow up labs or test are needed.     Activity    Your activity upon discharge: activity as tolerated     Diet    Follow this diet upon discharge: Orders Placed This Encounter      Regular Diet Adult       Significant Results and Procedures   Most  Recent 3 CBC's:  Recent Labs   Lab Test 03/28/19  1858 03/21/19  1138   WBC 10.0 7.9   HGB 13.9 14.5   MCV 93 94    195     Most Recent 3 BMP's:  Recent Labs   Lab Test 03/29/19  0621 03/28/19  1858 03/21/19  1138 05/01/18  0832   NA  --  139 139 140   POTASSIUM  --  3.6 4.2 3.9   CHLORIDE  --  102 103 105   CO2  --  28 28 30   BUN  --  18 15 16   CR 0.92 0.88 0.92 0.83   ANIONGAP  --  9 8 5   ANTHONY  --  8.8 8.9 9.0   GLC  --  141* 103* 114*     Most Recent 3 Troponin's:  Recent Labs   Lab Test 03/29/19  0621 03/29/19  0106 03/28/19 1858   TROPI 0.058* 0.069* 0.108*       Discharge Medications   Current Discharge Medication List      START taking these medications    Details   !! apixaban ANTICOAGULANT (ELIQUIS) 5 MG tablet Take 2 tablets (10 mg) by mouth 2 times daily  Qty: 13 tablet, Refills: 0    Associated Diagnoses: Other acute pulmonary embolism without acute cor pulmonale (H)      !! apixaban ANTICOAGULANT (ELIQUIS) 5 MG tablet Take 1 tablet (5 mg) by mouth 2 times daily  Qty: 60 tablet, Refills: 0    Associated Diagnoses: Other acute pulmonary embolism without acute cor pulmonale (H)       !! - Potential duplicate medications found. Please discuss with provider.      CONTINUE these medications which have NOT CHANGED    Details   acetaminophen (TYLENOL) 325 MG tablet Take 3 tablets (975 mg) by mouth every 8 hours as needed for mild pain  Qty: 50 tablet, Refills: 1    Associated Diagnoses: Closed fracture of posterior malleolus of left tibia, initial encounter; Closed displaced fracture of lateral malleolus of left fibula, initial encounter      atenolol (TENORMIN) 100 MG tablet Take 1 tablet (100 mg) by mouth daily  Qty: 90 tablet, Refills: 0    Associated Diagnoses: Essential hypertension      buPROPion (WELLBUTRIN XL) 150 MG 24 hr tablet Take 1 tablet (150 mg) by mouth daily  Qty: 90 tablet, Refills: 0    Associated Diagnoses: Episode of recurrent major depressive disorder, unspecified depression  episode severity (H)      citalopram (CELEXA) 40 MG tablet Take 1 tablet (40 mg) by mouth daily  Qty: 90 tablet, Refills: 0    Associated Diagnoses: Episode of recurrent major depressive disorder, unspecified depression episode severity (H)      fluconazole (DIFLUCAN) 200 MG tablet Take 1 tablet (200 mg) by mouth daily for 14 days Contact your primary care provider if not improving.  Qty: 14 tablet, Refills: 0    Associated Diagnoses: Thrush, oral      hydrochlorothiazide (HYDRODIURIL) 25 MG tablet TAKE 1 TAB BY MOUTH ONCE DAILY IN THE MORNING  Qty: 90 tablet, Refills: 0    Associated Diagnoses: Essential hypertension      Multiple Vitamins-Minerals (MULTIVITAMIN ADULT PO)       senna-docusate (SENOKOT-S/PERICOLACE) 8.6-50 MG tablet Take 1-2 tablets by mouth 2 times daily  Qty: 30 tablet, Refills: 1    Associated Diagnoses: Closed fracture of posterior malleolus of left tibia, initial encounter; Closed displaced fracture of lateral malleolus of left fibula, initial encounter      tiZANidine (ZANAFLEX) 2 MG tablet Take 1-2 tablets (2-4 mg) by mouth 3 times daily as needed for muscle spasms  Qty: 40 tablet, Refills: 1    Associated Diagnoses: Closed fracture of posterior malleolus of left tibia, initial encounter; Closed displaced fracture of lateral malleolus of left fibula, initial encounter         STOP taking these medications       aspirin (ASA) 325 MG EC tablet Comments:   Reason for Stopping:             Allergies   Allergies   Allergen Reactions     Lisinopril Cough

## 2019-03-29 NOTE — PROGRESS NOTES
S-(situation): Patient registered to Observation. Patient arrived to room 254 via wheelchair from ED    B-(background): Bilateral PE's    A-(assessment): Pt is alert and oriented. Reports shortness of breath with activity. Denies any pain. Lungs clear/diminished. Pt is on 2L nasal cannula with sats in the low to mid 90's. Hard splint on left lower extremity due to recent fall and break. Pt is to be mostly non weight bearing to that leg.    R-(recommendations): Orders and observation goals reviewed with patient

## 2019-03-29 NOTE — ASSESSMENT & PLAN NOTE
Noted, certainly puts him at higher risk for clots  Tonight there may be some element of CLAIR, monitor with pulse oximetry and oxygen supplementation as necessary

## 2019-03-29 NOTE — H&P
[facility]    History and Physical - Hospitalist Service       Date of Admission:  3/28/2019    Assessment & Plan   Delbert Dowling is a 53 year old male admitted on 3/28/2019. He presents emergency room with worsening shortness of breath since this morning.  States that she has been difficult to catch his breath and worsens with any type of exertion.  Recent history significant for surgery with ORIF of a left ankle fracture last week, currently in a splint and nonweightbearing on that leg.  Previous history of clots, lung problems or heart problems.  In the emergency department he was initially hypoxemic at 86% on room air.  Lab work is unremarkable with pulmonary CT showing bilateral lower lobe pulmonary embolism.  Troponin mildly elevated at 0.108.  Patient has been given an initial dose of Lovenox.  Patient will be registered observation and monitored for hypoxemia.  Options for anticoagulation were discussed and will plan on starting him on Eliquis tomorrow morning and if not requiring oxygen, could potentially go home tomorrow.  We will trend his troponins studies over the night and if they remain elevated, consider echocardiogram tomorrow.    Acute pulmonary embolism (H)  Presenting with worsening shortness of breath since this morning.  History of ankle surgery last week, immobilized, not able to bear weight on that ankle  Pulmonary CT showing bilateral lower lobe pulmonary emboli  Initial presentation to the ER requiring oxygen supplementation  Has been given initial dose of Lovenox.  After thorough discussion with patient discussion options for anticoagulation we will plan on starting Eliquis tomorrow morning using the standard protocol of 10 mg twice daily for 7 days, dropping to 5 mg twice daily for the next 3 months.  If not requiring oxygen tomorrow and able to get up with minimal shortness of breath would plan on discharging  Troponin was mildly elevated and will trend  these.  Consider echocardiogram tomorrow if concern for right-sided failure    Essential hypertension  Controlled at home taking Tenormin and hydrochlorothiazide  Continue home dosing    Closed fracture of posterior malleolus of left tibia with routine healing  Surgically repaired last week, currently a posterior splint and unable to bear weight  Continue nonweightbearing status has follow-up appointment with orthopedics next week    Morbid obesity (H)  Noted, certainly puts him at higher risk for clots  Tonight there may be some element of CLAIR, monitor with pulse oximetry and oxygen supplementation as necessary    Episode of recurrent major depressive disorder, unspecified depression episode severity (H)  History of issues currently taking Wellbutrin and Celexa  Continue home dosing         Diet: Regular diet  DVT Prophylaxis: Enoxaparin (Lovenox) SQ  Montilla Catheter: not present  Code Status: Full code    Disposition Plan   Expected discharge: Tomorrow, recommended to prior living arrangement once Not requiring oxygen, started on Eliquis or equivalent.  Entered: Malvin Silva MD 03/28/2019, 10:25 PM     The patient's care was discussed with the Patient and Patient's wife.    Malvin Silva MD  [facility]    ______________________________________________________________________    Chief Complaint   53-year-old male with worsening shortness of breath since this morning.     History is obtained from the patient, electronic health record, emergency department physician and patient's spouse    History of Present Illness   Delbert Dowling is a 53 year old male who presents with increasing shortness of breath since this morning. Has worsened with dyspnea with any type of exertion.  He has not had this happen before with no previous history of lung problems, clots or heart problems.  Patient had surgery to fix a left ankle fracture last week and is currently in a posterior splint,  nonweightbearing.  He has not been on any anticoagulation.  He denies fever, chills.  He denies cough, chest pain.  He denies nausea or vomiting, abdominal pain.  He said pain in the left ankle area but does not complain of calf pain.      Review of Systems    The 10 point Review of Systems is negative other than noted in the HPI or here.     Past Medical History    I have reviewed this patient's medical history and updated it with pertinent information if needed.   Past Medical History:   Diagnosis Date     Depression      History of tobacco abuse     quit 2006     HTN (hypertension)        Past Surgical History   I have reviewed this patient's surgical history and updated it with pertinent information if needed.  Past Surgical History:   Procedure Laterality Date     NO HISTORY OF SURGERY       OPEN REDUCTION INTERNAL FIXATION ANKLE Left 3/22/2019    Procedure: OPEN REDUCTION INTERNAL FIXATION LEFT ANKLE;  Surgeon: Amrit Rivera DO;  Location:  OR       Social History   I have reviewed this patient's social history and updated it with pertinent information if needed.  Social History     Tobacco Use     Smoking status: Former Smoker     Packs/day: 1.00     Years: 20.00     Pack years: 20.00     Smokeless tobacco: Never Used     Tobacco comment: quit 13 years    Substance Use Topics     Alcohol use: Yes     Comment: none      Drug use: No       Family History   I have reviewed this patient's family history and updated it with pertinent information if needed.   Family History   Problem Relation Age of Onset     Chronic Obstructive Pulmonary Disease Mother      Hypertension Father        Prior to Admission Medications   Prior to Admission Medications   Prescriptions Last Dose Informant Patient Reported? Taking?   Multiple Vitamins-Minerals (MULTIVITAMIN ADULT PO) 3/28/2019 at 0630  Yes Yes   acetaminophen (TYLENOL) 325 MG tablet Past Month at Unknown time  No Yes   Sig: Take 3 tablets (975 mg) by mouth  every 8 hours as needed for mild pain   aspirin (ASA) 325 MG EC tablet 3/28/2019 at 0630  Yes Yes   Sig: Take 325 mg by mouth every 6 hours as needed for moderate pain   atenolol (TENORMIN) 100 MG tablet 3/28/2019 at 0630  No Yes   Sig: Take 1 tablet (100 mg) by mouth daily   buPROPion (WELLBUTRIN XL) 150 MG 24 hr tablet 3/28/2019 at 0630  No Yes   Sig: Take 1 tablet (150 mg) by mouth daily   citalopram (CELEXA) 40 MG tablet 3/28/2019 at 0630  No Yes   Sig: Take 1 tablet (40 mg) by mouth daily   fluconazole (DIFLUCAN) 200 MG tablet Past Week at Unknown time  No Yes   Sig: Take 1 tablet (200 mg) by mouth daily for 14 days Contact your primary care provider if not improving.   hydrochlorothiazide (HYDRODIURIL) 25 MG tablet 3/28/2019 at 0630  No Yes   Sig: TAKE 1 TAB BY MOUTH ONCE DAILY IN THE MORNING   senna-docusate (SENOKOT-S/PERICOLACE) 8.6-50 MG tablet Past Week at Unknown time  No Yes   Sig: Take 1-2 tablets by mouth 2 times daily   tiZANidine (ZANAFLEX) 2 MG tablet Past Week at Unknown time  No Yes   Sig: Take 1-2 tablets (2-4 mg) by mouth 3 times daily as needed for muscle spasms      Facility-Administered Medications: None     Allergies   Allergies   Allergen Reactions     Lisinopril Cough       Physical Exam   Vital Signs: Temp: 99  F (37.2  C) Temp src: Oral BP: 122/87 Pulse: 88 Heart Rate: 87 Resp: 25 SpO2: 97 % O2 Device: Nasal cannula Oxygen Delivery: 1.5 LPM  Weight: 365 lbs 0 oz    Constitutional: awake, alert, cooperative, no apparent distress, and appears stated age  Eyes: Lids and lashes normal, pupils equal, round and reactive to light, extra ocular muscles intact, sclera clear, conjunctiva normal  ENT: Normocephalic, without obvious abnormality, atraumatic, sinuses nontender on palpation, external ears without lesions, oral pharynx with moist mucous membranes, tonsils without erythema or exudates, gums normal and good dentition.  Hematologic / Lymphatic: no cervical lymphadenopathy and no  supraclavicular lymphadenopathy  Respiratory: No increased work of breathing, good air exchange, clear to auscultation bilaterally, no crackles or wheezing  Cardiovascular: Normal apical impulse, regular rate and rhythm, normal S1 and S2, no S3 or S4, and no murmur noted  GI: normal bowel sounds, soft, non-distended and non-tender  Skin: no bruising or bleeding, normal skin color, texture, turgor and no redness, warmth, or swelling  Musculoskeletal: Left lower leg in a posterior splint, wrapped with Ace wrap.  No definite tenderness is palpated over the calf of the left lower leg.  Right leg is unremarkable.  Neurologic: Awake, alert, oriented to name, place and time.  Cranial nerves II-XII are grossly intact.  Motor is 5 out of 5 bilaterally.    Data   Data reviewed today: I reviewed all medications, new labs and imaging results over the last 24 hours. I personally reviewed the EKG tracing showing No acute changes, appears normal and the chest CT image(s) showing Per radiology showing bilateral pulmonary bolus him involving the lower lobes.    Results for orders placed or performed during the hospital encounter of 03/28/19   CT Chest Pulmonary Embolism w Contrast   Result Value Ref Range    Radiologist flags Pulmonary embolism (AA)     Narrative    CT CHEST PULMONARY EMBOLISM WITH CONTRAST   3/28/2019 7:50 PM    HISTORY: Shortness of breath. Hypoxia.    TECHNIQUE: Pulmonary embolism protocol was performed. Isovue 370, 80mL  were injected IV. After contrast injection, volumetric helical  acquisition was performed from the thoracic inlet through the upper  abdomen. Radiation dose for this scan was reduced using automated  exposure control, adjustment of the mA and/or kV according to patient  size, or iterative reconstruction technique.    COMPARISON: None.    FINDINGS: There is extensive occlusive pulmonary embolism throughout  multiple second and third order pulmonary artery branches in the lower  lungs bilaterally.  Mild prominence of the right heart suggests some  degree of associated right heart strain. The thoracic aorta is of  normal caliber, without evidence for thoracic aortic aneurysm or  dissection. No pleural or pericardial effusions. No enlarged lymph  nodes are identified in the chest. The lungs are clear. Limited views  of the upper abdomen are unremarkable. Degenerative changes are noted  in the thoracic spine.      Impression    IMPRESSION:   1. There is extensive occlusive pulmonary embolism within multiple  pulmonary artery branches in both lower lungs.  2. Prominence of the right heart suggests some degree of right heart  strain associated with the pulmonary embolism.    [Critical Result: Pulmonary embolism]    Finding was identified on 3/28/2019 7:59 PM.     Dr. Leone was contacted by me on 3/28/2019 8:05 PM and verbalized  understanding of the critical result.     BRANDT FITZPATRICK MD   CBC with platelets differential   Result Value Ref Range    WBC 10.0 4.0 - 11.0 10e9/L    RBC Count 4.41 4.4 - 5.9 10e12/L    Hemoglobin 13.9 13.3 - 17.7 g/dL    Hematocrit 40.9 40.0 - 53.0 %    MCV 93 78 - 100 fl    MCH 31.5 26.5 - 33.0 pg    MCHC 34.0 31.5 - 36.5 g/dL    RDW 12.3 10.0 - 15.0 %    Platelet Count 170 150 - 450 10e9/L    Diff Method Automated Method     % Neutrophils 77.2 %    % Lymphocytes 16.3 %    % Monocytes 5.2 %    % Eosinophils 0.6 %    % Basophils 0.5 %    % Immature Granulocytes 0.2 %    Nucleated RBCs 0 0 /100    Absolute Neutrophil 7.7 1.6 - 8.3 10e9/L    Absolute Lymphocytes 1.6 0.8 - 5.3 10e9/L    Absolute Monocytes 0.5 0.0 - 1.3 10e9/L    Absolute Basophils 0.1 0.0 - 0.2 10e9/L    Abs Immature Granulocytes 0.0 0 - 0.4 10e9/L    Absolute Nucleated RBC 0.0    Comprehensive metabolic panel   Result Value Ref Range    Sodium 139 133 - 144 mmol/L    Potassium 3.6 3.4 - 5.3 mmol/L    Chloride 102 94 - 109 mmol/L    Carbon Dioxide 28 20 - 32 mmol/L    Anion Gap 9 3 - 14 mmol/L    Glucose 141 (H) 70 - 99  mg/dL    Urea Nitrogen 18 7 - 30 mg/dL    Creatinine 0.88 0.66 - 1.25 mg/dL    GFR Estimate >90 >60 mL/min/[1.73_m2]    GFR Estimate If Black >90 >60 mL/min/[1.73_m2]    Calcium 8.8 8.5 - 10.1 mg/dL    Bilirubin Total 0.6 0.2 - 1.3 mg/dL    Albumin 3.5 3.4 - 5.0 g/dL    Protein Total 7.2 6.8 - 8.8 g/dL    Alkaline Phosphatase 69 40 - 150 U/L    ALT 41 0 - 70 U/L    AST 26 0 - 45 U/L   Blood gas arterial   Result Value Ref Range    pH Arterial 7.49 (H) 7.35 - 7.45 pH    pCO2 Arterial 35 35 - 45 mm Hg    pO2 Arterial 89 80 - 105 mm Hg    Bicarbonate Arterial 27 21 - 28 mmol/L    Base Excess Art 3.6 mmol/L    FIO2 28    Nt probnp inpatient   Result Value Ref Range    N-Terminal Pro BNP Inpatient 395 0 - 900 pg/mL   Troponin I   Result Value Ref Range    Troponin I ES 0.108 (H) 0.000 - 0.045 ug/L

## 2019-03-29 NOTE — PROGRESS NOTES
"SUBJECTIVE:   CC: Delbert Dowling is an 53 year old male who presents for preventative health visit.     Physical   Annual:     Getting at least 3 servings of Calcium per day:  Yes    Bi-annual eye exam:  Yes    Dental care twice a year:  Yes    Sleep apnea or symptoms of sleep apnea:  None    Diet:  Regular (no restrictions)    Frequency of exercise:  2-3 days/week    Duration of exercise:  Less than 15 minutes    Taking medications regularly:  Yes    Medication side effects:  None    Additional concerns today:  No    PHQ-2 Total Score: 0    Answers for HPI/ROS submitted by the patient on 4/5/2019   Annual Exam:  PHQ9 TOTAL SCORE: 0  MYKE 7 TOTAL SCORE: 0    ED/UC Followup:  Facility:  [facility]  Date of visit: 3/28/19-3/29/19 for acute Pulmonary Embolism, 3/30/19 for allergic reaction to Eliquis he had started after his PE.  Reason for visit: Pulmonary Embolism after left ankle ORIF surgery and allergic reaction to Eliquis.  Current Status: Still taking xarelto     Patient reports that now after all he has been through recently he is doing relatively well. He notes that his mood is better this week, but he had been short and grumpy with people the last few weeks. He was just tired of all the \"cover your ass\" speeches he was getting at the hospital.    His allergic reaction to Eliquis was that his hands and face became swollen.     He has 1 wheel chairs at home, that was his mother's, that he uses to ambulate and a friend at work has one that she has lent him while at work. He is keeping his left foot elevated while he is in his wheel chair or on the couch    Patient states that he did get a call form the sleep specialist for a sleep study and told then that he would do it \"once all this is done.\"    He denies any chest pain, leg pain, constipation, allergic reactions to his Xarelto, or shortness or breath.      Today's PHQ-2 Score:   PHQ-2 ( 1999 Pfizer) 4/5/2019   Q1: Little interest or pleasure in doing things 0 "   Q2: Feeling down, depressed or hopeless 0   PHQ-2 Score 0   Q1: Little interest or pleasure in doing things Not at all   Q2: Feeling down, depressed or hopeless Not at all   PHQ-2 Score 0     Abuse: Current or Past(Physical, Sexual or Emotional)- No  Do you feel safe in your environment? Yes    Social History     Tobacco Use     Smoking status: Former Smoker     Packs/day: 1.00     Years: 20.00     Pack years: 20.00     Smokeless tobacco: Never Used     Tobacco comment: quit 13 years    Substance Use Topics     Alcohol use: Yes     Comment: none      Alcohol Use 4/5/2019   If you drink alcohol do you typically have greater than 3 drinks per day OR greater than 7 drinks per week? No   No flowsheet data found.    Last PSA:   PSA   Date Value Ref Range Status   05/01/2018 0.14 0 - 4 ug/L Final     Comment:     Assay Method:  Chemiluminescence using Siemens Vista analyzer     Reviewed orders with patient. Reviewed health maintenance and updated orders accordingly - Yes  BP Readings from Last 3 Encounters:   04/05/19 124/84   03/30/19 128/84   03/29/19 119/71    Wt Readings from Last 3 Encounters:   04/05/19 (!) 165 kg (363 lb 12.8 oz)   04/03/19 (!) 165.6 kg (365 lb)   03/30/19 (!) 165.6 kg (365 lb)        Patient Active Problem List   Diagnosis     Episode of recurrent major depressive disorder, unspecified depression episode severity (H)     Essential hypertension     Morbid obesity (H)     Snoring     Closed fracture of left tibia and fibula, initial encounter     Closed fracture of posterior malleolus of left tibia with routine healing     Closed displaced fracture of lateral malleolus of left fibula, initial encounter     Depression, major     Disorder of nasal sinus     Skin tag     Acute pulmonary embolism (H)     Pulmonary emboli (H)     S/P ORIF (open reduction internal fixation) fracture     Past Surgical History:   Procedure Laterality Date     OPEN REDUCTION INTERNAL FIXATION ANKLE Left 3/22/2019     Procedure: OPEN REDUCTION INTERNAL FIXATION LEFT ANKLE;  Surgeon: Amrit Rivera DO;  Location: [facility] OR       Social History     Tobacco Use     Smoking status: Former Smoker     Packs/day: 1.00     Years: 20.00     Pack years: 20.00     Smokeless tobacco: Never Used     Tobacco comment: quit 13 years    Substance Use Topics     Alcohol use: Yes     Comment: none      Family History   Problem Relation Age of Onset     Chronic Obstructive Pulmonary Disease Mother      Hypertension Father          Current Outpatient Medications   Medication Sig Dispense Refill     atenolol (TENORMIN) 100 MG tablet Take 1 tablet (100 mg) by mouth daily 90 tablet 0     buPROPion (WELLBUTRIN XL) 150 MG 24 hr tablet Take 1 tablet (150 mg) by mouth daily 90 tablet 0     citalopram (CELEXA) 40 MG tablet Take 1 tablet (40 mg) by mouth daily 90 tablet 0     hydrochlorothiazide (HYDRODIURIL) 25 MG tablet TAKE 1 TAB BY MOUTH ONCE DAILY IN THE MORNING 90 tablet 0     Multiple Vitamins-Minerals (MULTIVITAMIN ADULT PO)        rivaroxaban ANTICOAGULANT (XARELTO) 15 MG TABS tablet Take 1 tablet (15 mg) by mouth 2 times daily (with meals) 138 tablet 0     acetaminophen (TYLENOL) 325 MG tablet Take 3 tablets (975 mg) by mouth every 8 hours as needed for mild pain (Patient not taking: Reported on 4/3/2019) 50 tablet 1     senna-docusate (SENOKOT-S/PERICOLACE) 8.6-50 MG tablet Take 1-2 tablets by mouth 2 times daily (Patient not taking: Reported on 4/3/2019) 30 tablet 1     tiZANidine (ZANAFLEX) 2 MG tablet Take 1-2 tablets (2-4 mg) by mouth 3 times daily as needed for muscle spasms (Patient not taking: Reported on 4/3/2019) 40 tablet 1     Allergies   Allergen Reactions     Eliquis [Apixaban] Hives and Swelling     Lisinopril Cough     Recent Labs   Lab Test 03/29/19  0621 03/28/19  1858 03/21/19  1138 05/01/18  0832 04/18/17  03/17/15   A1C  --   --  5.5  --   --   --   --    LDL  --   --  72 63 67  --  73   HDL  --   --   --  47 42  --  40    TRIG  --   --   --  127 114  --  130   ALT  --  41 30  --   --   --   --    CR 0.92 0.88 0.92 0.83  --    < > 0.89   GFRESTIMATED >90 >90 >90 >90  --    < > >60   GFRESTBLACK >90 >90 >90 >90  --   --  >60   POTASSIUM  --  3.6 4.2 3.9  --    < > 4.4    < > = values in this interval not displayed.      Reviewed and updated as needed this visit by clinical staff  Tobacco  Allergies  Meds  Med Hx  Surg Hx  Fam Hx  Soc Hx      Reviewed and updated as needed this visit by Provider        Past Medical History:   Diagnosis Date     Depression      History of tobacco abuse     quit 2006     HTN (hypertension)       Past Surgical History:   Procedure Laterality Date     OPEN REDUCTION INTERNAL FIXATION ANKLE Left 3/22/2019    Procedure: OPEN REDUCTION INTERNAL FIXATION LEFT ANKLE;  Surgeon: Amrit Rivera DO;  Location: [facility] OR     Review of Systems   Constitutional: Negative for chills and fever.   HENT: Negative for congestion, ear pain, hearing loss and sore throat.    Eyes: Negative for pain and visual disturbance.   Respiratory: Negative for cough and shortness of breath.    Cardiovascular: Negative for chest pain, palpitations and peripheral edema.   Gastrointestinal: Negative for abdominal pain, constipation, diarrhea, heartburn, hematochezia and nausea.   Genitourinary: Negative for discharge, dysuria, frequency, genital sores, hematuria, impotence and urgency.   Musculoskeletal: Negative for arthralgias, joint swelling and myalgias.   Skin: Negative for rash.   Neurological: Negative for dizziness, weakness, headaches and paresthesias.   Psychiatric/Behavioral: Negative for mood changes. The patient is not nervous/anxious.      This document serves as a record of the services and decisions personally performed and made by Lissa Gamez CNP. It was created on his/her behalf by Vanessa Benavidez, trained medical scribe. The creation of this document is based the provider's statements to the medical  leonard.    Abdelrahman Benavidez 1:02 PM, April 5, 2019  OBJECTIVE:   /84   Pulse 64   Temp 98.1  F (36.7  C) (Temporal)   Resp 18   Ht 1.829 m (6')   Wt (!) 165 kg (363 lb 12.8 oz)   SpO2 98%   BMI 49.34 kg/m      Wt Readings from Last 5 Encounters:   04/05/19 (!) 165 kg (363 lb 12.8 oz)   04/03/19 (!) 165.6 kg (365 lb)   03/30/19 (!) 165.6 kg (365 lb)   03/29/19 (!) 166.6 kg (367 lb 4.6 oz)   03/20/19 (!) 167.8 kg (370 lb)      Physical Exam  GENERAL: healthy, alert and no distress, in wheel chair  EYES: Eyes grossly normal to inspection, PERRL and conjunctivae and sclerae normal  HENT: ear canals and TM's normal, nose and mouth without ulcers or lesions  NECK: no adenopathy, no asymmetry, masses, or scars and thyroid normal to palpation  RESP: lungs clear to auscultation - no rales, rhonchi or wheezes  CV: regular rate and rhythm, normal S1 S2, no S3 or S4, no murmur, click or rub, no peripheral edema and peripheral pulses strong  ABDOMEN: soft, nontender, obese,  MS: left lower leg in walking boot cast, no edema in right lower leg  SKIN: no suspicious lesions or rashes  NEURO: Normal strength and tone, mentation intact and speech normal  PSYCH: mentation appears normal, affect normal/bright    Diagnostic Test Results:  No results found for this or any previous visit (from the past 24 hour(s)).    ASSESSMENT/PLAN:       ICD-10-CM    1. Encounter for routine adult medical exam with abnormal findings Z00.01 Lipid panel reflex to direct LDL Fasting     Prostate spec antigen screen   2. Other acute pulmonary embolism without acute cor pulmonale (H) I26.99 rivaroxaban ANTICOAGULANT (XARELTO) 15 MG TABS tablet   3. Essential hypertension I10 **Basic metabolic panel FUTURE 6mo     Discussed weight management, mood, hypertension, recent hospital admissions and discharge summaries, healthy diet, and regular exercise at length with patient today.     Patient reports caron medications are well tolerated with no  reported side effects. Plan to continue on current doses; Refills provided.     Follow up with PCP in 4 months for medication maintenance and lab work or prn.       COUNSELING:   Reviewed preventive health counseling, as reflected in patient instructions       Regular exercise       Healthy diet/nutrition       Safe sex practices/STD prevention       HIV screeninx in teen years, 1x in adult years, and at intervals if high risk       Colon cancer screening       Prostate cancer screening       One time pneumovax for smokers       Advance Care Planning    BP Readings from Last 1 Encounters:   19 124/84     Estimated body mass index is 49.34 kg/m  as calculated from the following:    Height as of this encounter: 1.829 m (6').    Weight as of this encounter: 165 kg (363 lb 12.8 oz).    Weight management plan: Discussed healthy diet and exercise guidelines     reports that he has quit smoking. He has a 20.00 pack-year smoking history. he has never used smokeless tobacco.    Counseling Resources:  ATP IV Guidelines  Pooled Cohorts Equation Calculator  FRAX Risk Assessment  ICSI Preventive Guidelines  Dietary Guidelines for Americans,   USDA's MyPlate  ASA Prophylaxis  Lung CA Screening    The information in this document, created by the medical scribe for me, accurately reflects the services I personally performed and the decisions made by me. I have reviewed and approved this document for accuracy prior to leaving the patient care area.  Lissa Gamez CNP  1:02 PM, 19    NANDO Ellison CNP  [facility]

## 2019-03-29 NOTE — PROGRESS NOTES
S-(situation): Patient discharged to Home via W/C with wife    B-(background): Observation goals met Goals were met.    A-(assessment): VSS, denies pain.  Maintaining sats well on room air.  Education done on new med.  Pt is ready for discharge.    R-(recommendations): Discharge instructions reviewed with pt. Listed belongings gathered and returned to patient.sent with pt.  Patient Education resolved: Yes  New medications-Pt. Has been educated about reason of use and side effects Yes  Home and hospital acquired medications returned to patient NA  Medication Bin checked and emptied on discharge Yes

## 2019-03-29 NOTE — ASSESSMENT & PLAN NOTE
Presenting with worsening shortness of breath since this morning.  History of ankle surgery last week, immobilized, not able to bear weight on that ankle  Pulmonary CT showing bilateral lower lobe pulmonary emboli  Initial presentation to the ER requiring oxygen supplementation  Has been given initial dose of Lovenox.  After thorough discussion with patient discussion options for anticoagulation we will plan on starting Eliquis tomorrow morning using the standard protocol of 10 mg twice daily for 7 days, dropping to 5 mg twice daily for the next 3 months.  If not requiring oxygen tomorrow and able to get up with minimal shortness of breath would plan on discharging  Troponin was mildly elevated and will trend these.  Consider echocardiogram tomorrow if concern for right-sided failure

## 2019-03-29 NOTE — PLAN OF CARE
Patient remains on 2L nasal cannula while asleep to maintain sats greater than 92%. He denies any pain. He does become short of breath with activity. Vitals have been stable. Troponins trending down. Pt will begin Eliquis today after receiving Lovenox last evening.

## 2019-03-29 NOTE — PROGRESS NOTES
SPIRITUAL HEALTH SERVICES  SPIRITUAL ASSESSMENT Progress Note  [facility]      During Rounding,  introduced himself to Delbert Dowling and informed him of his availability.    Onel Moctezuma M.Div., [facility]  Staff   Office tel: [phone]

## 2019-03-29 NOTE — ED NOTES
ED Nursing criteria listed below was addressed during verbal handoff:     Abnormal vitals: Yes  Abnormal results: Yes  Med Reconciliation completed: Yes  Meds given in ED: Yes  Any Overdue Meds: Yes  Core Measures: Yes  Isolation: No  Special needs: Yes  Skin assessment: Yes    Observation Patient  Education provided: Yes

## 2019-03-30 ENCOUNTER — NURSE TRIAGE (OUTPATIENT)
Dept: NURSING | Facility: CLINIC | Age: 54
End: 2019-03-30

## 2019-03-30 ENCOUNTER — HOSPITAL ENCOUNTER (EMERGENCY)
Facility: CLINIC | Age: 54
Discharge: HOME | End: 2019-03-30
Attending: NURSE PRACTITIONER | Admitting: NURSE PRACTITIONER
Payer: COMMERCIAL

## 2019-03-30 VITALS
HEIGHT: 72 IN | OXYGEN SATURATION: 96 % | RESPIRATION RATE: 16 BRPM | TEMPERATURE: 99 F | SYSTOLIC BLOOD PRESSURE: 128 MMHG | DIASTOLIC BLOOD PRESSURE: 84 MMHG | WEIGHT: 315 LBS | BODY MASS INDEX: 42.66 KG/M2 | HEART RATE: 76 BPM

## 2019-03-30 DIAGNOSIS — Z88.9 ALLERGY TO DRUG: ICD-10-CM

## 2019-03-30 PROCEDURE — 99284 EMERGENCY DEPT VISIT MOD MDM: CPT | Mod: Z6 | Performed by: NURSE PRACTITIONER

## 2019-03-30 PROCEDURE — 25000132 ZZH RX MED GY IP 250 OP 250 PS 637: Performed by: NURSE PRACTITIONER

## 2019-03-30 PROCEDURE — 99283 EMERGENCY DEPT VISIT LOW MDM: CPT | Performed by: NURSE PRACTITIONER

## 2019-03-30 RX ORDER — CETIRIZINE HYDROCHLORIDE 10 MG/1
10 TABLET ORAL DAILY
Qty: 5 TABLET | Refills: 0 | Status: SHIPPED | OUTPATIENT
Start: 2019-03-30 | End: 2019-04-05

## 2019-03-30 RX ADMIN — RANITIDINE 150 MG: 150 TABLET ORAL at 22:13

## 2019-03-30 ASSESSMENT — MIFFLIN-ST. JEOR: SCORE: 2543.63

## 2019-03-30 NOTE — TELEPHONE ENCOUNTER
Swelling on upper lip and a few rash spots.  Will see provider within four hours per protocol.  Nata Arthur RN  [facility]      Reason for Disposition    Swelling began after taking a drug    Additional Information    Negative: [1] Life-threatening reaction (anaphylaxis) in the past to similar substance (e.g., food, insect bite/sting, chemical, etc.) AND [2] < 2 hours since exposure    Negative: Unresponsive, passed out or very weak    Negative: Swollen tongue    Negative: Difficulty breathing or wheezing    Negative: Sounds like a life-threatening emergency to the triager    Negative: [1] SEVERE swelling of entire face AND [2] < 2 hours since exposure to high-risk allergen (e.g., peanuts, tree nuts, fish, shellfish or 1st dose of drug) AND [3] no serious symptoms AND [4] no serious allergic reaction in the past    Negative: Fever    Negative: Taking an ACE Inhibitor medication  (e.g., benazepril/LOTENSIN, captopril/CAPOTEN, enalapril/VASOTEC, lisinopril/ZESTRIL)    Negative: Patient sounds very sick or weak to the triager    Negative: Pregnant > 20 weeks    Negative: Postpartum (i.e. < 1 month since delivery)    Negative: SEVERE swelling of the entire face    Negative: [1] Swelling is red AND [2] very painful to touch    Protocols used: FACE SWELLING-ADULT-

## 2019-03-30 NOTE — ED AVS SNAPSHOT
[facility] Emergency Department  [address] [place] [address]  Phone:  [phone]  Fax:  [phone]                                    Delbert Dowling   MRN: [id number]    Department:  [facility] Emergency Department   Date of Visit:  3/30/2019           After Visit Summary Signature Page    I have received my discharge instructions, and my questions have been answered. I have discussed any challenges I see with this plan with the nurse or doctor.    ..........................................................................................................................................  Patient/Patient Representative Signature      ..........................................................................................................................................  Patient Representative Print Name and Relationship to Patient    ..................................................               ................................................  Date                                   Time    ..........................................................................................................................................  Reviewed by Signature/Title    ...................................................              ..............................................  Date                                               Time          22EPIC Rev 08/18

## 2019-03-31 ASSESSMENT — ENCOUNTER SYMPTOMS: FACIAL SWELLING: 1

## 2019-03-31 NOTE — ED PROVIDER NOTES
History     Chief Complaint   Patient presents with     Medication Reaction     HPI  Delbert Dowling is a 53 year old male who presents to the emergency department today with an allergic to reaction to likely his Eliquis which she just started yesterday for PE.  Patient had ORIF repair of his left ankle on 22 March, presented to the emergency room 2 days ago with shortness of breath and was diagnosed with acute pulmonary embolism and started on Eliquis.  Patient was discharged home yesterday.  Patient took both of his doses today although after his dose this morning he developed itching, hives and lip swelling as well as eyelid swelling which nearly resolved with Benadryl.  Patient knows that he needs the blood thinner and decided to take his 7 PM dose despite his symptoms.  Patient presents here with mild lip swelling, with small abdominal hives but denies any shortness of breath or trouble swallowing.    Allergies:  Allergies   Allergen Reactions     Eliquis [Apixaban] Hives and Swelling     Lisinopril Cough       Problem List:    Patient Active Problem List    Diagnosis Date Noted     Pulmonary emboli (H) 03/29/2019     Priority: Medium     Depression, major 03/28/2019     Priority: Medium     Disorder of nasal sinus 03/28/2019     Priority: Medium     Overview:   Testing showed decongestants caused too much drying and secondary infections, since stopping that med the sinus sx have been less.       Skin tag 03/28/2019     Priority: Medium     Overview:   Neck area       Acute pulmonary embolism (H) 03/28/2019     Priority: Medium     Closed fracture of posterior malleolus of left tibia with routine healing 03/20/2019     Priority: Medium     Closed displaced fracture of lateral malleolus of left fibula, initial encounter 03/20/2019     Priority: Medium     Closed fracture of left tibia and fibula, initial encounter 03/13/2019     Priority: Medium     Morbid obesity (H) 05/02/2018     Priority: Medium     Snoring  05/02/2018     Priority: Medium     Episode of recurrent major depressive disorder, unspecified depression episode severity (H) 05/01/2018     Priority: Medium     Essential hypertension 05/01/2018     Priority: Medium        Past Medical History:    Past Medical History:   Diagnosis Date     Depression      History of tobacco abuse      HTN (hypertension)        Past Surgical History:    Past Surgical History:   Procedure Laterality Date     NO HISTORY OF SURGERY       OPEN REDUCTION INTERNAL FIXATION ANKLE Left 3/22/2019    Procedure: OPEN REDUCTION INTERNAL FIXATION LEFT ANKLE;  Surgeon: Amrit Rivera DO;  Location: [facility] OR       Family History:    Family History   Problem Relation Age of Onset     Chronic Obstructive Pulmonary Disease Mother      Hypertension Father        Social History:  Marital Status:  Single [1]  Social History     Tobacco Use     Smoking status: Former Smoker     Packs/day: 1.00     Years: 20.00     Pack years: 20.00     Smokeless tobacco: Never Used     Tobacco comment: quit 13 years    Substance Use Topics     Alcohol use: Yes     Comment: none      Drug use: No        Medications:      cetirizine (ZYRTEC) 10 MG tablet   ranitidine (ZANTAC) 150 MG tablet   rivaroxaban ANTICOAGULANT (XARELTO) 15 MG TABS tablet   acetaminophen (TYLENOL) 325 MG tablet   atenolol (TENORMIN) 100 MG tablet   buPROPion (WELLBUTRIN XL) 150 MG 24 hr tablet   citalopram (CELEXA) 40 MG tablet   fluconazole (DIFLUCAN) 200 MG tablet   hydrochlorothiazide (HYDRODIURIL) 25 MG tablet   Multiple Vitamins-Minerals (MULTIVITAMIN ADULT PO)   senna-docusate (SENOKOT-S/PERICOLACE) 8.6-50 MG tablet   tiZANidine (ZANAFLEX) 2 MG tablet         Review of Systems   HENT: Positive for facial swelling.    Skin: Positive for rash.   All other systems reviewed and are negative.      Physical Exam   BP: 117/84  Pulse: 74  Heart Rate: 79  Temp: 99  F (37.2  C)  Resp: 22  Height: 182.9 cm (6')  Weight: (!) 165.6 kg (365  lb)  SpO2: 97 %      Physical Exam   Constitutional: He appears well-developed and well-nourished. No distress.   HENT:   Head: Normocephalic.   Mouth/Throat: Oropharynx is clear and moist.   Mild swelling noted to lips, no swelling noted to tongue or posterior oropharynx.   Eyes: EOM are normal. Pupils are equal, round, and reactive to light.   Neck: Normal range of motion.   Cardiovascular: Normal rate, regular rhythm and intact distal pulses.   Pulmonary/Chest: Effort normal and breath sounds normal. No stridor. No respiratory distress. He has no wheezes.   Abdominal: Soft. Bowel sounds are normal.   Musculoskeletal:   Left lower extremity is splinted   Skin: Skin is warm and dry. Capillary refill takes less than 2 seconds. He is not diaphoretic.   Light urticarial rash to lower abdomen   Psychiatric: He has a normal mood and affect.       ED Course     Procedures      No results found for this or any previous visit (from the past 24 hour(s)).    Medications   ranitidine (ZANTAC) tablet 150 mg (150 mg Oral Given 3/30/19 2213)       Assessments & Plan (with Medical Decision Making)  Delbert is a 53-year-old male, presents to the emergency department today with allergic reaction, likely to his Eliquis as this is the only new medication patient has started with no other new exposures.  Patient arrives here with no concerning signs for respiratory distress or anaphylaxis.  Symptoms have nearly resolved with Benadryl at home prior to arrival here.  Patient was given a dose of Zantac here.  I am going to continue him on this twice daily for 5 days as well as Zyrtec and Benadryl as needed.  With regard to patient's Eliquis, this will be changed to Xarelto, 15 mg twice daily.  The patient can follow-up with primary care this next week as scheduled.  Reasons to return to the emergency department discussed.  Patient instructed to avoid any further Eliquis dosing.  Patient and his wife are agreeable to plan of care and patient  was discharged in stable condition.     I have reviewed the nursing notes.    I have reviewed the findings, diagnosis, plan and need for follow up with the patient.       Medication List      Started    cetirizine 10 MG tablet  Commonly known as:  zyrTEC  10 mg, Oral, DAILY     ranitidine 150 MG tablet  Commonly known as:  ZANTAC  150 mg, Oral, 2 TIMES DAILY     rivaroxaban ANTICOAGULANT 15 MG Tabs tablet  Commonly known as:  XARELTO  15 mg, Oral, 2 TIMES DAILY WITH MEALS        Discontinued    apixaban ANTICOAGULANT 5 MG tablet  Commonly known as:  ELIQUIS            Final diagnoses:   Allergy to drug - Eliquis       3/30/2019   [facility] EMERGENCY DEPARTMENT     Flower Russo APRN CNP  03/31/19 0101

## 2019-03-31 NOTE — ED TRIAGE NOTES
Pt presents with concerns of possible medication reaction.  Pt started Eliquis at 0900 yesterday.  Pt states that he noticed today a few hives, edema on the hand, and earlier swelling of the upper lip.

## 2019-03-31 NOTE — DISCHARGE INSTRUCTIONS
STOP THE ELIQUIS   Start the Xarelto tomorrow morning.  Return here with any complications.......nice to meet you both!!

## 2019-04-01 ENCOUNTER — PATIENT OUTREACH (OUTPATIENT)
Dept: CARE COORDINATION | Facility: CLINIC | Age: 54
End: 2019-04-01

## 2019-04-01 ASSESSMENT — ACTIVITIES OF DAILY LIVING (ADL): DEPENDENT_IADLS:: INDEPENDENT

## 2019-04-01 NOTE — PROGRESS NOTES
Clinic Care Coordination Contact    Clinic Care Coordination Contact  OUTREACH    Referral Information:  Referral Source: ED Follow-Up    Primary Diagnosis: Respiratory Disorders - other    Chief Complaint   Patient presents with     Clinic Care Coordination - Post Hospital     RN   [place] Utilization:   Clinic Utilization  Difficulty keeping appointments:: No  Compliance Concerns: No  No-Show Concerns: No  No PCP office visit in Past Year: No  Utilization    Last refreshed: 4/1/2019  8:41 AM:  Hospital Admissions 1           Last refreshed: 4/1/2019  8:41 AM:  ED Visits 1           Last refreshed: 4/1/2019  8:41 AM:  No Show Count (past year) 0              Current as of: 4/1/2019  8:41 AM          Clinical Concerns:    Current Medical Concerns:  Patient originally slipped down his stairs at home on 3/13/19 and had a closed fracture of left tibia and fibula. He had open reduction and internal fixation on 3/22/19.     On 3/28/19 patient experienced shortness of breath with minimal exertion. He was seen in [facility] ED and found to have acute pulmonary emoblism. He was started on Eliquis.     On 3/30/19 patient presented to the ED for hives, swelling of lip. He was having allergic reaction to Eliquis.  Patient was changed to Xarelto and sent home with zyrtec, benadryl and zantac.     Patient is at work today. He states the shortness of breath is much improved. He denies any chest pain or pressure. He is aware of what symptoms to watch for and understands the need to report to ED immediately if they occur.    Patient denies any pain in his ankle. He is currently using a wheelchair to get around. He states he has one at home and one at work.     Current Behavioral Concerns: None      Education Provided to patient: PE symptoms. Elevate foot,      Pain  Pain (GOAL):: No  Health Maintenance Reviewed: Not assessed    Clinical Pathway: None    Medication Management:  Patient is understanding of his medications and  independent in administration      Functional Status:  Dependent ADLs:: Wheelchair-independent  Dependent IADLs:: Independent  Bed or wheelchair confined:: No  Mobility Status: Independent w/Device  Fallen 2 or more times in the past year?: No  Any fall with injury in the past year?: Yes    Living Situation:  Current living arrangement:: I live in a private home  Type of residence:: Private home - stairs    Diet/Exercise/Sleep:  Diet:: Regular  Inadequate nutrition (GOAL):: No  Food Insecurity: No  Tube Feeding: No  Exercise:: Unable to exercise  Inadequate activity/exercise (GOAL):: No  Significant changes in sleep pattern (GOAL): No    Transportation:  Transportation concerns (GOAL):: No - patient is driving  Transportation means:: Accessible car, Regular car     Psychosocial:  Mandaen or spiritual beliefs that impact treatment:: No  Mental health DX:: No  Mental health management concern (GOAL):: No  Informal Support system:: Significant other     Financial/Insurance:   Financial/Insurance concerns (GOAL):: No     Resources and Interventions:  Current Resources:    ;   Community Resources: None  Supplies used at home:: None  Equipment Currently Used at Home: wheelchair, manual, crutches, shower chair    Advance Care Plan/Directive  Advanced Care Plans/Directives on file:: No    Referrals Placed: None     Patient/Caregiver understanding: Good understanding   Future Appointments              In 2 days Hugo Watts APRN [facility]    In 4 days Lissa Gamez APRN [facility] [place] [facility]      Plan: Patient will follow up with scheduled appointments.     Provided contact information for clinic care coordinator. Patient will call if he has questions or concerns. Will not open to active care coordination at this time.     rGiselda Hernadez RN, [facility] RN Coordinator  [facility]   4/1/2019    10:00  AM  [phone]

## 2019-04-03 ENCOUNTER — OFFICE VISIT (OUTPATIENT)
Dept: ORTHOPEDICS | Facility: CLINIC | Age: 54
End: 2019-04-03
Payer: COMMERCIAL

## 2019-04-03 ENCOUNTER — ANCILLARY PROCEDURE (OUTPATIENT)
Dept: GENERAL RADIOLOGY | Facility: CLINIC | Age: 54
End: 2019-04-03
Attending: NURSE PRACTITIONER
Payer: COMMERCIAL

## 2019-04-03 VITALS — HEIGHT: 72 IN | WEIGHT: 315 LBS | TEMPERATURE: 97.6 F | BODY MASS INDEX: 42.66 KG/M2

## 2019-04-03 DIAGNOSIS — S82.62XA CLOSED DISPLACED FRACTURE OF LATERAL MALLEOLUS OF LEFT FIBULA, INITIAL ENCOUNTER: ICD-10-CM

## 2019-04-03 DIAGNOSIS — Z98.890 S/P ORIF (OPEN REDUCTION INTERNAL FIXATION) FRACTURE: ICD-10-CM

## 2019-04-03 DIAGNOSIS — Z87.81 S/P ORIF (OPEN REDUCTION INTERNAL FIXATION) FRACTURE: ICD-10-CM

## 2019-04-03 DIAGNOSIS — S82.62XD CLOSED DISPLACED FRACTURE OF LATERAL MALLEOLUS OF LEFT FIBULA WITH ROUTINE HEALING, SUBSEQUENT ENCOUNTER: Primary | ICD-10-CM

## 2019-04-03 PROCEDURE — 99024 POSTOP FOLLOW-UP VISIT: CPT | Performed by: NURSE PRACTITIONER

## 2019-04-03 PROCEDURE — 73610 X-RAY EXAM OF ANKLE: CPT | Mod: TC

## 2019-04-03 ASSESSMENT — MIFFLIN-ST. JEOR: SCORE: 2543.63

## 2019-04-03 ASSESSMENT — PAIN SCALES - GENERAL: PAINLEVEL: NO PAIN (0)

## 2019-04-03 NOTE — PROGRESS NOTES
Orthopedic Clinic Post-Operative Note    CHIEF COMPLAINT:   Chief Complaint   Patient presents with     Surgical Followup     DOS: 3/22/2019~ Left ankle open reduction and internal fixation, lateral malleolus and syndesmosis. ~12 days postop       HISTORY OF PRESENT ILLNESS  Returns to clinic for first post-op off.  Initially after surgery had issue with thrush to the tongue as well as some back spasms/issues patient was placed on an antibiotic and this cleared up and was using some of the muscle relaxers for the back. His back is much better  Then 6 days after surgery, was seen in ED due to difficulty breathing and shortness of breath and was found to have bilateral PEs. This was initially treated with Eliquis but however had an allergic reaction to the Eliquis, and was then subsequently treated with Xeralto.  He has done better on xeralto and the respiratory symptoms: are much improved. States still feels some shortness of breath but much better.  The hives, and other issues from the Eliquis is much improved. No facial edema, or tongue swelling.  States the ankle itself is very good. Pain was pretty much resolved within the first few days after surgery.  Stopped narcotics by the first couple of days.  States no numbness, no issues, happy with the ankle.  Denies incision concerns.  Left splint on, Nonweight bearing with crutches/wheelchair. Has returned to work, works as an owner of company, does desk work. Elevating and icing the leg frequently.     Patient's past medical, surgical, social and family histories reviewed.     Past Medical History:   Diagnosis Date     Depression      History of tobacco abuse     quit 2006     HTN (hypertension)        Past Surgical History:   Procedure Laterality Date     NO HISTORY OF SURGERY       OPEN REDUCTION INTERNAL FIXATION ANKLE Left 3/22/2019    Procedure: OPEN REDUCTION INTERNAL FIXATION LEFT ANKLE;  Surgeon: Amrit Rivera DO;  Location:  OR        Medications:    Current Outpatient Medications on File Prior to Visit:  atenolol (TENORMIN) 100 MG tablet Take 1 tablet (100 mg) by mouth daily   buPROPion (WELLBUTRIN XL) 150 MG 24 hr tablet Take 1 tablet (150 mg) by mouth daily   cetirizine (ZYRTEC) 10 MG tablet Take 1 tablet (10 mg) by mouth daily for 5 days   citalopram (CELEXA) 40 MG tablet Take 1 tablet (40 mg) by mouth daily   hydrochlorothiazide (HYDRODIURIL) 25 MG tablet TAKE 1 TAB BY MOUTH ONCE DAILY IN THE MORNING   Multiple Vitamins-Minerals (MULTIVITAMIN ADULT PO)    ranitidine (ZANTAC) 150 MG tablet Take 1 tablet (150 mg) by mouth 2 times daily for 5 days   rivaroxaban ANTICOAGULANT (XARELTO) 15 MG TABS tablet Take 1 tablet (15 mg) by mouth 2 times daily (with meals) for 21 days   acetaminophen (TYLENOL) 325 MG tablet Take 3 tablets (975 mg) by mouth every 8 hours as needed for mild pain (Patient not taking: Reported on 4/3/2019)   fluconazole (DIFLUCAN) 200 MG tablet Take 1 tablet (200 mg) by mouth daily for 14 days Contact your primary care provider if not improving. (Patient not taking: Reported on 4/3/2019)   senna-docusate (SENOKOT-S/PERICOLACE) 8.6-50 MG tablet Take 1-2 tablets by mouth 2 times daily (Patient not taking: Reported on 4/3/2019)   tiZANidine (ZANAFLEX) 2 MG tablet Take 1-2 tablets (2-4 mg) by mouth 3 times daily as needed for muscle spasms (Patient not taking: Reported on 4/3/2019)     No current facility-administered medications on file prior to visit.     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   Tobacco Use     Smoking status: Former Smoker     Packs/day: 1.00     Years: 20.00     Pack years: 20.00     Smokeless tobacco: Never Used     Tobacco comment: quit 13 years    Substance and Sexual Activity     Alcohol use: Yes     Comment: none      Drug use: No     Sexual activity: Yes     Partners:  Female       Family History   Problem Relation Age of Onset     Chronic Obstructive Pulmonary Disease Mother      Hypertension Father        REVIEW OF SYSTEMS  General: negative for, night sweats, dizziness, fatigue  Resp: No shortness of breath and no cough  CV: negative for chest pain, syncope or near-syncope  GI: negative for nausea, vomiting and diarrhea  : negative for dysuria and hematuria  Musculoskeletal: as above  Neurologic: negative for syncope   Hematologic: negative for bleeding disorder    Physical Exam:  Vitals: Temp 97.6  F (36.4  C) (Temporal)   Ht 1.829 m (6')   Wt (!) 165.6 kg (365 lb)   BMI 49.50 kg/m    BMI= Body mass index is 49.5 kg/m .  Constitutional: healthy, alert and no acute distress   Psychiatric: mentation appears normal and affect normal/bright  NEURO: no focal deficits  SKIN:  Both the lateral and medial incisions healing well, well approximated skin edges, without signs of infection including no erythema, incision breakdown or purlent drainage. Staples intact.  JOINT/EXTREMITIES: Left lower extremity. Very mild swelling to foot. Some bruising. No deformity.   Mildly tender to incision. No other focal tenderness.  No numbness along incisions or in leg. 2+ dorsal pedalis pulse.  Toes well perfused.  Motion intact to knee.  Calf soft, non-tender, no significant swelling or masses.  Motion to ankle not tested. Motor intact to toes.  Sensation intact to foot/toes.  GAIT: not tested. Nonweight bearing to left leg.     Diagnostic Modalities:  Left ankle xrays reviewed and compared against intra-op films: Plate, screws and button all in place with no evidence of failure. Alignment remains unchanged from intra-op xrays and remains in anatomic alignment. Ankle mortise maintained. Oblique fracture to the lateral malleolus visible with no evidence of healing yet.     Independent visualization of the images was performed.      Impression:   Chief Complaint   Patient presents with      Surgical Followup     DOS: 3/22/2019~ Left ankle open reduction and internal fixation, lateral malleolus and syndesmosis. ~12 days postop   Recovering as expected.   Develop PE during recovery, currently be treated, symptomatically improving.  On Xeralto. Following with PCP.    Plan:   Activity: Non-weight bearing. Will transition to boot. Patient has a fracture boot from prior to surgery. Appears well fitting and in good condition. No new boot given. Continue crutches or wheelchair.  Wear this 24/7 except to remove 1-2 times a day and PRN for wound care.    Staples/Sutures out: staples removed, steri strips applied. Wound care discussed. No soaking the wound, no baths, hot tubs, pools etc. There was some very slight sanguinous drainage from the staple sites when they were removed. No drainage from incision sites.  Dressing applied. daily dressing change. Ok to be open to air if no drainage, the very slight bleeding from the staple sites should end in the next day. Recommended large sock under boot. Ok to clear incisions 1-2 times a day with soap and water and pad dry. No scrubbing. Steri strips will fall off over the course of the next few days.  Pain controlled: Yes. Continue to use: Ice, elevation, rest. No refills requested.   Immobilzation: yes, with the fracture boot. treat this as a cast  Physical Therapy: none at this time.   Rest, Ice, Elevation  Return to clinic 4, week(s), or sooner as needed for changes.    Re-x-ray on return: Yes.  NANDO Clay, CNP  Orthopedic Surgery

## 2019-04-03 NOTE — LETTER
4/3/2019         RE: Delbert Dowling  [address] [facility] [address]        Dear Colleague,    Thank you for referring your patient, Delbert Dowling, to the [facility]. Please see a copy of my visit note below.    Orthopedic Clinic Post-Operative Note    CHIEF COMPLAINT:   Chief Complaint   Patient presents with     Surgical Followup     DOS: 3/22/2019~ Left ankle open reduction and internal fixation, lateral malleolus and syndesmosis. ~12 days postop       HISTORY OF PRESENT ILLNESS  Returns to clinic for first post-op off.  Initially after surgery had issue with thrush to the tongue as well as some back spasms/issues patient was placed on an antibiotic and this cleared up and was using some of the muscle relaxers for the back. His back is much better  Then 6 days after surgery, was seen in ED due to difficulty breathing and shortness of breath and was found to have bilateral PEs. This was initially treated with Eliquis but however had an allergic reaction to the Eliquis, and was then subsequently treated with Xeralto.  He has done better on xeralto and the respiratory symptoms: are much improved. States still feels some shortness of breath but much better.  The hives, and other issues from the Eliquis is much improved. No facial edema, or tongue swelling.  States the ankle itself is very good. Pain was pretty much resolved within the first few days after surgery.  Stopped narcotics by the first couple of days.  States no numbness, no issues, happy with the ankle.  Denies incision concerns.  Left splint on, Nonweight bearing with crutches/wheelchair. Has returned to work, works as an owner of company, does desk work. Elevating and icing the leg frequently.     Patient's past medical, surgical, social and family histories reviewed.     Past Medical History:   Diagnosis Date     Depression      History of tobacco abuse     quit 2006     HTN (hypertension)        Past Surgical History:   Procedure  Laterality Date     NO HISTORY OF SURGERY       OPEN REDUCTION INTERNAL FIXATION ANKLE Left 3/22/2019    Procedure: OPEN REDUCTION INTERNAL FIXATION LEFT ANKLE;  Surgeon: Amrit Rivera DO;  Location:  OR       Medications:    Current Outpatient Medications on File Prior to Visit:  atenolol (TENORMIN) 100 MG tablet Take 1 tablet (100 mg) by mouth daily   buPROPion (WELLBUTRIN XL) 150 MG 24 hr tablet Take 1 tablet (150 mg) by mouth daily   cetirizine (ZYRTEC) 10 MG tablet Take 1 tablet (10 mg) by mouth daily for 5 days   citalopram (CELEXA) 40 MG tablet Take 1 tablet (40 mg) by mouth daily   hydrochlorothiazide (HYDRODIURIL) 25 MG tablet TAKE 1 TAB BY MOUTH ONCE DAILY IN THE MORNING   Multiple Vitamins-Minerals (MULTIVITAMIN ADULT PO)    ranitidine (ZANTAC) 150 MG tablet Take 1 tablet (150 mg) by mouth 2 times daily for 5 days   rivaroxaban ANTICOAGULANT (XARELTO) 15 MG TABS tablet Take 1 tablet (15 mg) by mouth 2 times daily (with meals) for 21 days   acetaminophen (TYLENOL) 325 MG tablet Take 3 tablets (975 mg) by mouth every 8 hours as needed for mild pain (Patient not taking: Reported on 4/3/2019)   fluconazole (DIFLUCAN) 200 MG tablet Take 1 tablet (200 mg) by mouth daily for 14 days Contact your primary care provider if not improving. (Patient not taking: Reported on 4/3/2019)   senna-docusate (SENOKOT-S/PERICOLACE) 8.6-50 MG tablet Take 1-2 tablets by mouth 2 times daily (Patient not taking: Reported on 4/3/2019)   tiZANidine (ZANAFLEX) 2 MG tablet Take 1-2 tablets (2-4 mg) by mouth 3 times daily as needed for muscle spasms (Patient not taking: Reported on 4/3/2019)     No current facility-administered medications on file prior to visit.     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   Tobacco Use     Smoking status: Former Smoker     Packs/day: 1.00     Years: 20.00      Pack years: 20.00     Smokeless tobacco: Never Used     Tobacco comment: quit 13 years    Substance and Sexual Activity     Alcohol use: Yes     Comment: none      Drug use: No     Sexual activity: Yes     Partners: Female       Family History   Problem Relation Age of Onset     Chronic Obstructive Pulmonary Disease Mother      Hypertension Father        REVIEW OF SYSTEMS  General: negative for, night sweats, dizziness, fatigue  Resp: No shortness of breath and no cough  CV: negative for chest pain, syncope or near-syncope  GI: negative for nausea, vomiting and diarrhea  : negative for dysuria and hematuria  Musculoskeletal: as above  Neurologic: negative for syncope   Hematologic: negative for bleeding disorder    Physical Exam:  Vitals: Temp 97.6  F (36.4  C) (Temporal)   Ht 1.829 m (6')   Wt (!) 165.6 kg (365 lb)   BMI 49.50 kg/m     BMI= Body mass index is 49.5 kg/m .  Constitutional: healthy, alert and no acute distress   Psychiatric: mentation appears normal and affect normal/bright  NEURO: no focal deficits  SKIN:  Both the lateral and medial incisions healing well, well approximated skin edges, without signs of infection including no erythema, incision breakdown or purlent drainage. Staples intact.  JOINT/EXTREMITIES: Left lower extremity. Very mild swelling to foot. Some bruising. No deformity.   Mildly tender to incision. No other focal tenderness.  No numbness along incisions or in leg. 2+ dorsal pedalis pulse.  Toes well perfused.  Motion intact to knee.  Calf soft, non-tender, no significant swelling or masses.  Motion to ankle not tested. Motor intact to toes.  Sensation intact to foot/toes.  GAIT: not tested. Nonweight bearing to left leg.     Diagnostic Modalities:  Left ankle xrays reviewed and compared against intra-op films: Plate, screws and button all in place with no evidence of failure. Alignment remains unchanged from intra-op xrays and remains in anatomic alignment. Ankle mortise  maintained. Oblique fracture to the lateral malleolus visible with no evidence of healing yet.     Independent visualization of the images was performed.      Impression:   Chief Complaint   Patient presents with     Surgical Followup     DOS: 3/22/2019~ Left ankle open reduction and internal fixation, lateral malleolus and syndesmosis. ~12 days postop   Recovering as expected.   Develop PE during recovery, currently be treated, symptomatically improving.  On Xeralto. Following with PCP.    Plan:   Activity: Non-weight bearing. Will transition to boot. Patient has a fracture boot from prior to surgery. Appears well fitting and in good condition. No new boot given. Continue crutches or wheelchair.  Wear this 24/7 except to remove 1-2 times a day and PRN for wound care.    Staples/Sutures out: staples removed, steri strips applied. Wound care discussed. No soaking the wound, no baths, hot tubs, pools etc. There was some very slight sanguinous drainage from the staple sites when they were removed. No drainage from incision sites.  Dressing applied. daily dressing change. Ok to be open to air if no drainage, the very slight bleeding from the staple sites should end in the next day. Recommended large sock under boot. Ok to clear incisions 1-2 times a day with soap and water and pad dry. No scrubbing. Steri strips will fall off over the course of the next few days.  Pain controlled: Yes. Continue to use: Ice, elevation, rest. No refills requested.   Immobilzation: yes, with the fracture boot. treat this as a cast  Physical Therapy: none at this time.   Rest, Ice, Elevation  Return to clinic 4, week(s), or sooner as needed for changes.    Re-x-ray on return: Yes.  NANDO Clay, CNP  Orthopedic Surgery        Again, thank you for allowing me to participate in the care of your patient.        Sincerely,        Hugo Watts, NANDO CNP

## 2019-04-05 ENCOUNTER — OFFICE VISIT (OUTPATIENT)
Dept: FAMILY MEDICINE | Facility: CLINIC | Age: 54
End: 2019-04-05
Payer: COMMERCIAL

## 2019-04-05 VITALS
TEMPERATURE: 98.1 F | WEIGHT: 315 LBS | BODY MASS INDEX: 42.66 KG/M2 | HEART RATE: 64 BPM | HEIGHT: 72 IN | SYSTOLIC BLOOD PRESSURE: 124 MMHG | OXYGEN SATURATION: 98 % | DIASTOLIC BLOOD PRESSURE: 84 MMHG | RESPIRATION RATE: 18 BRPM

## 2019-04-05 DIAGNOSIS — I10 ESSENTIAL HYPERTENSION: ICD-10-CM

## 2019-04-05 DIAGNOSIS — Z00.01 ENCOUNTER FOR ROUTINE ADULT MEDICAL EXAM WITH ABNORMAL FINDINGS: Primary | ICD-10-CM

## 2019-04-05 DIAGNOSIS — I26.99 OTHER ACUTE PULMONARY EMBOLISM WITHOUT ACUTE COR PULMONALE (H): ICD-10-CM

## 2019-04-05 PROCEDURE — 99396 PREV VISIT EST AGE 40-64: CPT | Performed by: NURSE PRACTITIONER

## 2019-04-05 ASSESSMENT — ANXIETY QUESTIONNAIRES
6. BECOMING EASILY ANNOYED OR IRRITABLE: NOT AT ALL
4. TROUBLE RELAXING: NOT AT ALL
2. NOT BEING ABLE TO STOP OR CONTROL WORRYING: NOT AT ALL
7. FEELING AFRAID AS IF SOMETHING AWFUL MIGHT HAPPEN: NOT AT ALL
3. WORRYING TOO MUCH ABOUT DIFFERENT THINGS: NOT AT ALL
1. FEELING NERVOUS, ANXIOUS, OR ON EDGE: NOT AT ALL
7. FEELING AFRAID AS IF SOMETHING AWFUL MIGHT HAPPEN: NOT AT ALL
GAD7 TOTAL SCORE: 0
5. BEING SO RESTLESS THAT IT IS HARD TO SIT STILL: NOT AT ALL

## 2019-04-05 ASSESSMENT — ENCOUNTER SYMPTOMS
PARESTHESIAS: 0
NERVOUS/ANXIOUS: 0
ABDOMINAL PAIN: 0
CONSTIPATION: 0
EYE PAIN: 0
WEAKNESS: 0
COUGH: 0
MYALGIAS: 0
DIZZINESS: 0
JOINT SWELLING: 0
DYSURIA: 0
PALPITATIONS: 0
CHILLS: 0
HEARTBURN: 0
DIARRHEA: 0
NAUSEA: 0
SORE THROAT: 0
FEVER: 0
HEMATOCHEZIA: 0
SHORTNESS OF BREATH: 0
HEMATURIA: 0
FREQUENCY: 0
HEADACHES: 0
ARTHRALGIAS: 0

## 2019-04-05 ASSESSMENT — PATIENT HEALTH QUESTIONNAIRE - PHQ9
SUM OF ALL RESPONSES TO PHQ QUESTIONS 1-9: 0
SUM OF ALL RESPONSES TO PHQ QUESTIONS 1-9: 0

## 2019-04-05 ASSESSMENT — MIFFLIN-ST. JEOR: SCORE: 2538.19

## 2019-04-05 ASSESSMENT — PAIN SCALES - GENERAL: PAINLEVEL: NO PAIN (0)

## 2019-04-06 ASSESSMENT — ANXIETY QUESTIONNAIRES: GAD7 TOTAL SCORE: 0

## 2019-04-06 ASSESSMENT — PATIENT HEALTH QUESTIONNAIRE - PHQ9: SUM OF ALL RESPONSES TO PHQ QUESTIONS 1-9: 0

## 2019-05-01 ENCOUNTER — ANCILLARY PROCEDURE (OUTPATIENT)
Dept: GENERAL RADIOLOGY | Facility: CLINIC | Age: 54
End: 2019-05-01
Attending: ORTHOPAEDIC SURGERY
Payer: COMMERCIAL

## 2019-05-01 ENCOUNTER — OFFICE VISIT (OUTPATIENT)
Dept: ORTHOPEDICS | Facility: CLINIC | Age: 54
End: 2019-05-01
Payer: COMMERCIAL

## 2019-05-01 VITALS
DIASTOLIC BLOOD PRESSURE: 62 MMHG | WEIGHT: 315 LBS | HEIGHT: 72 IN | HEART RATE: 64 BPM | SYSTOLIC BLOOD PRESSURE: 111 MMHG | BODY MASS INDEX: 42.66 KG/M2

## 2019-05-01 DIAGNOSIS — Z98.890 S/P ORIF (OPEN REDUCTION INTERNAL FIXATION) FRACTURE: Primary | ICD-10-CM

## 2019-05-01 DIAGNOSIS — Z87.81 S/P ORIF (OPEN REDUCTION INTERNAL FIXATION) FRACTURE: ICD-10-CM

## 2019-05-01 DIAGNOSIS — Z87.81 S/P ORIF (OPEN REDUCTION INTERNAL FIXATION) FRACTURE: Primary | ICD-10-CM

## 2019-05-01 DIAGNOSIS — Z98.890 S/P ORIF (OPEN REDUCTION INTERNAL FIXATION) FRACTURE: ICD-10-CM

## 2019-05-01 PROCEDURE — 99024 POSTOP FOLLOW-UP VISIT: CPT | Performed by: ORTHOPAEDIC SURGERY

## 2019-05-01 PROCEDURE — 73610 X-RAY EXAM OF ANKLE: CPT | Mod: TC

## 2019-05-01 ASSESSMENT — MIFFLIN-ST. JEOR: SCORE: 2534.56

## 2019-05-01 ASSESSMENT — PAIN SCALES - GENERAL: PAINLEVEL: MILD PAIN (2)

## 2019-05-01 NOTE — PROGRESS NOTES
Orthopedic Clinic Post-Operative Note    CHIEF COMPLAINT:   Chief Complaint   Patient presents with     RECHECK     left ankle follow up     Surgical Followup     DOS: 3/22/2019~ Left ankle open reduction and internal fixation, lateral malleolus and syndesmosis. ~6 weeks postop       HISTORY OF PRESENT ILLNESS  Doing well.  No pain.  Wearing his boot.  He has occasionally put some weight on the foot with no issues.    Patient's past medical, surgical, social and family histories reviewed.     Past Medical History:   Diagnosis Date     Depression      History of tobacco abuse     quit 2006     HTN (hypertension)        Past Surgical History:   Procedure Laterality Date     OPEN REDUCTION INTERNAL FIXATION ANKLE Left 3/22/2019    Procedure: OPEN REDUCTION INTERNAL FIXATION LEFT ANKLE;  Surgeon: Amrit Rivera DO;  Location:  OR       Medications:    Current Outpatient Medications on File Prior to Visit:  acetaminophen (TYLENOL) 325 MG tablet Take 3 tablets (975 mg) by mouth every 8 hours as needed for mild pain (Patient not taking: Reported on 4/3/2019)   atenolol (TENORMIN) 100 MG tablet Take 1 tablet (100 mg) by mouth daily   buPROPion (WELLBUTRIN XL) 150 MG 24 hr tablet Take 1 tablet (150 mg) by mouth daily   citalopram (CELEXA) 40 MG tablet Take 1 tablet (40 mg) by mouth daily   hydrochlorothiazide (HYDRODIURIL) 25 MG tablet TAKE 1 TAB BY MOUTH ONCE DAILY IN THE MORNING   Multiple Vitamins-Minerals (MULTIVITAMIN ADULT PO)    rivaroxaban ANTICOAGULANT (XARELTO) 15 MG TABS tablet Take 1 tablet (15 mg) by mouth 2 times daily (with meals)   senna-docusate (SENOKOT-S/PERICOLACE) 8.6-50 MG tablet Take 1-2 tablets by mouth 2 times daily (Patient not taking: Reported on 4/3/2019)   tiZANidine (ZANAFLEX) 2 MG tablet Take 1-2 tablets (2-4 mg) by mouth 3 times daily as needed for muscle spasms (Patient not taking: Reported on 4/3/2019)     No current facility-administered medications on file prior to visit.      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   Tobacco Use     Smoking status: Former Smoker     Packs/day: 1.00     Years: 20.00     Pack years: 20.00     Smokeless tobacco: Never Used     Tobacco comment: quit 13 years    Substance and Sexual Activity     Alcohol use: Yes     Comment: none      Drug use: No     Sexual activity: Yes     Partners: Female       Family History   Problem Relation Age of Onset     Chronic Obstructive Pulmonary Disease Mother      Hypertension Father        REVIEW OF SYSTEMS  General: negative for, night sweats, dizziness, fatigue  Resp: No shortness of breath and no cough  CV: negative for chest pain, syncope or near-syncope  GI: negative for nausea, vomiting and diarrhea  : negative for dysuria and hematuria  Musculoskeletal: as above  Neurologic: negative for syncope   Hematologic: negative for bleeding disorder    Physical Exam:  Vitals: /62   Pulse 64   Ht 1.829 m (6')   Wt (!) 164.7 kg (363 lb)   BMI 49.23 kg/m    BMI= Body mass index is 49.23 kg/m .  Constitutional: healthy, alert and no acute distress   Psychiatric: mentation appears normal and affect normal/bright  NEURO: no focal deficits  SKIN: .well healed, no erythema, no incision breakdown and no drainage.  JOINT/EXTREMITIES: Affected stiffness with dorsiflexion and plantarflexion.  Distal neurovascular intact.  No edema.  No pain with rotation.  GAIT: not tested     Diagnostic Modalities:  left ankle X-ray: Lateral malleolus plate and screws in place.  No evidence of loosening or position changes.  Syndesmosis fixation noted as well.  Again no changes.  Decreased lucency at the fracture site.  There is some calcification along the syndesmosis.  Independent visualization of the images was performed.      Impression:   Chief Complaint   Patient presents with     RECHECK     left ankle  follow up     Surgical Followup     DOS: 3/22/2019~ Left ankle open reduction and internal fixation, lateral malleolus and syndesmosis. ~6 weeks postop   Routine healing.    Plan:   I reviewed his radiographs.  He does have some calcification along the syndesmosis.  At this point we will continue to observe.  Recommend he start weightbearing as tolerated with a fracture boot.  Start at 25% body weight in progress as he tolerates.  Once fully weightbearing discontinue the boot and switch to regular shoe wear.  May need to use of crutches during this process.  We discussed physical therapy.  At this point is declined.  He will call if he thinks he needs it.  Plan to see him back in 4-week.  Return to clinic 4, week(s), or sooner as needed for changes.    Re-x-ray on return: Yes.  Left ankle 3 view weightbearing    Mariusz Rivera D.O.

## 2019-05-01 NOTE — NURSING NOTE
Delbert Dowling is a 53 year old male who presents for:  Chief Complaint   Patient presents with     RECHECK     left ankle follow up     Surgical Followup     DOS: 3/22/2019~ Left ankle open reduction and internal fixation, lateral malleolus and syndesmosis. ~6 weeks postop        Initial Vitals:  Ht 1.829 m (6')   Wt (!) 164.7 kg (363 lb)   BMI 49.23 kg/m   Estimated body mass index is 49.23 kg/m  as calculated from the following:    Height as of this encounter: 1.829 m (6').    Weight as of this encounter: 164.7 kg (363 lb).. Body surface area is 2.89 meters squared. BP completed using cuff size: large  Mild Pain (2)        Nursing Comments        Shelley Lopez CMA

## 2019-05-16 ENCOUNTER — MYC MEDICAL ADVICE (OUTPATIENT)
Dept: FAMILY MEDICINE | Facility: CLINIC | Age: 54
End: 2019-05-16

## 2019-06-05 ENCOUNTER — TELEPHONE (OUTPATIENT)
Dept: FAMILY MEDICINE | Facility: CLINIC | Age: 54
End: 2019-06-05

## 2019-06-05 NOTE — TELEPHONE ENCOUNTER
----- Message from NANDO Lorenzo CNP sent at 4/5/2019  1:20 PM CDT -----  Regarding: sleep eval  Call pt ,reminder to set up sleep study. Lissa Gamez

## 2019-06-13 ENCOUNTER — TELEPHONE (OUTPATIENT)
Dept: FAMILY MEDICINE | Facility: CLINIC | Age: 54
End: 2019-06-13

## 2019-06-13 DIAGNOSIS — M25.562 LEFT KNEE PAIN, UNSPECIFIED CHRONICITY: ICD-10-CM

## 2019-06-13 DIAGNOSIS — M25.572 PAIN IN JOINT, ANKLE AND FOOT, LEFT: Primary | ICD-10-CM

## 2019-06-13 NOTE — TELEPHONE ENCOUNTER
Reason for Call:  Other call back    Detailed comments: pt's wife, Annie (we have consent to communicate for her) is calling, requesting KL call her back to speak to Delbert and help convince him to go to PT. He was supposed to years ago but never did and now he's worse and has orders to do PT but doesn't want to. She states he listens to KL better than her. Please advise.     Phone Number Patient can be reached at: [phone]    Best Time: any     Can we leave a detailed message on this number? YES    Call taken on 6/13/2019 at 3:17 PM by Michell Tong

## 2019-06-21 ENCOUNTER — THERAPY VISIT (OUTPATIENT)
Dept: PHYSICAL THERAPY | Facility: CLINIC | Age: 54
End: 2019-06-21
Attending: NURSE PRACTITIONER
Payer: COMMERCIAL

## 2019-06-21 DIAGNOSIS — M25.562 LEFT KNEE PAIN, UNSPECIFIED CHRONICITY: ICD-10-CM

## 2019-06-21 DIAGNOSIS — M25.572 PAIN IN JOINT, ANKLE AND FOOT, LEFT: ICD-10-CM

## 2019-06-21 PROCEDURE — 97112 NEUROMUSCULAR REEDUCATION: CPT | Mod: GP | Performed by: PHYSICAL THERAPIST

## 2019-06-21 PROCEDURE — 97161 PT EVAL LOW COMPLEX 20 MIN: CPT | Mod: GP | Performed by: PHYSICAL THERAPIST

## 2019-06-21 PROCEDURE — 97110 THERAPEUTIC EXERCISES: CPT | Mod: GP | Performed by: PHYSICAL THERAPIST

## 2019-06-21 ASSESSMENT — ACTIVITIES OF DAILY LIVING (ADL)
TOTAL_ADL_ITEM_SCORE:: 57
HIGHEST_POTENTIAL_ADL_SCORE:: 84
ACTIVITIES_OF_DAILY_LIVING_MEASURE_SCORE(%):: 67.86

## 2019-06-21 NOTE — LETTER
[facility] ATHLETIC [facility] PHYSICAL [facility]  [address]  [facility] [address] [phone]    2019    Re: Delbert Dowling   :   1965  MRN:  [id number]   REFERRING PHYSICIAN:   Lissa Gamez    [facility]TH[facility]IC [facility]    Date of Initial Evaluation:  ***  Visits:  Rxs Used: 1  Reason for Referral:     Pain in joint, ankle and foot, left  Left knee pain, unspecified chronicity    EVALUATION SUMMARY    [facility]  Subjective:  The history is provided by the patient. No  was used.   Delbert Dowling being seen for Left ankle and knee pain.   Problem began 3/22/2019. Where condition occurred: at home.Problem occurred: Fell and fratured L fibula. Surgery; Left ankle open reduction and internal fixation, lateral malleolus and syndesmosis.   and reported as 2/10 on pain scale. General health as reported by patient is good. Pertinent medical history includes:  Overweight, high blood pressure, depression and smoking.    Surgeries include:  Orthopedic surgery.  Current medications:  Cardiac medication, anti-depressants and high blood pressure medication.   Primary job tasks include:  Prolonged sitting, lifting/carrying and computer work.  Pain is described as aching and is intermittent. Pain is the same all the time. Since onset symptoms are gradually improving (recently plateaued). Special tests:  X-ray. Previous treatment includes surgery. There was moderate improvement following previous treatment.   Patient is ; Lives in 2 [place] home w/ basement. Lives w/ significant other, daughter, and father lives in [facility] unit. 1 small dog. . Restrictions include:  Working in normal job without restrictions.    Barriers include:  Stairs and bathroom/bedroom on second floor.  Red flags:  None as reported by patient.                      Objective:  Observation:  Incision  observed. Appears to be well healing.  Standing Alignment:                Ankle/Foot:  Pes planus L and pes planus R    Gait:    Assistive Devices:  None  Deviations:  Ankle:  Pronation incr LGeneral Deviations:  Toe out L    Flexibility/Screens:       Lower Extremity:  Decreased left lower extremity flexibility:Gastroc            Ankle/Foot Evaluation  ROM:    AROM:    Dorsiflexion:  Left:   (KE) 10 (KF) 20  Right:   (KE) 10, (KF) 20  Plantarflexion:  Left:  54    Right:  65  Inversion:  Left:  25 +pain     Right:  45  Eversion:  15     Right:  15        Strength:    Dorsiflexion:  Left: 5/5     Pain:   Right: 5/5   Pain:    Inversion:Left: 5/5  Pain:     Right: 5/5  Pain:  Eversion:Left: 5/5  Pain:  Right: 5/5  Pain:              Strength wnl ankle: Gastroc/soleus MMT L 1/5, R 2/5; Hip strength: WNL/EQ B. Knee strength WNL/EQ B.      PALPATION:     Left ankle tenderness not present at:   incisional    EDEMA:   Left ankle edema present at: lateral (visually noted)            FUNCTIONAL TESTS:           Proprioception:  Stork Balance Test: Left: (3 trials) 3  Right: (1st trial) 31                                                     General     ROS    Assessment/Plan:    Patient is a 53 year old male with left side ankle complaints.    Patient has the following significant findings with corresponding treatment plan.                Diagnosis 1:  S/P ORIF for L Fibular fracture    Pain -  hot/cold therapy, manual therapy, splint/taping/bracing/orthotics, self management, education and home program  Decreased ROM/flexibility - manual therapy, therapeutic exercise, therapeutic activity and home program  Decreased strength - therapeutic exercise, therapeutic activities and home program  Edema - vasopneumatics, cold therapy and self management/home program  Impaired gait - gait training and home program  Decreased function - therapeutic activities and home program    Therapy Evaluation Codes:   1) History comprised  of:   Personal factors that impact the plan of care:      Age and Past/current experiences.    Comorbidity factors that impact the plan of care are:      Depression, High blood pressure, Overweight and Smoking.     Medications impacting care: Anti-depressant, Cardiac and High blood pressure.  2) Examination of Body Systems comprised of:   Body structures and functions that impact the plan of care:      Ankle.   Activity limitations that impact the plan of care are:      Squatting/kneeling, Stairs, Standing, Walking and Working.  3) Clinical presentation characteristics are:   Stable/Uncomplicated.  4) Decision-Making    Low complexity using standardized patient assessment instrument and/or measureable assessment of functional outcome.  Cumulative Therapy Evaluation is: Low complexity.    Previous and current functional limitations:  (See Goal Flow Sheet for this information)    Short term and Long term goals: (See Goal Flow Sheet for this information)     Communication ability:  Patient appears to be able to clearly communicate and understand verbal and written communication and follow directions correctly.  Treatment Explanation - The following has been discussed with the patient:   RX ordered/plan of care  Anticipated outcomes  Possible risks and side effects  This patient would benefit from PT intervention to resume normal activities.   Rehab potential is good.    Frequency:  1 X week, once daily  Duration:  for 6 weeks  Discharge Plan:  Achieve all LTG.  Independent in home treatment program.  Reach maximal therapeutic benefit.        Thank you for your referral.    INQUIRIES  Therapist:    [facility]  [id number]  [facility] [address]  Phone: [phone]  Fax: [phone]

## 2019-06-21 NOTE — PROGRESS NOTES
[facility] Initial Evaluation  Subjective:  The history is provided by the patient. No  was used.   Delbert Dowling being seen for Left ankle and knee pain.   Problem began 3/22/2019. Where condition occurred: at home.Problem occurred: Fell and fratured L fibula. Surgery; Left ankle open reduction and internal fixation, lateral malleolus and syndesmosis.   and reported as 2/10 on pain scale. General health as reported by patient is good. Pertinent medical history includes:  Overweight, high blood pressure, depression and smoking.    Surgeries include:  Orthopedic surgery.  Current medications:  Cardiac medication, anti-depressants and high blood pressure medication.   Primary job tasks include:  Prolonged sitting, lifting/carrying and computer work.  Pain is described as aching and is intermittent. Pain is the same all the time. Since onset symptoms are gradually improving (recently plateaued). Special tests:  X-ray. Previous treatment includes surgery. There was moderate improvement following previous treatment.   Patient is ; Lives in [address] home w/ basement. Lives w/ significant other, daughter, and father lives in [facility] unit. 1 small dog. . Restrictions include:  Working in normal job without restrictions.    Barriers include:  Stairs and bathroom/bedroom on second floor.  Red flags:  None as reported by patient.                      Objective:  Observation:  Incision observed. Appears to be well healing.  Standing Alignment:                Ankle/Foot:  Pes planus L and pes planus R    Gait:    Assistive Devices:  None  Deviations:  Ankle:  Pronation incr LGeneral Deviations:  Toe out L    Flexibility/Screens:       Lower Extremity:  Decreased left lower extremity flexibility:Gastroc            Ankle/Foot Evaluation  ROM:    AROM:    Dorsiflexion:  Left:   (KE) 10 (KF) 20  Right:   (KE) 10, (KF) 20  Plantarflexion:  Left:  54    Right:  65  Inversion:   Left:  25 +pain     Right:  45  Eversion:  15     Right:  15        Strength:    Dorsiflexion:  Left: 5/5     Pain:   Right: 5/5   Pain:    Inversion:Left: 5/5  Pain:     Right: 5/5  Pain:  Eversion:Left: 5/5  Pain:  Right: 5/5  Pain:              Strength wnl ankle: Gastroc/soleus MMT L 1/5, R 2/5; Hip strength: WNL/EQ B. Knee strength WNL/EQ B.      PALPATION:     Left ankle tenderness not present at:   incisional    EDEMA:   Left ankle edema present at: lateral (visually noted)            FUNCTIONAL TESTS:           Proprioception:  Stork Balance Test: Left: (3 trials) 3  Right: (1st trial) 31                                                     General     ROS    Assessment/Plan:    Patient is a 53 year old male with left side ankle complaints.    Patient has the following significant findings with corresponding treatment plan.                Diagnosis 1:  S/P ORIF for L Fibular fracture    Pain -  hot/cold therapy, manual therapy, splint/taping/bracing/orthotics, self management, education and home program  Decreased ROM/flexibility - manual therapy, therapeutic exercise, therapeutic activity and home program  Decreased strength - therapeutic exercise, therapeutic activities and home program  Edema - vasopneumatics, cold therapy and self management/home program  Impaired gait - gait training and home program  Decreased function - therapeutic activities and home program    Therapy Evaluation Codes:   1) History comprised of:   Personal factors that impact the plan of care:      Age and Past/current experiences.    Comorbidity factors that impact the plan of care are:      Depression, High blood pressure, Overweight and Smoking.     Medications impacting care: Anti-depressant, Cardiac and High blood pressure.  2) Examination of Body Systems comprised of:   Body structures and functions that impact the plan of care:      Ankle.   Activity limitations that impact the plan of care are:      Squatting/kneeling,  Stairs, Standing, Walking and Working.  3) Clinical presentation characteristics are:   Stable/Uncomplicated.  4) Decision-Making    Low complexity using standardized patient assessment instrument and/or measureable assessment of functional outcome.  Cumulative Therapy Evaluation is: Low complexity.    Previous and current functional limitations:  (See Goal Flow Sheet for this information)    Short term and Long term goals: (See Goal Flow Sheet for this information)     Communication ability:  Patient appears to be able to clearly communicate and understand verbal and written communication and follow directions correctly.  Treatment Explanation - The following has been discussed with the patient:   RX ordered/plan of care  Anticipated outcomes  Possible risks and side effects  This patient would benefit from PT intervention to resume normal activities.   Rehab potential is good.    Frequency:  1 X week, once daily  Duration:  for 6 weeks  Discharge Plan:  Achieve all LTG.  Independent in home treatment program.  Reach maximal therapeutic benefit.    Please refer to the daily flowsheet for treatment today, total treatment time and time spent performing 1:1 timed codes.

## 2019-06-24 DIAGNOSIS — I26.99 OTHER ACUTE PULMONARY EMBOLISM WITHOUT ACUTE COR PULMONALE (H): ICD-10-CM

## 2019-06-25 ENCOUNTER — MYC MEDICAL ADVICE (OUTPATIENT)
Dept: FAMILY MEDICINE | Facility: CLINIC | Age: 54
End: 2019-06-25

## 2019-06-25 DIAGNOSIS — I10 ESSENTIAL HYPERTENSION: ICD-10-CM

## 2019-06-25 DIAGNOSIS — Z00.01 ENCOUNTER FOR ROUTINE ADULT MEDICAL EXAM WITH ABNORMAL FINDINGS: ICD-10-CM

## 2019-06-25 DIAGNOSIS — F33.9 EPISODE OF RECURRENT MAJOR DEPRESSIVE DISORDER, UNSPECIFIED DEPRESSION EPISODE SEVERITY (H): ICD-10-CM

## 2019-06-25 PROCEDURE — 80061 LIPID PANEL: CPT | Performed by: NURSE PRACTITIONER

## 2019-06-25 PROCEDURE — G0103 PSA SCREENING: HCPCS | Performed by: NURSE PRACTITIONER

## 2019-06-25 PROCEDURE — 80048 BASIC METABOLIC PNL TOTAL CA: CPT | Performed by: NURSE PRACTITIONER

## 2019-06-25 PROCEDURE — 36415 COLL VENOUS BLD VENIPUNCTURE: CPT | Performed by: NURSE PRACTITIONER

## 2019-06-25 RX ORDER — BUPROPION HYDROCHLORIDE 150 MG/1
150 TABLET ORAL DAILY
Qty: 90 TABLET | Refills: 1 | Status: SHIPPED | OUTPATIENT
Start: 2019-06-25 | End: 2020-02-06

## 2019-06-25 RX ORDER — ATENOLOL 100 MG/1
100 TABLET ORAL DAILY
Qty: 90 TABLET | Refills: 2 | Status: SHIPPED | OUTPATIENT
Start: 2019-06-25 | End: 2020-04-07

## 2019-06-25 RX ORDER — HYDROCHLOROTHIAZIDE 25 MG/1
TABLET ORAL
Qty: 90 TABLET | Refills: 2 | Status: SHIPPED | OUTPATIENT
Start: 2019-06-25 | End: 2020-04-07

## 2019-06-25 RX ORDER — RIVAROXABAN 15 MG/1
TABLET, FILM COATED ORAL
Qty: 138 TABLET | Refills: 0 | OUTPATIENT
Start: 2019-06-25

## 2019-06-25 RX ORDER — CITALOPRAM HYDROBROMIDE 40 MG/1
40 TABLET ORAL DAILY
Qty: 90 TABLET | Refills: 1 | Status: SHIPPED | OUTPATIENT
Start: 2019-06-25 | End: 2020-02-06

## 2019-06-25 NOTE — TELEPHONE ENCOUNTER
Pending Prescriptions:                       Disp   Refills    XARELTO 15 MG TABS tablet [Pharmacy Med Na*138 ta*0        Sig: TAKE 1 TABLET (15 MG) BY MOUTH 2 TIMES DAILY WITH           MEALS    Routing refill request to provider for review/approval because:  Direct Oral Anticoagulant Agents Failed6/24 1:28 AM   Creatinine Clearance greater than 50 ml/min on file in past 3 mos

## 2019-06-26 LAB
ANION GAP SERPL CALCULATED.3IONS-SCNC: 10 MMOL/L (ref 3–14)
BUN SERPL-MCNC: 17 MG/DL (ref 7–30)
CALCIUM SERPL-MCNC: 9.3 MG/DL (ref 8.5–10.1)
CHLORIDE SERPL-SCNC: 103 MMOL/L (ref 94–109)
CHOLEST SERPL-MCNC: 143 MG/DL
CO2 SERPL-SCNC: 27 MMOL/L (ref 20–32)
CREAT SERPL-MCNC: 0.98 MG/DL (ref 0.66–1.25)
GFR SERPL CREATININE-BSD FRML MDRD: 88 ML/MIN/{1.73_M2}
GLUCOSE SERPL-MCNC: 89 MG/DL (ref 70–99)
HDLC SERPL-MCNC: 48 MG/DL
LDLC SERPL CALC-MCNC: 70 MG/DL
NONHDLC SERPL-MCNC: 95 MG/DL
POTASSIUM SERPL-SCNC: 4 MMOL/L (ref 3.4–5.3)
PSA SERPL-ACNC: 0.11 UG/L (ref 0–4)
SODIUM SERPL-SCNC: 140 MMOL/L (ref 133–144)
TRIGL SERPL-MCNC: 124 MG/DL

## 2019-07-23 ENCOUNTER — OFFICE VISIT (OUTPATIENT)
Dept: SLEEP MEDICINE | Facility: CLINIC | Age: 54
End: 2019-07-23
Payer: COMMERCIAL

## 2019-07-23 ENCOUNTER — THERAPY VISIT (OUTPATIENT)
Dept: SLEEP MEDICINE | Facility: CLINIC | Age: 54
End: 2019-07-23
Payer: COMMERCIAL

## 2019-07-23 VITALS
BODY MASS INDEX: 42.66 KG/M2 | WEIGHT: 315 LBS | DIASTOLIC BLOOD PRESSURE: 70 MMHG | HEART RATE: 70 BPM | SYSTOLIC BLOOD PRESSURE: 122 MMHG | OXYGEN SATURATION: 95 % | HEIGHT: 72 IN

## 2019-07-23 DIAGNOSIS — G47.33 OSA (OBSTRUCTIVE SLEEP APNEA): ICD-10-CM

## 2019-07-23 DIAGNOSIS — G47.33 OSA (OBSTRUCTIVE SLEEP APNEA): Primary | ICD-10-CM

## 2019-07-23 DIAGNOSIS — R06.83 SNORING: ICD-10-CM

## 2019-07-23 DIAGNOSIS — E66.01 MORBID OBESITY (H): ICD-10-CM

## 2019-07-23 PROCEDURE — 99205 OFFICE O/P NEW HI 60 MIN: CPT | Performed by: INTERNAL MEDICINE

## 2019-07-23 PROCEDURE — 95811 POLYSOM 6/>YRS CPAP 4/> PARM: CPT | Performed by: INTERNAL MEDICINE

## 2019-07-23 RX ORDER — ZOLPIDEM TARTRATE 10 MG/1
TABLET ORAL
Qty: 1 TABLET | Refills: 0 | Status: SHIPPED | OUTPATIENT
Start: 2019-07-23 | End: 2019-09-11

## 2019-07-23 ASSESSMENT — MIFFLIN-ST. JEOR: SCORE: 2557.24

## 2019-07-23 NOTE — NURSING NOTE
Does Delbert have a CPAP/Bipap?  No     Weight management plan: Patient was referred to their PCP to discuss a diet and exercise plan.

## 2019-07-23 NOTE — PATIENT INSTRUCTIONS
Drive Safe... Drive Alive     Sleep health profoundly affects your health, mood, and your safety. 33% of the population (one in three of us) is not getting enough sleep and many have a sleep disorder. Not getting enough sleep or having an untreated / undertreated sleep condition may make us sleepy without even knowing it. In fact, our driving could be dramatically impaired due to our sleep health. As your provider, here are some things I would like you to know about driving:     Here are some warning signs for impairment and dangerous drowsy driving:              -Having been awake more than 16 hours               -Looking tired               -Eyelid drooping              -Head nodding (it could be too late at this point)              -Driving for more than 30 minutes     Some things you could do to make the driving safer if you are experiencing some drowsiness:              -Stop driving and rest              -Call for transportation              -Make sure your sleep disorder is adequately treated     Some things that have been shown NOT to work when experiencing drowsiness while driving:              -Turning on the radio              -Opening windows              -Eating any  distracting  /  entertaining  foods (e.g., sunflower seeds, candy, or any other)              -Talking on the phone      Your decision may not only impact your life, but also the life of others. Please, remember to drive safe for yourself and all of us.    Ky Trujillo      Your BMI is Body mass index is 49.91 kg/m .    What is BMI?  Body mass index (BMI) is one way to tell whether you are at a healthy weight, overweight, or obese. It measures your weight in relation to your height.  A BMI of 18.5 to 24.9 is in the healthy range. A person with a BMI of 25 to 29.9 is considered overweight, and someone with a BMI of 30 or greater is considered obese.  Another way to find out if you are at risk for health problems caused by overweight and  obesity is to measure your waist. If you are a woman and your waist is more than 35 inches, or if you are a man and your waist is more than 40 inches, your risk of disease may be higher.  More than two-thirds of American adults are considered overweight or obese. Being overweight or obese increases the risk for further weight gain.  Excess weight may lead to heart disease and diabetes. Creating and following plans for healthy eating and physical activity may help you improve your health.    Methods for maintaining or losing weight.  Weight control is part of healthy lifestyle and includes exercise, emotional health, and healthy eating habits.  Careful eating habits lifelong is the mainstay of weight control.  Though there are significant health benefits from weight loss, long-term weight loss with diet alone may be very difficult to achieve- studies show long-term success with dietary management in less than 10% of people. Attaining a healthy weight may be especially difficult to achieve in those with severe obesity. In some cases, medications, devices and surgical management might be considered.    What can you do?  If you are overweight or obese and are interested in methods for weight loss, you should discuss this with your provider. In addition, we recommend that you review healthy life styles and methods for weight loss available through the National Institutes of Health patient information sites:   http://win.niddk.nih.gov/publications/index.htm

## 2019-07-23 NOTE — PROGRESS NOTES
SLEEP MEDICINE CLINIC NOTE   [facility]  Delbert Dowling 53 year old male  : 1965  MRN: [id number]      PRIMARY CARE PROVIDER: Lissa Gamez    REFERRING PROVIDER: NANDO Lorenzo CNP  [id number]  [place] [id number]    Visit Date:   2019      REASON FOR VISIT:  Evaluation of sleep apnea.      HISTORY OF PRESENT ILLNESS:  The patient is a very pleasant 53-year-old gentleman with morbid obesity, recent provoked PE, hypertension, depression who is seen in clinic for evaluation of sleep apnea given concerns for snoring and witnessed apneas.      The patient describes his routine as going to bed around 9:00 p.m.  Prior to this, he is usually in his living room watching TV.  Once he gets to bed, it takes him just a few minutes to fall asleep.  He denies waking up in the middle of the night and finally wakes up around 5:30 a.m. with the help of an alarm.  He feels rested upon awakening and does not have any significant sleep inertia.  He does not have excessive daytime sleepiness.  His [place] Sleepiness Score is 7 with no chances of falling asleep while driving.  The patient works on weekdays; however, he is at home on weekends.  He generally sleeps in for an extra half an hour to an hour on weekends.  He also takes a nap most Sundays for about 1 hour.  The patient considers himself a morning person.      He denies any features of motor restlessness suggestive of restless legs syndrome.  He denies any frequent dreams, nightmares, dream enactment behavior or bruxism.      He denies waking up with a headache in the morning.  He reports snoring which is loud in intensity.  He has rare snort arousals, frequent witnessed apneas.  He wakes up with a dry mouth often.  He does not have any difficulty breathing through his nose.  He does not have GERD.  He prefers to sleep in prone and lateral position.      He denies any features of hypocretin deficiency including cataplexy,  sleep paralysis or hypnagogic or hypnopompic hallucinations.      SOCIAL HISTORY:  The patient is single, lives at home with his girlfriend and daughter.  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  He used to smoke but quit over 10 years ago.  He denies active alcohol use.      FAMILY HISTORY:  He reports that his mother passed away from COPD.  His father has hypertension.  His brother and father both have obstructive sleep apnea and are currently being treated with CPAP.      PAST SURGICAL HISTORY:  Includes open reduction and internal fixation of his left ankle fracture.      PAST MEDICAL HISTORY:  Acute provoked PE, hypertension, depression.      MEDICATIONS:  Atenolol, bupropion, hydrochlorothiazide, citalopram, multivitamin.      REVIEW OF SYSTEMS:  A complete 14-point review of systems was performed and found negative except as noted above.      PHYSICAL EXAMINATION:   /70   Pulse 70   Ht 1.829 m (6')   Wt (!) 166.9 kg (368 lb)   SpO2 95%   BMI 49.91 kg/m    GENERAL:  A pleasant gentleman sitting comfortably, speaking in full sentences without any discomfort.   EYES:  No conjunctival pallor or icterus.   HEENT:  Mallampati class IV airway with a large tongue and prominent peripheral scalloping.  Tonsils not visualized.   NECK:  Circumference 47 cm.  No cervical or submandibular lymphadenopathy.  No malocclusion noted.   PULMONARY:  Normal intensity breath sounds bilaterally with no added sounds.   CARDIOVASCULAR:  S1, S2 normal with no murmurs or gallops.  Regular rate and rhythm.   ABDOMEN:  Soft, nontender, nondistended, normoactive bowel sounds.   NEUROLOGIC:  Grossly intact.   PSYCHIATRIC:  Normal affect.   SKIN:  No rashes on limited exam.   MUSCULOSKELETAL:  No lower extremity edema or upper extremity tremors.      ASSESSMENT AND PLAN:  The patient is a very pleasant 53-year-old gentleman who is being evaluated for sleep disordered breathing given concerning  symptoms and risk factors.  Although he is morbidly obese, he does not appear to have signs of hypercapnia.  Also, he does not have excessive daytime sleepiness or nonrestorative sleep.  Overall, he has intermediate to high pretest probability for obstructive sleep apnea.  We reviewed in detail the pathophysiology of CLAIR as well as its treatment options.  The patient is interested in performing an overnight polysomnography which was ordered.  He will return to clinic after the PSG is completed to review the results and discuss treatment options.      He was counseled regarding the importance of weight loss.  He was advised not to drive or operate heavy machinery if drowsy or sleepy.         I spent a total of 60 minutes face to face with Wiley Dowling during today's office visit. Over 50% of this time was spent counseling the patient and/or coordinating care regarding their sleep disorder.     Ky Trujillo MD   of Medicine  Pulmonary, Critical Care and Sleep Medicine  [facility]  Pager: [phone]              D: 2019   T: 2019   MT:       Name:     WILEY DOWLING   MRN:      [id number]        Account:      [id number]   :      1965           Visit Date:   2019      Document: [id number]

## 2019-07-24 NOTE — PROGRESS NOTES
Completed a split night PSG per provider order.    Preliminary AHI 69.  A final therapeutic PAP pressure was achieved.    Supine REM was not seen on therapeutic pressure.    Patient reports feeling refreshed in AM.

## 2019-07-25 PROBLEM — G47.33 OSA (OBSTRUCTIVE SLEEP APNEA): Status: ACTIVE | Noted: 2019-07-25

## 2019-07-25 NOTE — PROCEDURES
SLEEP STUDY INTERPRETATION  SPLIT NIGHT STUDY      Patient: WILEY SMITH  YOB: 1965  Study Date: 7/23/2019  MRN: [id number]  Referring Provider: Self  Ordering Provider: Ky Trujillo MD    Indications for Polysomnography: The patient is a 53 y old Male who is 6' and weighs 368.0 lbs. His BMI is 50.4, Bath sleepiness scale 7.0 and neck circumference is 47.0 cm. Relevant medical history includes morbid obesity, recent provoked PE, hypertension, depression. A diagnostic polysomnogram was performed to evaluate for sleep apnea. After 114.0 minutes of sleep time the patient exhibited sufficient respiratory events qualifying him for a CPAP trial which was then initiated.    Polysomnogram Data: A full night polysomnogram recorded the standard physiologic parameters including EEG, EOG, EMG, ECG, nasal and oral airflow. Respiratory parameters of chest and abdominal movements were recorded with respiratory inductance plethysmography. Oxygen saturation was recorded by pulse oximetry.  Hypopnea scoring rule used: 1B 4%    Diagnostic PSG  Sleep Architecture: Increased sleep onset latency with frequent sleep disruption resulted in reduced sleep efficiency. REM sleep not seen during the diagnostic portion of the PSG.   The total recording time of the polysomnogram was 202.5 minutes. The total sleep time was 114.0 minutes. Sleep latency was increased at 44.0 minutes with the use of a sleep aid (zolpidem 10 mg). REM sleep not seen. Arousal index was increased at 79.5 arousals per hour. Sleep efficiency was decreased at 56.3%. Wake after sleep onset was 39.0 minutes. The patient spent 68.4% of total sleep time in Stage N1, 26.3% in Stage N2, 5.3% in Stage N3, and 0.0% in REM. Time in REM supine was 0 minutes.    Respiration: Severe obstructive sleep apnea (AHI 73.2, supine .8/hr) with significant sleep associated hypoxemia (donovan SpO2 75%, 22.5 minutes <88%) noted. Loud snoring noted continuously through  the diagnostic study.     Events ? The polysomnogram revealed a presence of 20 obstructive, 0 central, and 0 mixed apneas resulting in an apnea index of 10.5 events per hour. There were 119 obstructive hypopneas and 0 central hypopneas resulting in an obstructive hypopnea index of 62.6 and central hypopnea index of 0 events per hour. The combined apnea/hypopnea index was 73.2 events per hour (central apnea/hypopnea index was 0 events per hour).  The REM AHI could not be evaluated due to lack of REM sleep. The supine AHI was 111.8 events per hour. The RERA index was 4.7 events per hour. The RDI was 77.9 events per hour.    Snoring - was reported as loud and continuous.    Respiratory rate and pattern - was notable for normal respiratory rate and pattern.    Sustained Sleep Associated Hypoventilation - Transcutaneous carbon dioxide monitoring was not used, however significant hypoventilation was not suggested by oximetry.    Sleep Associated Hypoxemia - (Greater than 5 minutes O2 sat at or below 88%) was present. Baseline oxygen saturation was 92.0%. Lowest oxygen saturation was 75.4%. Time spent less than or equal to 88% was 22.5 minutes. Time spent less than or equal to 89% was 26.3 minutes.     Treatment PSG  Sleep Architecture: Sleep architecture and continuity improved during the treatment portion of the study.   At 01:39:55 AM the patient was placed on PAP treatment and was titrated at pressures ranging from CPAP 5 cmH2O up to CPAP 10 cmH2O. The total recording time of the treatment portion of the study was 288.0 minutes. The total sleep time was 248.0 minutes. During the treatment portion of the study the sleep latency was 0.0 minutes. REM latency was 133.0 minutes. Arousal index was normal at 25.9 arousals per hour. Sleep efficiency was normal at 86.1%. Wake after sleep onset was 39.0 minutes. The patient spent 21.2% of total sleep time in Stage N1, 44.0% in Stage N2, 0.0% in Stage N3, and 34.9% in REM. Time  in REM supine was 0 minutes.     Respiration: Adequate correction of CLAIR noted at CPAP 10 cmH2O. REM sleep in supine position not observed on final pressure.     The final pressure was CPAP 10 cmH2O with an AHI of 5.1 events per hour. Time in REM supine on final pressure was - minutes.     This titration was considered adequate (residual AHI with 75% decrease or above constraints without REM?supine sleep at final pressure).    Movement Activity: Elevated periodic limb movement index, not associated with increased arousal frequency noted.     Periodic Limb Movements  o During the diagnostic portion of the study, there were 93 PLMs recorded. The PLM index was 48.9 movements per hour. The PLM Arousal Index was 2.1 per hour.  o During the treatment portion of the study, there were 90 PLMs recorded. The PLM index was 21.8 movements per hour. The PLM Arousal Index was 2.2 per hour.    REM EMG Activity - Excessive transient/sustained muscle activity was not present.    Nocturnal Behavior - Abnormal sleep related behaviors were not noted during/arising out of NREM / REM sleep.     Bruxism - None apparent.    Cardiac Summary: No significant cardiac abnormalities noted.   During the diagnostic portion of the study, the average pulse rate was 70.8 bpm. The minimum pulse rate was 60.9 bpm while the maximum pulse rate was 85.0 bpm.    During the treatment portion of the study, the average pulse rate was 65.5 bpm. The minimum pulse rate was 52.9 bpm while the maximum pulse rate was 111.8 bpm.     Arrhythmias were not noted.      Assessment:     Severe obstructive sleep apnea (AHI 73.2, supine .8/hr) with significant sleep associated hypoxemia (donovan SpO2 75%, 22.5 minutes <88%) noted. Loud snoring noted continuously through the diagnostic study. Adequate correction of CLAIR noted at CPAP 10 cmH2O. REM sleep in supine position not observed on final pressure.    Increased sleep onset latency with frequent sleep disruption  resulted in reduced sleep efficiency. REM sleep not seen during the diagnostic portion of the PSG. Sleep architecture and continuity improved during the treatment portion of the study.    Elevated periodic limb movement index, not associated with increased arousal frequency noted.    No significant cardiac abnormalities noted.    Recommendations:    Treatment of CLAIR with Auto?titrating PAP therapy with a range of 8 cmH2O to 14 cmH2O. Recommend clinical follow up with sleep management team, including review of compliance measures.    Advice regarding the risks of drowsy driving.    Suggest optimizing sleep schedule and avoiding sleep deprivation.    Weight management (BMI > 30).    Pharmacologic therapy should be used for management of restless legs syndrome only if present and clinically indicated and not based on the presence of periodic limb movements alone.    Diagnostic Codes:   Obstructive Sleep Apnea G47.33  Sleep Hypoxemia/Hypoventilation G47.36       Electronically Signed By: Willard Trujillo MD (7/25/19)         Range(%) Time in range (min)   0.0 - 88.0 22.5   0.0 - 89.0 26.3       Stage Min(mm Hg) Max(mm Hg)   Wake - -   NREM(1+2+3+4) - -   REM - -         Range(mmHg) Time in range (min)   55.0 - 100.0 -   Excluded data <20.0 & >65.0 202.5

## 2019-07-25 NOTE — PROGRESS NOTES
Called patient to discuss PSG results. Severe CLAIR with AHI 73 noted. APAP 8-13 cmH2O ordered. Patient will return to clinic in 6-8 weeks after setup to discuss CPAP compliance.     Ky Trujillo

## 2019-07-26 LAB — SLPCOMP: NORMAL

## 2019-07-30 ENCOUNTER — DOCUMENTATION ONLY (OUTPATIENT)
Dept: SLEEP MEDICINE | Facility: CLINIC | Age: 54
End: 2019-07-30
Payer: COMMERCIAL

## 2019-07-30 NOTE — PROGRESS NOTES
Patient was offered choice of vendor and chose [facility].  Patient Delbert Dowling was set up at [place] on July 30, 2019. Patient received a Resmed AirSense 10 Auto. Pressures were set at 8-13 cm H2O.   Patient s ramp is 5 cm H2O for Auto and FLEX/EPR is EPR.  Patient received a Resmed Mask name: AIRFIT N30I  Nasal mask size Medium, heated tubing and heated humidifier.  Patient is enrolled in the STM Program and does not need to meet compliance. Patient has a follow up on TBD with Dr. Trujillo.  DR. Trujillo's schedule is not out past August and he is currently booked up. Pt will call to schedule next week  Piedad Allen

## 2019-08-02 ENCOUNTER — DOCUMENTATION ONLY (OUTPATIENT)
Dept: SLEEP MEDICINE | Facility: CLINIC | Age: 54
End: 2019-08-02

## 2019-08-02 NOTE — PROGRESS NOTES
3 DAY STM VISIT    Diagnostic AHI: 73.2 PSG    Patient contacted for 3 day STM visit  Subjective measures:  Pt states things are going well and has no issues or complaints.  Pt is benefiting from therapy.  Device type: Auto-CPAP  PAP settings from order::  CPAP min 8 cm  H20       CPAP max 13 cm  H20  Mask type:    Nasal Mask     Device settings from machine      Min CPAP 8.0            Max CPAP 13.0    Assessment: Nightly usage, most nights under four hours.  Action plan: Pt to have f/u 14 day STM visit.  Patient has a follow up visit scheduled:   yes within 31-90 days of set up.    Total time spent on remote patient monitoring data analysis and patient contact today:   12 minutes

## 2019-08-13 ENCOUNTER — DOCUMENTATION ONLY (OUTPATIENT)
Dept: SLEEP MEDICINE | Facility: CLINIC | Age: 54
End: 2019-08-13
Payer: COMMERCIAL

## 2019-08-13 NOTE — PROGRESS NOTES
14  DAY STM VISIT    Diagnostic AHI: 73.2 PSG    Subjective measures:   Patient doesn't notice a difference but his wife says he is lot quieter.     Assessment: Pt not meeting objective benchmarks for compliance  Patient meeting subjective benchmarks.     Action plan: Pt to have 30 day STM visit.      Device type: Auto-CPAP    PAP settings: CPAP min 8.0 cm  H20       CPAP max 13.0 cm  H20      95th% pressure 12.6 cm  H20     Mask type:  Nasal Mask    Objective measures: 14 day rolling measures      Compliance  61 %      Leak  13.2 lpm  last  upload      AHI 3.95   last  upload      Average number of minutes 342      Objective measure goal  Compliance   Goal >70%  Leak   Goal < 24 lpm  AHI  Goal < 5  Usage  Goal >240      Total time spent on remote patient monitoring data analysis and patient contact today:   13 minutes

## 2019-08-30 ENCOUNTER — DOCUMENTATION ONLY (OUTPATIENT)
Dept: SLEEP MEDICINE | Facility: CLINIC | Age: 54
End: 2019-08-30
Payer: COMMERCIAL

## 2019-08-30 NOTE — PROGRESS NOTES
30 DAY STM VISIT    Diagnostic AHI: 73.2 PSG    Subjective measures:   Patient stated he switched to a full face mask and he feels like CPAP starting to make a difference.     Assessment: Pt not meeting objective benchmarks for compliance.  Patient meeting subjective benchmarks.  Action plan: pt to have 6 month [facility] visit  Patient has scheduled a follow up visit with Dr. Trujillo on 9/11/2019.   Device type: Auto-CPAP  PAP settings: CPAP min 8.0 cm  H20     CPAP max 13.0 cm  H20    95th% pressure 12.8 cm  H20   Mask type:  Nasal Mask  Objective measures: 14 day rolling measures      Compliance  64 %      Leak  6.32 lpm  last  upload      AHI 7.24   last  upload      Average number of minutes 308      Objective measure goal  Compliance   Goal >70%  Leak   Goal < 24 lpm  AHI  Goal < 5  Usage  Goal >240      Total time spent on remote patient monitoring data analysis and patient contact today:   14 minutes

## 2019-09-06 ENCOUNTER — DOCUMENTATION ONLY (OUTPATIENT)
Dept: SLEEP MEDICINE | Facility: CLINIC | Age: 54
End: 2019-09-06
Payer: COMMERCIAL

## 2019-09-06 NOTE — PROGRESS NOTES
STM Recheck: Patient has met compliance but his AHI is increasing slightly will recheck in two weeks,

## 2019-09-11 ENCOUNTER — OFFICE VISIT (OUTPATIENT)
Dept: SLEEP MEDICINE | Facility: CLINIC | Age: 54
End: 2019-09-11
Payer: COMMERCIAL

## 2019-09-11 VITALS
HEIGHT: 72 IN | HEART RATE: 59 BPM | BODY MASS INDEX: 42.66 KG/M2 | SYSTOLIC BLOOD PRESSURE: 132 MMHG | OXYGEN SATURATION: 97 % | DIASTOLIC BLOOD PRESSURE: 80 MMHG | WEIGHT: 315 LBS

## 2019-09-11 DIAGNOSIS — G47.33 OSA (OBSTRUCTIVE SLEEP APNEA): Primary | ICD-10-CM

## 2019-09-11 PROCEDURE — 99213 OFFICE O/P EST LOW 20 MIN: CPT | Performed by: INTERNAL MEDICINE

## 2019-09-11 ASSESSMENT — MIFFLIN-ST. JEOR: SCORE: 2548.17

## 2019-09-11 NOTE — PATIENT INSTRUCTIONS
Your Body mass index is 49.64 kg/m .  Weight management is a personal decision.  If you are interested in exploring weight loss strategies, the following discussion covers the approaches that may be successful. Body mass index (BMI) is one way to tell whether you are at a healthy weight, overweight, or obese. It measures your weight in relation to your height.  A BMI of 18.5 to 24.9 is in the healthy range. A person with a BMI of 25 to 29.9 is considered overweight, and someone with a BMI of 30 or greater is considered obese. More than two-thirds of American adults are considered overweight or obese.  Being overweight or obese increases the risk for further weight gain. Excess weight may lead to heart disease and diabetes.  Creating and following plans for healthy eating and physical activity may help you improve your health.  Weight control is part of healthy lifestyle and includes exercise, emotional health, and healthy eating habits. Careful eating habits lifelong are the mainstay of weight control. Though there are significant health benefits from weight loss, long-term weight loss with diet alone may be very difficult to achieve- studies show long-term success with dietary management in less than 10% of people. Attaining a healthy weight may be especially difficult to achieve in those with severe obesity. In some cases, medications, devices and surgical management might be considered.  What can you do?  If you are overweight or obese and are interested in methods for weight loss, you should discuss this with your provider.     Consider reducing daily calorie intake by 500 calories.     Keep a food journal.     Avoiding skipping meals, consider cutting portions instead.    Diet combined with exercise helps maintain muscle while optimizing fat loss. Strength training is particularly important for building and maintaining muscle mass. Exercise helps reduce stress, increase energy, and improves fitness.  Increasing exercise without diet control, however, may not burn enough calories to loose weight.       Start walking three days a week 10-20 minutes at a time    Work towards walking thirty minutes five days a week     Eventually, increase the speed of your walking for 1-2 minutes at time    In addition, we recommend that you review healthy lifestyles and methods for weight loss available through the National Institutes of Health patient information sites:  http://win.niddk.nih.gov/publications/index.htm    And look into health and wellness programs that may be available through your health insurance provider, employer, local community center, or raquel club.    Weight management plan: Patient was referred to their PCP to discuss a diet and exercise plan.

## 2019-09-11 NOTE — NURSING NOTE
Does Delbert have a CPAP/Bipap?  Yes             Type of mask: full face     FM[facility]: [facility] [phone]    [email or web address]     Weight management plan: Patient was referred to their PCP to discuss a diet and exercise plan.

## 2019-09-11 NOTE — PROGRESS NOTES
SLEEP MEDICINE CLINIC NOTE   [facility]  Delbert Dowling 53 year old male  : 1965  MRN: [id number]      PRIMARY CARE PROVIDER: Lissa Gamez    Visit Date:   2019      REASON FOR VISIT:  Review of CPAP compliance.      HISTORY OF PRESENT ILLNESS:  The patient is a very pleasant 53-year-old gentleman with history of morbid obesity, provoked PE, hypertension, and depression who was initially seen in our clinic approximately 2 months ago for evaluation of sleep-related breathing disorder given concern for snoring and witnessed apnea.  The patient was advised to undergo an overnight polysomnography and was subsequently set up with an auto CPAP.  He comes in today to review the results of the PSG and to review the CPAP compliance.      The patient reports that he has been using the CPAP regularly.  More recently, he switched from a nasal mask to aa full facemask, which has improved his CPAP tolerance quite significantly.  He has noticed some improvement in his energy level and a reduction in his snoring.  He does not take naps over the weekends anymore.  However, he does not think that this is significant improvement in how he feels compared to before the PSG.      Besides this, no significant changes have occurred to his sleep or health since he was last seen in our clinic.  He reports going to bed around 9:00 p.m. and waking up with the help of an alarm at 5:00 a.m.  He does not have any middle of the night awakenings.      ASSESSMENT AND PLAN:  The patient's PSG performed on 2019 was reviewed.  This was a split-night study with the initial half being diagnostic and the second half of a trial with CPAP.  During the diagnostic PSG, the patient's total sleep time was 114 minutes with a sleep efficiency of 56%.  No REM sleep was noted.  The sleep was significantly disrupted with an arousal index of 79.5 events per hour.  His apnea-hypopnea index was 73.2 events per hour, and  limb movement index was 48.9 events per hour.  Average oxygen saturation was 92% with donovan oxygen saturation of 75% and 22 minutes spent below 88%.  During the treatment portion of the PSG, the CPAP was titrated from 5-12 cm of water.  His AHI was 7.  Arousal index was 25, and limb movement index was 24 events per hour.  Total sleep time was 248 minutes with sleep efficiency of 86% with significant REM rebound noted.  Donovan oxygen saturation was 84% with less than 1 minute spent below 88%.  Loud continuous snoring was noted during the diagnostic portion, which resolved with CPAP.  No cardiac abnormalities were noted.      Overall, the patient has very severe obstructive sleep apnea, which responded well to CPAP.  The patient has been using an auto CPAP with a range of 8-13 cm of water.  His CPAP compliance over the last 30 days shows that the device was used 29 out of those 30 days with 20 days of over 4 hours of use, an average daily use of 5 hours and 46 minutes.  Median pressure is 10.6 cm; 95th percentile pressure is 13 cm.  Remnant AHI is 6.6 with obstructive apneas being the most common remnant events.  His median leak is 0.4 liters per minute.  More recently, within the last week or so, it was noted that his CPAP compliance was much higher.  This has been attributed to change to a full-face mask, which the patient acknowledges is more comfortable.      I will change his CPAP settings from current 8-13 to 8-16 cm of water given significant remnant obstructive events.  The patient was advised to use the CPAP every time he goes to sleep, including during naps, if any.  He was advised not to drive or operate heavy machinery if drowsy or sleepy.  He was counseled regarding the importance of weight loss.  He was suggested to change his CPAP supplies every 3-6 months as indicated.  He would return to clinic on an annual basis.         I spent a total of 25 minutes face to face with Delbert Dowling during today's office  visit. Over 50% of this time was spent counseling the patient and/or coordinating care regarding their sleep disorder.     Ky Trujillo MD   of Medicine  Pulmonary, Critical Care and Sleep Medicine  [facility]  Pager: [phone]               D: 2019   T: 2019   MT: JACKIE      Name:     WILEY SMITH   MRN:      [id number]        Account:      [id number]   :      1965           Visit Date:   2019      Document: [id number]

## 2019-09-20 ENCOUNTER — DOCUMENTATION ONLY (OUTPATIENT)
Dept: SLEEP MEDICINE | Facility: CLINIC | Age: 54
End: 2019-09-20
Payer: COMMERCIAL

## 2019-11-05 ENCOUNTER — MEDICAL CORRESPONDENCE (OUTPATIENT)
Dept: HEALTH INFORMATION MANAGEMENT | Facility: CLINIC | Age: 54
End: 2019-11-05

## 2020-01-28 ENCOUNTER — DOCUMENTATION ONLY (OUTPATIENT)
Dept: SLEEP MEDICINE | Facility: CLINIC | Age: 55
End: 2020-01-28
Payer: COMMERCIAL

## 2020-01-28 NOTE — PROGRESS NOTES
6 month [facility] Recheck Visit     Diagnostic AHI: 73.2  PSG    Data only recheck     Assessment: Pt meeting objective benchmarks.  Patient compliance is 79% the last 180 days   Action plan:   pt to follow up per provider request.      Device type: Auto-CPAP  PAP settings: CPAP min 8.0 cm  H20     CPAP max 16.0 cm  H20    95th% pressure 14.4 cm  H20   Objective measures: 14 day rolling measures      Compliance  85 %      Leak  37 lpm  last  upload      AHI 2.88   last  upload      Average number of minutes 399      Objective measure goal  Compliance   Goal >70%  Leak   Goal < 24 lpm  AHI  Goal < 5  Usage  Goal >240    Total time spent on accessing, reviewing and interpreting remote patient PAP therapy data:   8 minutes      Total time spent with direct patient communication :   4 minutes

## 2020-02-06 ENCOUNTER — MYC MEDICAL ADVICE (OUTPATIENT)
Dept: FAMILY MEDICINE | Facility: CLINIC | Age: 55
End: 2020-02-06

## 2020-02-06 DIAGNOSIS — F33.9 EPISODE OF RECURRENT MAJOR DEPRESSIVE DISORDER, UNSPECIFIED DEPRESSION EPISODE SEVERITY (H): ICD-10-CM

## 2020-02-06 RX ORDER — BUPROPION HYDROCHLORIDE 150 MG/1
150 TABLET ORAL DAILY
Qty: 30 TABLET | Refills: 0 | Status: SHIPPED | OUTPATIENT
Start: 2020-02-06 | End: 2020-02-19

## 2020-02-06 RX ORDER — CITALOPRAM HYDROBROMIDE 40 MG/1
40 TABLET ORAL DAILY
Qty: 30 TABLET | Refills: 0 | Status: SHIPPED | OUTPATIENT
Start: 2020-02-06 | End: 2020-02-19

## 2020-02-06 NOTE — TELEPHONE ENCOUNTER
Pending Prescriptions:                       Disp   Refills    citalopram (CELEXA) 40 MG tablet          90 tab*1            Sig: Take 1 tablet (40 mg) by mouth daily    buPROPion (WELLBUTRIN XL) 150 MG 24 hr ta*90 tab*1            Sig: Take 1 tablet (150 mg) by mouth daily    PHQ-9 SCORE 3/13/2019 3/21/2019 4/5/2019   PHQ-9 Total Score MyChart - 0 0   PHQ-9 Total Score 0 0 0     Routing refill request to provider for review/approval because:  Labs not current:  PHQ9  LOV 4/5/2019    Destiney Lizama, MSN, RN

## 2020-02-19 RX ORDER — CITALOPRAM HYDROBROMIDE 40 MG/1
40 TABLET ORAL DAILY
Qty: 90 TABLET | Refills: 0 | Status: SHIPPED | OUTPATIENT
Start: 2020-02-19 | End: 2020-04-07

## 2020-02-19 RX ORDER — BUPROPION HYDROCHLORIDE 150 MG/1
150 TABLET ORAL DAILY
Qty: 90 TABLET | Refills: 0 | Status: SHIPPED | OUTPATIENT
Start: 2020-02-19 | End: 2020-04-07

## 2020-02-19 ASSESSMENT — ANXIETY QUESTIONNAIRES
3. WORRYING TOO MUCH ABOUT DIFFERENT THINGS: NOT AT ALL
IF YOU CHECKED OFF ANY PROBLEMS ON THIS QUESTIONNAIRE, HOW DIFFICULT HAVE THESE PROBLEMS MADE IT FOR YOU TO DO YOUR WORK, TAKE CARE OF THINGS AT HOME, OR GET ALONG WITH OTHER PEOPLE: NOT DIFFICULT AT ALL
4. TROUBLE RELAXING: NOT AT ALL
5. BEING SO RESTLESS THAT IT IS HARD TO SIT STILL: NOT AT ALL
7. FEELING AFRAID AS IF SOMETHING AWFUL MIGHT HAPPEN: NOT AT ALL
1. FEELING NERVOUS, ANXIOUS, OR ON EDGE: NOT AT ALL
2. NOT BEING ABLE TO STOP OR CONTROL WORRYING: NOT AT ALL
GAD7 TOTAL SCORE: 0
6. BECOMING EASILY ANNOYED OR IRRITABLE: NOT AT ALL

## 2020-02-19 ASSESSMENT — PATIENT HEALTH QUESTIONNAIRE - PHQ9: SUM OF ALL RESPONSES TO PHQ QUESTIONS 1-9: 2

## 2020-02-19 NOTE — TELEPHONE ENCOUNTER
Received a fax from [facility].  Note:  Pt has prescription benefit which entitles them to cheaper costs when 90 days is sent.  Provider, are you willing to send #90 for two medications instead of the #30 that was sent?    Please review and advise on further action.

## 2020-02-20 ASSESSMENT — ANXIETY QUESTIONNAIRES: GAD7 TOTAL SCORE: 0

## 2020-03-11 ENCOUNTER — HEALTH MAINTENANCE LETTER (OUTPATIENT)
Age: 55
End: 2020-03-11

## 2020-04-06 NOTE — PROGRESS NOTES
Subjective     Delbert Dowling is a 54 year old male who is being evaluated via a billable telephone visit.      The patient has been notified of following:         Patient has given verbal consent for Telephone visit?  Yes    Delbert Dowling complains of   Chief Complaint   Patient presents with     Refill Request       ALLERGIES  Eliquis [apixaban] and Lisinopril    Hypertension Follow-up      Do you check your blood pressure regularly outside of the clinic? Yes     Are you following a low salt diet? Yes    Are your blood pressures ever more than 140 on the top number (systolic) OR more   than 90 on the bottom number (diastolic), for example 140/90? No   Down 19 LBS in 3 weeks- started Weight Watchers.  Walks at work-     Ankle surgery- resolved well.       Depression Followup    How are you doing with your depression since your last visit? No change    Are you having other symptoms that might be associated with depression? Yes:  Covid sress only- manageable    Have you had a significant life event?  No     Are you feeling anxious or having panic attacks?   No    Do you have any concerns with your use of alcohol or other drugs? No    Social History     Tobacco Use     Smoking status: Former Smoker     Packs/day: 1.00     Years: 20.00     Pack years: 20.00     Smokeless tobacco: Never Used     Tobacco comment: quit 13 years    Substance Use Topics     Alcohol use: Not Currently     Comment: once every 3 months      Drug use: No     Comment: none      PHQ 4/5/2019 2/19/2020 4/7/2020   PHQ-9 Total Score 0 2 0   Q9: Thoughts of better off dead/self-harm past 2 weeks Not at all Not at all Not at all     MYKE-7 SCORE 4/5/2019 2/19/2020 4/7/2020   Total Score 0 (minimal anxiety) - -   Total Score 0 0 0           Suicide Assessment Five-step Evaluation and Treatment (SAFE-T)      How many servings of fruits and vegetables do you eat daily?  4 or more    On average, how many sweetened beverages do you drink each day (Examples:  "soda, juice, sweet tea, etc.  Do NOT count diet or artificially sweetened beverages)?   0    How many days per week do you exercise enough to make your heart beat faster? 3 or less    How many minutes a day do you exercise enough to make your heart beat faster? 10 - 19    How many days per week do you miss taking your medication? 0                 Reviewed and updated as needed this visit by Provider         Review of Systems   ROS COMP: Constitutional, HEENT, cardiovascular, pulmonary, gi and gu systems are negative, except as otherwise noted.       Objective   Reported vitals:  There were no vitals taken for this visit.   healthy, alert and no distress  Psych: Alert and oriented times 3; coherent speech, normal   rate and volume, able to articulate logical thoughts, able   to abstract reason, no tangential thoughts, no hallucinations   or delusions  His affect is normal  Nor     Diagnostic Test Results:  Labs reviewed in Epic        Assessment/Plan:  1. Essential hypertension    - atenolol (TENORMIN) 100 MG tablet; Take 1 tablet (100 mg) by mouth daily  Dispense: 90 tablet; Refill: 1  - hydrochlorothiazide (HYDRODIURIL) 25 MG tablet; TAKE 1 TAB BY MOUTH ONCE DAILY IN THE MORNING  Dispense: 90 tablet; Refill: 1    2. Episode of recurrent major depressive disorder, unspecified depression episode severity (H)    - buPROPion (WELLBUTRIN XL) 150 MG 24 hr tablet; Take 1 tablet (150 mg) by mouth daily  Dispense: 90 tablet; Refill: 1  - citalopram (CELEXA) 40 MG tablet; Take 1 tablet (40 mg) by mouth daily  Dispense: 90 tablet; Refill: 1    3. Morbid obesity (H)      4. Other acute pulmonary embolism without acute cor pulmonale (H)  No breathing concerns.       Patient is stable for home plan.  Continue plan.    \"Stay at Home\" cares.   Refills done.     Plan changes: none  Labs/Imaging: none- will order post-covid    Post-Covid (yes/no): yes         Return in about 6 months (around 10/7/2020) for re-check office " visit.      Phone call duration:  6 minutes    NANDO Ellison CNP

## 2020-04-07 ENCOUNTER — VIRTUAL VISIT (OUTPATIENT)
Dept: FAMILY MEDICINE | Facility: OTHER | Age: 55
End: 2020-04-07
Payer: COMMERCIAL

## 2020-04-07 DIAGNOSIS — F33.9 EPISODE OF RECURRENT MAJOR DEPRESSIVE DISORDER, UNSPECIFIED DEPRESSION EPISODE SEVERITY (H): ICD-10-CM

## 2020-04-07 DIAGNOSIS — I10 ESSENTIAL HYPERTENSION: ICD-10-CM

## 2020-04-07 DIAGNOSIS — Z13.220 SCREENING FOR LIPOID DISORDERS: ICD-10-CM

## 2020-04-07 DIAGNOSIS — Z13.29 SCREENING FOR THYROID DISORDER: Primary | ICD-10-CM

## 2020-04-07 DIAGNOSIS — Z12.5 SCREENING FOR PROSTATE CANCER: ICD-10-CM

## 2020-04-07 DIAGNOSIS — I26.99 OTHER ACUTE PULMONARY EMBOLISM WITHOUT ACUTE COR PULMONALE (H): ICD-10-CM

## 2020-04-07 DIAGNOSIS — E66.01 MORBID OBESITY (H): ICD-10-CM

## 2020-04-07 PROCEDURE — 96127 BRIEF EMOTIONAL/BEHAV ASSMT: CPT | Performed by: NURSE PRACTITIONER

## 2020-04-07 PROCEDURE — 99214 OFFICE O/P EST MOD 30 MIN: CPT | Mod: TEL | Performed by: NURSE PRACTITIONER

## 2020-04-07 RX ORDER — BUPROPION HYDROCHLORIDE 150 MG/1
150 TABLET ORAL DAILY
Qty: 90 TABLET | Refills: 1 | Status: SHIPPED | OUTPATIENT
Start: 2020-04-07 | End: 2020-04-29

## 2020-04-07 RX ORDER — CITALOPRAM HYDROBROMIDE 40 MG/1
40 TABLET ORAL DAILY
Qty: 90 TABLET | Refills: 1 | Status: SHIPPED | OUTPATIENT
Start: 2020-04-07 | End: 2020-04-29

## 2020-04-07 RX ORDER — ATENOLOL 100 MG/1
100 TABLET ORAL DAILY
Qty: 90 TABLET | Refills: 1 | Status: SHIPPED | OUTPATIENT
Start: 2020-04-07 | End: 2020-10-30

## 2020-04-07 RX ORDER — HYDROCHLOROTHIAZIDE 25 MG/1
TABLET ORAL
Qty: 90 TABLET | Refills: 1 | Status: SHIPPED | OUTPATIENT
Start: 2020-04-07 | End: 2020-10-30

## 2020-04-07 ASSESSMENT — ANXIETY QUESTIONNAIRES
6. BECOMING EASILY ANNOYED OR IRRITABLE: NOT AT ALL
3. WORRYING TOO MUCH ABOUT DIFFERENT THINGS: NOT AT ALL
7. FEELING AFRAID AS IF SOMETHING AWFUL MIGHT HAPPEN: NOT AT ALL
GAD7 TOTAL SCORE: 0
1. FEELING NERVOUS, ANXIOUS, OR ON EDGE: NOT AT ALL
2. NOT BEING ABLE TO STOP OR CONTROL WORRYING: NOT AT ALL
5. BEING SO RESTLESS THAT IT IS HARD TO SIT STILL: NOT AT ALL
IF YOU CHECKED OFF ANY PROBLEMS ON THIS QUESTIONNAIRE, HOW DIFFICULT HAVE THESE PROBLEMS MADE IT FOR YOU TO DO YOUR WORK, TAKE CARE OF THINGS AT HOME, OR GET ALONG WITH OTHER PEOPLE: NOT DIFFICULT AT ALL

## 2020-04-07 ASSESSMENT — PATIENT HEALTH QUESTIONNAIRE - PHQ9
5. POOR APPETITE OR OVEREATING: NOT AT ALL
SUM OF ALL RESPONSES TO PHQ QUESTIONS 1-9: 0

## 2020-04-08 ASSESSMENT — ANXIETY QUESTIONNAIRES: GAD7 TOTAL SCORE: 0

## 2020-04-28 DIAGNOSIS — F33.9 EPISODE OF RECURRENT MAJOR DEPRESSIVE DISORDER, UNSPECIFIED DEPRESSION EPISODE SEVERITY (H): ICD-10-CM

## 2020-04-28 NOTE — TELEPHONE ENCOUNTER
Pending Prescriptions:                       Disp   Refills    citalopram (CELEXA) 40 MG tablet          90 tab*1            Sig: Take 1 tablet (40 mg) by mouth daily    buPROPion (WELLBUTRIN XL) 150 MG 24 hr ta*90 tab*1            Sig: Take 1 tablet (150 mg) by mouth daily    Perla Coe, CMA

## 2020-04-29 RX ORDER — CITALOPRAM HYDROBROMIDE 40 MG/1
40 TABLET ORAL DAILY
Qty: 90 TABLET | Refills: 1 | Status: SHIPPED | OUTPATIENT
Start: 2020-04-29 | End: 2020-10-30

## 2020-04-29 RX ORDER — BUPROPION HYDROCHLORIDE 150 MG/1
150 TABLET ORAL DAILY
Qty: 90 TABLET | Refills: 1 | Status: SHIPPED | OUTPATIENT
Start: 2020-04-29 | End: 2020-10-30

## 2020-09-09 ENCOUNTER — VIRTUAL VISIT (OUTPATIENT)
Dept: SLEEP MEDICINE | Facility: CLINIC | Age: 55
End: 2020-09-09
Payer: COMMERCIAL

## 2020-09-09 DIAGNOSIS — G47.33 OSA (OBSTRUCTIVE SLEEP APNEA): Primary | ICD-10-CM

## 2020-09-09 PROCEDURE — 99212 OFFICE O/P EST SF 10 MIN: CPT | Mod: TEL | Performed by: PHYSICIAN ASSISTANT

## 2020-09-09 NOTE — PROGRESS NOTES
"Delbert Dowling is a 54 year old male who is being evaluated via a billable telephone visit.      The patient has been notified of following:     \"This telephone visit will be conducted via a call between you and your physician/provider. We have found that certain health care needs can be provided without the need for a physical exam.  This service lets us provide the care you need with a short phone conversation.  If a prescription is necessary we can send it directly to your pharmacy.  If lab work is needed we can place an order for that and you can then stop by our lab to have the test done at a later time.    Telephone visits are billed at different rates depending on your insurance coverage. During this emergency period, for some insurers they may be billed the same as an in-person visit.  Please reach out to your insurance provider with any questions.    If during the course of the call the physician/provider feels a telephone visit is not appropriate, you will not be charged for this service.\"    Patient has given verbal consent for Telephone visit?  Yes    What phone number would you like to be contacted at? [phone]     How would you like to obtain your AVS? MyChart    Phone call duration: 10 minutes    Harvey Milian PA-C     Does Delbert have a CPAP/Bipap?  Yes     Type of mask: Full face     G: [place] [phone]    [email or web address]    [place] Sleep Scale: 4    Obstructive Sleep Apnea - PAP Follow-Up Visit:    Chief Complaint   Patient presents with     CPAP Follow Up       Delbert Dowling comes in today for follow-up of their severe sleep apnea, managed with CPAP.     No specialty comments available.    Overall, he rates the experience with PAP as 9 (0 poor, 10 great). The mask is comfortable.    The mask is leaking at his chin.  The mask is leaking 1 nights per week.  He is not snoring with the mask on. He is not having gasp arousals.  He is not " having significant oral/nasal dryness. The pressure is comfortable.     His PAP interface is Full Face Mask.    Bedtime is typically 10. Usually it takes about 10 minutes to fall asleep with the mask on. Wake time is typically 6.  Patient is using PAP therapy 8 hours per night. The patient is usually getting 8 hours of sleep per night.    He does feel rested in the morning.    Total score - [place]: 7 (9/11/2019 10:00 AM)      No data recorded    ResMed   CPAP  cmH2O 30 day usage data:  93% of days with > 4 hours of use. 0/30 days with no use.   Average use 470 minutes per day.   95%ile Leak 32.32 L/min.   AHI 2.84 events per hour.     Auto-PAP 8.0 - 16.0 cmH2O 30 day usage data:    93% of days with > 4 hours of use. 0/30 days with no use.   Average use 470 minutes per day.   95%ile Leak 32.32 L/min.   CPAP 95% pressure 13.7 cm.   AHI 2.84 events per hour.         Past medical/surgical history, family history, social history, medications and allergies were reviewed.      Problem List:  Patient Active Problem List    Diagnosis Date Noted     CLAIR (obstructive sleep apnea) 07/25/2019     Priority: Medium     7/23/2019 [facility] Sleep Study (368.0 lbs) - AHI 73.2, RDI 77.9, Supine .8, REM AHI -, Low O2% 75.4%, Time Spent ?88% 22.5, Time Spent ?89% 26.3. Treatment was titrated to a pressure of CPAP 10 with an AHI 5.1. Time spent in REM supine at this pressure was 0 minutes.       S/P ORIF (open reduction internal fixation) fracture 04/03/2019     Priority: Medium     left distal fibula by Dr. Amrit Rivera on 3/22/19       Pulmonary emboli (H) 03/29/2019     Priority: Medium     Depression, major 03/28/2019     Priority: Medium     Disorder of nasal sinus 03/28/2019     Priority: Medium     Overview:   Testing showed decongestants caused too much drying and secondary infections, since stopping that med the sinus sx have been less.       Skin tag 03/28/2019     Priority: Medium     Overview:   Neck area        Acute pulmonary embolism (H) 03/28/2019     Priority: Medium     Closed fracture of posterior malleolus of left tibia with routine healing 03/20/2019     Priority: Medium     Closed displaced fracture of lateral malleolus of left fibula, initial encounter 03/20/2019     Priority: Medium     Closed fracture of left tibia and fibula, initial encounter 03/13/2019     Priority: Medium     Morbid obesity (H) 05/02/2018     Priority: Medium     Snoring 05/02/2018     Priority: Medium     Episode of recurrent major depressive disorder, unspecified depression episode severity (H) 05/01/2018     Priority: Medium     Essential hypertension 05/01/2018     Priority: Medium        There were no vitals taken for this visit.    Impression/Plan:     Severe Sleep apnea. He is   Tolerating PAP very well, no concerns at this time. Purchasing supplies online. Daytime symptoms are improved.       Delbert Dowling will follow up in about 1 year(s).     Ten minutes spent with patient, all of which were spent face-to-face counseling, consulting, coordinating plan of care.      CC:  Lissa Gamez,

## 2020-10-21 ENCOUNTER — TELEPHONE (OUTPATIENT)
Dept: FAMILY MEDICINE | Facility: CLINIC | Age: 55
End: 2020-10-21

## 2020-10-21 NOTE — TELEPHONE ENCOUNTER
Summary:    Patient is due/failing the following:   LDL, CMP, PSA, TSH, PHQ9 and PHYSICAL    Action needed:   Patient needs office visit for Physical. and Patient needs fasting lab only appointment    Type of outreach:    Phone, spoke to patient.  patient scheduled     Questions for provider review:    None                                                                                                                                    Perla Coe CMA       Chart routed to Care Team .        Panel Management Review      Patient has the following on his problem list:     Depression / Dysthymia review    Measure:  Needs PHQ-9 score of 4 or less during index window.  Administer PHQ-9 and if score is 5 or more, send encounter to provider for next steps.      PHQ-9 SCORE 4/5/2019 2/19/2020 4/7/2020   PHQ-9 Total Score MyChart 0 - -   PHQ-9 Total Score 0 2 0       If PHQ-9 recheck is 5 or more, route to provider for next steps.    Patient is due for:  PHQ9    Hypertension   Last three blood pressure readings:  BP Readings from Last 3 Encounters:   09/11/19 132/80   07/23/19 122/70   05/01/19 111/62     Blood pressure: Passed    HTN Guidelines:  Less than 140/90      Composite cancer screening  Chart review shows that this patient is due/due soon for the following None

## 2020-10-29 ASSESSMENT — ENCOUNTER SYMPTOMS
NERVOUS/ANXIOUS: 0
HEMATOCHEZIA: 0
PALPITATIONS: 0
MYALGIAS: 0
SORE THROAT: 0
NAUSEA: 0
COUGH: 0
CHILLS: 0
DIARRHEA: 0
ABDOMINAL PAIN: 0
FREQUENCY: 0
SHORTNESS OF BREATH: 0
PARESTHESIAS: 0
HEARTBURN: 0
JOINT SWELLING: 0
ARTHRALGIAS: 0
DIZZINESS: 0
HEMATURIA: 0
WEAKNESS: 0
EYE PAIN: 0
HEADACHES: 0
FEVER: 0
DYSURIA: 0
CONSTIPATION: 0

## 2020-10-29 NOTE — PROGRESS NOTES
[facility]ba  SUBJECTIVE:   CC: Delbert Dowling is an 54 year old male who presents for preventative health visit.       Patient has been advised of split billing requirements and indicates understanding: Yes  Healthy Habits:     Getting at least 3 servings of Calcium per day:  Yes    Bi-annual eye exam:  Yes    Dental care twice a year:  Yes    Sleep apnea or symptoms of sleep apnea:  Sleep apnea    Diet:  Regular (no restrictions)    Frequency of exercise:  1 day/week    Duration of exercise:  N/A    Taking medications regularly:  No    Medication side effects:  None    PHQ-2 Total Score: 0    Additional concerns today:  No    Weight gain with Covid.     BP- stable.   No CP, no SOB. No edema.    Is starting WW flynn and food tracking.       Declines vaccines.     Due for labs, med refills.     Mood stable.               Today's PHQ-2 Score:   PHQ-2 ( 1999 Pfizer) 10/29/2020   Q1: Little interest or pleasure in doing things 0   Q2: Feeling down, depressed or hopeless 0   PHQ-2 Score 0   Q1: Little interest or pleasure in doing things Not at all   Q2: Feeling down, depressed or hopeless Not at all   PHQ-2 Score 0       Abuse: Current or Past(Physical, Sexual or Emotional)- No  Do you feel safe in your environment? Yes        Social History     Tobacco Use     Smoking status: Former Smoker     Packs/day: 1.00     Years: 20.00     Pack years: 20.00     Smokeless tobacco: Never Used     Tobacco comment: quit 13 years    Substance Use Topics     Alcohol use: Not Currently     Comment: once every 3 months          Alcohol Use 10/29/2020   Prescreen: >3 drinks/day or >7 drinks/week? No   Prescreen: >3 drinks/day or >7 drinks/week? -       Last PSA:   PSA   Date Value Ref Range Status   10/30/2020 0.13 0 - 4 ug/L Final     Comment:     Assay Method:  Chemiluminescence using Siemens Vista analyzer       Reviewed orders with patient. Reviewed health maintenance and updated orders accordingly - Yes      Reviewed and updated as needed this  "visit by clinical staff  Tobacco  Allergies  Meds              Reviewed and updated as needed this visit by Provider                    Review of Systems   Constitutional: Negative for chills and fever.   HENT: Negative for congestion, ear pain, hearing loss and sore throat.    Eyes: Negative for pain and visual disturbance.   Respiratory: Negative for cough and shortness of breath.    Cardiovascular: Negative for chest pain, palpitations and peripheral edema.   Gastrointestinal: Negative for abdominal pain, constipation, diarrhea, heartburn, hematochezia and nausea.   Genitourinary: Negative for discharge, dysuria, frequency, genital sores, hematuria, impotence and urgency.   Musculoskeletal: Negative for arthralgias, joint swelling and myalgias.   Skin: Negative for rash.   Neurological: Negative for dizziness, weakness, headaches and paresthesias.   Psychiatric/Behavioral: Negative for mood changes. The patient is not nervous/anxious.          OBJECTIVE:   /84   Pulse 66   Temp 97.8  F (36.6  C)   Resp 16   Ht 1.803 m (5' 11\")   Wt (!) 164.7 kg (363 lb)   BMI 50.63 kg/m      Physical Exam  GENERAL: healthy, alert and no distress  EYES: Eyes grossly normal to inspection, PERRL and conjunctivae and sclerae normal  HENT: ear canals and TM's normal, nose and mouth without ulcers or lesions  NECK: no adenopathy, no asymmetry, masses, or scars and thyroid normal to palpation  RESP: lungs clear to auscultation - no rales, rhonchi or wheezes  CV: regular rate and rhythm, normal S1 S2, no S3 or S4, no murmur, click or rub, no peripheral edema and peripheral pulses strong  ABDOMEN: soft, nontender, no hepatosplenomegaly, no masses and bowel sounds normal   (male): no hernias  RECTAL: deferred  MS: no gross musculoskeletal defects noted, no edema  SKIN: no suspicious lesions or rashes  NEURO: Normal strength and tone, mentation intact and speech normal  PSYCH: mentation appears normal, affect " normal/bright    Diagnostic Test Results:  Labs reviewed in Epic  Results for orders placed or performed in visit on 10/30/20   Lipid panel reflex to direct LDL Fasting     Status: None   Result Value Ref Range    Cholesterol 146 <200 mg/dL    Triglycerides 147 <150 mg/dL    HDL Cholesterol 53 >39 mg/dL    LDL Cholesterol Calculated 64 <100 mg/dL    Non HDL Cholesterol 93 <130 mg/dL   Comprehensive metabolic panel     Status: Abnormal   Result Value Ref Range    Sodium 139 133 - 144 mmol/L    Potassium 4.0 3.4 - 5.3 mmol/L    Chloride 105 94 - 109 mmol/L    Carbon Dioxide 29 20 - 32 mmol/L    Anion Gap 5 3 - 14 mmol/L    Glucose 117 (H) 70 - 99 mg/dL    Urea Nitrogen 16 7 - 30 mg/dL    Creatinine 0.78 0.66 - 1.25 mg/dL    GFR Estimate >90 >60 mL/min/[1.73_m2]    GFR Estimate If Black >90 >60 mL/min/[1.73_m2]    Calcium 8.8 8.5 - 10.1 mg/dL    Bilirubin Total 0.4 0.2 - 1.3 mg/dL    Albumin 3.6 3.4 - 5.0 g/dL    Protein Total 6.9 6.8 - 8.8 g/dL    Alkaline Phosphatase 52 40 - 150 U/L    ALT 27 0 - 70 U/L    AST 14 0 - 45 U/L   Prostate spec antigen screen     Status: None   Result Value Ref Range    PSA 0.13 0 - 4 ug/L   TSH with free T4 reflex     Status: None   Result Value Ref Range    TSH 1.37 0.40 - 4.00 mU/L   Hemoglobin     Status: None   Result Value Ref Range    Hemoglobin 14.6 13.3 - 17.7 g/dL   Hemoglobin A1c     Status: None   Result Value Ref Range    Hemoglobin A1C 5.4 0 - 5.6 %       ASSESSMENT/PLAN:       ICD-10-CM    1. Routine general medical examination at a health care facility  Z00.00 Hepatitis C Screen Reflex to HCV RNA Quant and Genotype     Hemoglobin     Hemoglobin A1c   2. Need for hepatitis C screening test  Z11.59 Hepatitis C Screen Reflex to HCV RNA Quant and Genotype   3. Morbid obesity (H)  E66.01 OFFICE/OUTPT VISIT,EST,LEVL IV   4. Essential hypertension  I10 atenolol (TENORMIN) 100 MG tablet     hydrochlorothiazide (HYDRODIURIL) 25 MG tablet     Comprehensive metabolic panel      "OFFICE/OUTPT VISIT,EST,LEVL IV   5. Episode of recurrent major depressive disorder, unspecified depression episode severity (H)  F33.9 buPROPion (WELLBUTRIN XL) 150 MG 24 hr tablet     citalopram (CELEXA) 40 MG tablet     OFFICE/OUTPT VISIT,EST,LEVL IV   6. Screening for lipoid disorders  Z13.220 Lipid panel reflex to direct LDL Fasting   7. Screening for prostate cancer  Z12.5 Prostate spec antigen screen   8. Screening for thyroid disorder  Z13.29 TSH with free T4 reflex       Patient has been advised of split billing requirements and indicates understanding: Yes  COUNSELING:   Reviewed preventive health counseling, as reflected in patient instructions       Regular exercise       Healthy diet/nutrition       Hearing screening       Immunizations    Declined: Influenza                  Consider Hep C screening for patients born between 1945 and 1965       Colon cancer screening       Prostate cancer screening       Discussed mood.  Patient feels medication dosages stable.  Weight loss discussion at length today.  Blood pressure stable.  Updating medications on laboratory work today.  Screen for diabetes.    Estimated body mass index is 50.63 kg/m  as calculated from the following:    Height as of this encounter: 1.803 m (5' 11\").    Weight as of this encounter: 164.7 kg (363 lb).     Weight management plan: Discussed healthy diet and exercise guidelines    He reports that he has quit smoking. He has a 20.00 pack-year smoking history. He has never used smokeless tobacco.      Counseling Resources:  ATP IV Guidelines  Pooled Cohorts Equation Calculator  FRAX Risk Assessment  ICSI Preventive Guidelines  Dietary Guidelines for Americans, 2010  USDA's MyPlate  ASA Prophylaxis  Lung CA Screening    NANDO Ellison CNP  [facility]  "

## 2020-10-30 ENCOUNTER — OFFICE VISIT (OUTPATIENT)
Dept: FAMILY MEDICINE | Facility: CLINIC | Age: 55
End: 2020-10-30
Payer: COMMERCIAL

## 2020-10-30 VITALS
BODY MASS INDEX: 44.1 KG/M2 | DIASTOLIC BLOOD PRESSURE: 84 MMHG | RESPIRATION RATE: 16 BRPM | TEMPERATURE: 97.8 F | WEIGHT: 315 LBS | SYSTOLIC BLOOD PRESSURE: 136 MMHG | HEART RATE: 66 BPM | HEIGHT: 71 IN

## 2020-10-30 DIAGNOSIS — Z12.5 SCREENING FOR PROSTATE CANCER: ICD-10-CM

## 2020-10-30 DIAGNOSIS — F33.9 EPISODE OF RECURRENT MAJOR DEPRESSIVE DISORDER, UNSPECIFIED DEPRESSION EPISODE SEVERITY (H): ICD-10-CM

## 2020-10-30 DIAGNOSIS — E66.01 MORBID OBESITY (H): ICD-10-CM

## 2020-10-30 DIAGNOSIS — Z00.00 ROUTINE GENERAL MEDICAL EXAMINATION AT A HEALTH CARE FACILITY: Primary | ICD-10-CM

## 2020-10-30 DIAGNOSIS — Z13.29 SCREENING FOR THYROID DISORDER: ICD-10-CM

## 2020-10-30 DIAGNOSIS — Z11.59 NEED FOR HEPATITIS C SCREENING TEST: ICD-10-CM

## 2020-10-30 DIAGNOSIS — Z13.220 SCREENING FOR LIPOID DISORDERS: ICD-10-CM

## 2020-10-30 DIAGNOSIS — I10 ESSENTIAL HYPERTENSION: ICD-10-CM

## 2020-10-30 LAB
ALBUMIN SERPL-MCNC: 3.6 G/DL (ref 3.4–5)
ALP SERPL-CCNC: 52 U/L (ref 40–150)
ALT SERPL W P-5'-P-CCNC: 27 U/L (ref 0–70)
ANION GAP SERPL CALCULATED.3IONS-SCNC: 5 MMOL/L (ref 3–14)
AST SERPL W P-5'-P-CCNC: 14 U/L (ref 0–45)
BILIRUB SERPL-MCNC: 0.4 MG/DL (ref 0.2–1.3)
BUN SERPL-MCNC: 16 MG/DL (ref 7–30)
CALCIUM SERPL-MCNC: 8.8 MG/DL (ref 8.5–10.1)
CHLORIDE SERPL-SCNC: 105 MMOL/L (ref 94–109)
CHOLEST SERPL-MCNC: 146 MG/DL
CO2 SERPL-SCNC: 29 MMOL/L (ref 20–32)
CREAT SERPL-MCNC: 0.78 MG/DL (ref 0.66–1.25)
GFR SERPL CREATININE-BSD FRML MDRD: >90 ML/MIN/{1.73_M2}
GLUCOSE SERPL-MCNC: 117 MG/DL (ref 70–99)
HBA1C MFR BLD: 5.4 % (ref 0–5.6)
HDLC SERPL-MCNC: 53 MG/DL
HGB BLD-MCNC: 14.6 G/DL (ref 13.3–17.7)
LDLC SERPL CALC-MCNC: 64 MG/DL
NONHDLC SERPL-MCNC: 93 MG/DL
POTASSIUM SERPL-SCNC: 4 MMOL/L (ref 3.4–5.3)
PROT SERPL-MCNC: 6.9 G/DL (ref 6.8–8.8)
PSA SERPL-ACNC: 0.13 UG/L (ref 0–4)
SODIUM SERPL-SCNC: 139 MMOL/L (ref 133–144)
TRIGL SERPL-MCNC: 147 MG/DL
TSH SERPL DL<=0.005 MIU/L-ACNC: 1.37 MU/L (ref 0.4–4)

## 2020-10-30 PROCEDURE — 99214 OFFICE O/P EST MOD 30 MIN: CPT | Mod: 25 | Performed by: NURSE PRACTITIONER

## 2020-10-30 PROCEDURE — 85018 HEMOGLOBIN: CPT | Performed by: NURSE PRACTITIONER

## 2020-10-30 PROCEDURE — 80053 COMPREHEN METABOLIC PANEL: CPT | Performed by: NURSE PRACTITIONER

## 2020-10-30 PROCEDURE — 80061 LIPID PANEL: CPT | Performed by: NURSE PRACTITIONER

## 2020-10-30 PROCEDURE — 83036 HEMOGLOBIN GLYCOSYLATED A1C: CPT | Performed by: NURSE PRACTITIONER

## 2020-10-30 PROCEDURE — 84443 ASSAY THYROID STIM HORMONE: CPT | Performed by: NURSE PRACTITIONER

## 2020-10-30 PROCEDURE — 36415 COLL VENOUS BLD VENIPUNCTURE: CPT | Performed by: NURSE PRACTITIONER

## 2020-10-30 PROCEDURE — 86803 HEPATITIS C AB TEST: CPT | Performed by: NURSE PRACTITIONER

## 2020-10-30 PROCEDURE — 99396 PREV VISIT EST AGE 40-64: CPT | Performed by: NURSE PRACTITIONER

## 2020-10-30 PROCEDURE — G0103 PSA SCREENING: HCPCS | Performed by: NURSE PRACTITIONER

## 2020-10-30 RX ORDER — CITALOPRAM HYDROBROMIDE 40 MG/1
40 TABLET ORAL DAILY
Qty: 90 TABLET | Refills: 1 | Status: SHIPPED | OUTPATIENT
Start: 2020-10-30 | End: 2021-03-10

## 2020-10-30 RX ORDER — BUPROPION HYDROCHLORIDE 150 MG/1
150 TABLET ORAL DAILY
Qty: 90 TABLET | Refills: 1 | Status: SHIPPED | OUTPATIENT
Start: 2020-10-30 | End: 2021-03-10

## 2020-10-30 RX ORDER — HYDROCHLOROTHIAZIDE 25 MG/1
TABLET ORAL
Qty: 90 TABLET | Refills: 1 | Status: SHIPPED | OUTPATIENT
Start: 2020-10-30 | End: 2021-03-10

## 2020-10-30 RX ORDER — ATENOLOL 100 MG/1
100 TABLET ORAL DAILY
Qty: 90 TABLET | Refills: 1 | Status: SHIPPED | OUTPATIENT
Start: 2020-10-30 | End: 2021-03-10

## 2020-10-30 ASSESSMENT — ENCOUNTER SYMPTOMS
DYSURIA: 0
DIARRHEA: 0
FEVER: 0
MYALGIAS: 0
DIZZINESS: 0
JOINT SWELLING: 0
FREQUENCY: 0
HEADACHES: 0
HEMATURIA: 0
WEAKNESS: 0
ABDOMINAL PAIN: 0
CHILLS: 0
EYE PAIN: 0
COUGH: 0
PALPITATIONS: 0
HEARTBURN: 0
CONSTIPATION: 0
HEMATOCHEZIA: 0
SHORTNESS OF BREATH: 0
PARESTHESIAS: 0
NAUSEA: 0
NERVOUS/ANXIOUS: 0
ARTHRALGIAS: 0
SORE THROAT: 0

## 2020-10-30 ASSESSMENT — ANXIETY QUESTIONNAIRES
GAD7 TOTAL SCORE: 0
1. FEELING NERVOUS, ANXIOUS, OR ON EDGE: NOT AT ALL
7. FEELING AFRAID AS IF SOMETHING AWFUL MIGHT HAPPEN: NOT AT ALL
7. FEELING AFRAID AS IF SOMETHING AWFUL MIGHT HAPPEN: NOT AT ALL
3. WORRYING TOO MUCH ABOUT DIFFERENT THINGS: NOT AT ALL
5. BEING SO RESTLESS THAT IT IS HARD TO SIT STILL: NOT AT ALL
4. TROUBLE RELAXING: NOT AT ALL
GAD7 TOTAL SCORE: 0
GAD7 TOTAL SCORE: 0
2. NOT BEING ABLE TO STOP OR CONTROL WORRYING: NOT AT ALL
6. BECOMING EASILY ANNOYED OR IRRITABLE: NOT AT ALL

## 2020-10-30 ASSESSMENT — MIFFLIN-ST. JEOR: SCORE: 2513.69

## 2020-10-30 ASSESSMENT — PATIENT HEALTH QUESTIONNAIRE - PHQ9
SUM OF ALL RESPONSES TO PHQ QUESTIONS 1-9: 0
SUM OF ALL RESPONSES TO PHQ QUESTIONS 1-9: 0

## 2020-10-30 ASSESSMENT — PAIN SCALES - GENERAL: PAINLEVEL: NO PAIN (0)

## 2020-10-31 ASSESSMENT — ANXIETY QUESTIONNAIRES: GAD7 TOTAL SCORE: 0

## 2020-10-31 ASSESSMENT — PATIENT HEALTH QUESTIONNAIRE - PHQ9: SUM OF ALL RESPONSES TO PHQ QUESTIONS 1-9: 0

## 2020-11-01 LAB — HCV AB SERPL QL IA: NONREACTIVE

## 2021-01-03 ENCOUNTER — HEALTH MAINTENANCE LETTER (OUTPATIENT)
Age: 56
End: 2021-01-03

## 2021-03-09 ENCOUNTER — MYC MEDICAL ADVICE (OUTPATIENT)
Dept: FAMILY MEDICINE | Facility: CLINIC | Age: 56
End: 2021-03-09

## 2021-03-09 DIAGNOSIS — I10 ESSENTIAL HYPERTENSION: ICD-10-CM

## 2021-03-09 DIAGNOSIS — F33.9 EPISODE OF RECURRENT MAJOR DEPRESSIVE DISORDER, UNSPECIFIED DEPRESSION EPISODE SEVERITY (H): ICD-10-CM

## 2021-03-10 RX ORDER — HYDROCHLOROTHIAZIDE 25 MG/1
TABLET ORAL
Qty: 90 TABLET | Refills: 0 | Status: SHIPPED | OUTPATIENT
Start: 2021-03-10 | End: 2021-04-02

## 2021-03-10 RX ORDER — ATENOLOL 100 MG/1
100 TABLET ORAL DAILY
Qty: 90 TABLET | Refills: 0 | Status: SHIPPED | OUTPATIENT
Start: 2021-03-10 | End: 2021-04-02

## 2021-03-10 RX ORDER — BUPROPION HYDROCHLORIDE 150 MG/1
150 TABLET ORAL DAILY
Qty: 90 TABLET | Refills: 0 | Status: SHIPPED | OUTPATIENT
Start: 2021-03-10 | End: 2021-04-02

## 2021-03-10 RX ORDER — CITALOPRAM HYDROBROMIDE 40 MG/1
40 TABLET ORAL DAILY
Qty: 90 TABLET | Refills: 0 | Status: SHIPPED | OUTPATIENT
Start: 2021-03-10 | End: 2021-04-02

## 2021-03-10 NOTE — TELEPHONE ENCOUNTER
Signed Prescriptions:                        Disp   Refills    atenolol (TENORMIN) 100 MG tablet          90 tab*0        Sig: Take 1 tablet (100 mg) by mouth daily  Authorizing Provider: SHAMA SCHUSTER  Ordering User: TONG GREY    buPROPion (WELLBUTRIN XL) 150 MG 24 hr tab*90 tab*0        Sig: Take 1 tablet (150 mg) by mouth daily  Authorizing Provider: SHAMA SCHUSTER  Ordering User: TONG GREY    citalopram (CELEXA) 40 MG tablet           90 tab*0        Sig: Take 1 tablet (40 mg) by mouth daily  Authorizing Provider: SHAMA SCHUSTER  Ordering User: TONG GREY    hydrochlorothiazide (HYDRODIURIL) 25 MG ta*90 tab*0        Sig: TAKE 1 TAB BY MOUTH ONCE DAILY IN THE MORNING  Authorizing Provider: SHAMA SCHUSTER  Ordering User: TONG GREY    Medication is being filled for 1 time tamara refill only due to:  Patient is due for medication check. This nurse sent a mychart.     Please call and help schedule.  Thank you!      Tong Grey, RN, BSN  [place]  March 10, 2021

## 2021-03-30 NOTE — PROGRESS NOTES
Delbert is a 55 year old who is being evaluated via a billable video visit.      How would you like to obtain your AVS? MyChart  If the video visit is dropped, the invitation should be resent by:   Will anyone else be joining your video visit?     Video Start Time: 1320    Assessment & Plan     Essential hypertension  Stable, continue plan, weight loss advised.   - hydrochlorothiazide (HYDRODIURIL) 25 MG tablet; TAKE 1 TAB BY MOUTH ONCE DAILY IN THE MORNING  - atenolol (TENORMIN) 100 MG tablet; Take 1 tablet (100 mg) by mouth daily    Episode of recurrent major depressive disorder, unspecified depression episode severity (H)  Stable, meds refilled.   - citalopram (CELEXA) 40 MG tablet; Take 1 tablet (40 mg) by mouth daily  - buPROPion (WELLBUTRIN XL) 150 MG 24 hr tablet; Take 1 tablet (150 mg) by mouth daily    Morbid obesity (H)  Re-check 6 months.   Labs are UTD.   I think  is great idea.       Return in about 6 months (around 10/2/2021) for Physical Exam.    NANDO Ellison [facility] [place] is a 55 year old who presents for the following health issues     HPI     Hypertension Follow-up      Do you check your blood pressure regularly outside of the clinic? Yes     Are you following a low salt diet? No    Are your blood pressures ever more than 140 on the top number (systolic) OR more   than 90 on the bottom number (diastolic), for example 140/90? No    Depression Followup    How are you doing with your depression since your last visit? Improved Feels great, no worries    Are you having other symptoms that might be associated with depression? No    Have you had a significant life event?  No     Are you feeling anxious or having panic attacks?   No    Do you have any concerns with your use of alcohol or other drugs? No      Low exercise and nutrition could use help.  Reports weight is the same, no changes.   Is looking into an .   Social History      Tobacco Use     Smoking status: Former Smoker     Packs/day: 1.00     Years: 20.00     Pack years: 20.00     Smokeless tobacco: Never Used     Tobacco comment: quit 13 years    Substance Use Topics     Alcohol use: Not Currently     Comment: once every 3 months      Drug use: No     Comment: none      PHQ 4/7/2020 10/30/2020 4/2/2021   PHQ-9 Total Score 0 0 0   Q9: Thoughts of better off dead/self-harm past 2 weeks Not at all Not at all Not at all     MYKE-7 SCORE 4/7/2020 10/30/2020 4/2/2021   Total Score - 0 (minimal anxiety) 0 (minimal anxiety)   Total Score 0 0 0     Last PHQ-9 4/2/2021   1.  Little interest or pleasure in doing things 0   2.  Feeling down, depressed, or hopeless 0   3.  Trouble falling or staying asleep, or sleeping too much 0   4.  Feeling tired or having little energy 0   5.  Poor appetite or overeating 0   6.  Feeling bad about yourself 0   7.  Trouble concentrating 0   8.  Moving slowly or restless 0   Q9: Thoughts of better off dead/self-harm past 2 weeks 0   PHQ-9 Total Score 0   Difficulty at work, home, or with people -     MYKE-7  4/2/2021   1. Feeling nervous, anxious, or on edge 0   2. Not being able to stop or control worrying 0   3. Worrying too much about different things 0   4. Trouble relaxing 0   5. Being so restless that it is hard to sit still 0   6. Becoming easily annoyed or irritable 0   7. Feeling afraid, as if something awful might happen 0   MYKE-7 Total Score 0   If you checked any problems, how difficult have they made it for you to do your work, take care of things at home, or get along with other people? -       Suicide Assessment Five-step Evaluation and Treatment (SAFE-T)      How many servings of fruits and vegetables do you eat daily?  0-1    On average, how many sweetened beverages do you drink each day (Examples: soda, juice, sweet tea, etc.  Do NOT count diet or artificially sweetened beverages)?   0    How many days per week do you exercise enough to make  your heart beat faster? 3 or less    How many minutes a day do you exercise enough to make your heart beat faster? 9 or less    How many days per week do you miss taking your medication? 0    Answers for HPI/ROS submitted by the patient on 4/2/2021   MYKE 7 TOTAL SCORE: 0        Review of Systems   Constitutional, HEENT, cardiovascular, pulmonary, gi and gu systems are negative, except as otherwise noted.      Objective           Vitals:  No vitals were obtained today due to virtual visit.    Physical Exam   GENERAL: Healthy, alert and no distress  EYES: Eyes grossly normal to inspection.  No discharge or erythema, or obvious scleral/conjunctival abnormalities.  RESP: No audible wheeze, cough, or visible cyanosis.  No visible retractions or increased work of breathing.    SKIN: Visible skin clear. No significant rash, abnormal pigmentation or lesions.  NEURO: Cranial nerves grossly intact.  Mentation and speech appropriate for age.  PSYCH: Mentation appears normal, affect normal/bright, judgement and insight intact, normal speech and appearance well-groomed.    Office Visit on 10/30/2020   Component Date Value Ref Range Status     Hepatitis C Antibody 10/30/2020 Nonreactive  NR^Nonreactive Final    Comment: Assay performance characteristics have not been established for newborns,   infants, and children       Cholesterol 10/30/2020 146  <200 mg/dL Final     Triglycerides 10/30/2020 147  <150 mg/dL Final     HDL Cholesterol 10/30/2020 53  >39 mg/dL Final     LDL Cholesterol Calculated 10/30/2020 64  <100 mg/dL Final    Desirable:       <100 mg/dl     Non HDL Cholesterol 10/30/2020 93  <130 mg/dL Final     Sodium 10/30/2020 139  133 - 144 mmol/L Final     Potassium 10/30/2020 4.0  3.4 - 5.3 mmol/L Final     Chloride 10/30/2020 105  94 - 109 mmol/L Final     Carbon Dioxide 10/30/2020 29  20 - 32 mmol/L Final     Anion Gap 10/30/2020 5  3 - 14 mmol/L Final     Glucose 10/30/2020 117* 70 - 99 mg/dL Final     Urea Nitrogen  10/30/2020 16  7 - 30 mg/dL Final     Creatinine 10/30/2020 0.78  0.66 - 1.25 mg/dL Final     GFR Estimate 10/30/2020 >90  >60 mL/min/[1.73_m2] Final    Comment: Non  GFR Calc  Starting 12/18/2018, serum creatinine based estimated GFR (eGFR) will be   calculated using the Chronic Kidney Disease Epidemiology Collaboration   (CKD-EPI) equation.       GFR Estimate If Black 10/30/2020 >90  >60 mL/min/[1.73_m2] Final    Comment:  GFR Calc  Starting 12/18/2018, serum creatinine based estimated GFR (eGFR) will be   calculated using the Chronic Kidney Disease Epidemiology Collaboration   (CKD-EPI) equation.       Calcium 10/30/2020 8.8  8.5 - 10.1 mg/dL Final     Bilirubin Total 10/30/2020 0.4  0.2 - 1.3 mg/dL Final     Albumin 10/30/2020 3.6  3.4 - 5.0 g/dL Final     Protein Total 10/30/2020 6.9  6.8 - 8.8 g/dL Final     Alkaline Phosphatase 10/30/2020 52  40 - 150 U/L Final     ALT 10/30/2020 27  0 - 70 U/L Final     AST 10/30/2020 14  0 - 45 U/L Final     PSA 10/30/2020 0.13  0 - 4 ug/L Final    Assay Method:  Chemiluminescence using Siemens Vista analyzer     TSH 10/30/2020 1.37  0.40 - 4.00 mU/L Final     Hemoglobin 10/30/2020 14.6  13.3 - 17.7 g/dL Final     Hemoglobin A1C 10/30/2020 5.4  0 - 5.6 % Final    Comment: Normal <5.7% Prediabetes 5.7-6.4%  Diabetes 6.5% or higher - adopted from ADA   consensus guidelines.                   Video-Visit Details    Type of service:  Video Visit    Video End Time:1:27 PM    Originating Location (pt. Location): Home    [facility]     Platform used for Video Visit: Brenna

## 2021-04-02 ENCOUNTER — VIRTUAL VISIT (OUTPATIENT)
Dept: FAMILY MEDICINE | Facility: CLINIC | Age: 56
End: 2021-04-02
Payer: COMMERCIAL

## 2021-04-02 DIAGNOSIS — I10 ESSENTIAL HYPERTENSION: ICD-10-CM

## 2021-04-02 DIAGNOSIS — E66.01 MORBID OBESITY (H): ICD-10-CM

## 2021-04-02 DIAGNOSIS — F33.9 EPISODE OF RECURRENT MAJOR DEPRESSIVE DISORDER, UNSPECIFIED DEPRESSION EPISODE SEVERITY (H): ICD-10-CM

## 2021-04-02 PROCEDURE — 99214 OFFICE O/P EST MOD 30 MIN: CPT | Mod: GT | Performed by: NURSE PRACTITIONER

## 2021-04-02 RX ORDER — CITALOPRAM HYDROBROMIDE 40 MG/1
40 TABLET ORAL DAILY
Qty: 90 TABLET | Refills: 0 | Status: SHIPPED | OUTPATIENT
Start: 2021-04-02 | End: 2021-08-04

## 2021-04-02 RX ORDER — ATENOLOL 100 MG/1
100 TABLET ORAL DAILY
Qty: 90 TABLET | Refills: 0 | Status: SHIPPED | OUTPATIENT
Start: 2021-04-02 | End: 2021-08-05

## 2021-04-02 RX ORDER — HYDROCHLOROTHIAZIDE 25 MG/1
TABLET ORAL
Qty: 90 TABLET | Refills: 0 | Status: SHIPPED | OUTPATIENT
Start: 2021-04-02 | End: 2021-11-01

## 2021-04-02 RX ORDER — BUPROPION HYDROCHLORIDE 150 MG/1
150 TABLET ORAL DAILY
Qty: 90 TABLET | Refills: 0 | Status: SHIPPED | OUTPATIENT
Start: 2021-04-02 | End: 2021-11-01

## 2021-04-02 ASSESSMENT — ANXIETY QUESTIONNAIRES
6. BECOMING EASILY ANNOYED OR IRRITABLE: NOT AT ALL
GAD7 TOTAL SCORE: 0
1. FEELING NERVOUS, ANXIOUS, OR ON EDGE: NOT AT ALL
4. TROUBLE RELAXING: NOT AT ALL
7. FEELING AFRAID AS IF SOMETHING AWFUL MIGHT HAPPEN: NOT AT ALL
2. NOT BEING ABLE TO STOP OR CONTROL WORRYING: NOT AT ALL
7. FEELING AFRAID AS IF SOMETHING AWFUL MIGHT HAPPEN: NOT AT ALL
3. WORRYING TOO MUCH ABOUT DIFFERENT THINGS: NOT AT ALL
5. BEING SO RESTLESS THAT IT IS HARD TO SIT STILL: NOT AT ALL
GAD7 TOTAL SCORE: 0

## 2021-04-02 ASSESSMENT — PATIENT HEALTH QUESTIONNAIRE - PHQ9: SUM OF ALL RESPONSES TO PHQ QUESTIONS 1-9: 0

## 2021-04-03 ASSESSMENT — ANXIETY QUESTIONNAIRES: GAD7 TOTAL SCORE: 0

## 2021-08-04 DIAGNOSIS — F33.9 EPISODE OF RECURRENT MAJOR DEPRESSIVE DISORDER, UNSPECIFIED DEPRESSION EPISODE SEVERITY (H): ICD-10-CM

## 2021-08-04 RX ORDER — CITALOPRAM HYDROBROMIDE 40 MG/1
TABLET ORAL
Qty: 90 TABLET | Refills: 0 | Status: SHIPPED | OUTPATIENT
Start: 2021-08-04 | End: 2021-11-01

## 2021-08-04 NOTE — TELEPHONE ENCOUNTER
Pending Prescriptions:                       Disp   Refills    citalopram (CELEXA) 40 MG tablet [Pharmac*90 tab*0            Sig: TAKE 1 TABLET BY MOUTH EVERY DAY    Medication is being filled for 1 time tamara refill only due to:  Patient is due for physical exam    Please call and help schedule.  Thank you!

## 2021-08-05 DIAGNOSIS — I10 ESSENTIAL HYPERTENSION: ICD-10-CM

## 2021-08-05 RX ORDER — ATENOLOL 100 MG/1
TABLET ORAL
Qty: 90 TABLET | Refills: 0 | Status: SHIPPED | OUTPATIENT
Start: 2021-08-05 | End: 2021-11-01

## 2021-08-05 NOTE — LETTER
[facility]ONNY [facility] [place]  [address], [address]  [place] [address]  Phone: [phone]  Fax: [phone]        August 11, 2021      Delbert RONALDO Dowling                                                                                                                                [id number] [place] [address]            Dear Mr. Dowling,        Your provider has sent a tamara refill for your atenolol (TENORMIN) 100 MG tablet . You will be due for a physical and mediation check at the beginning of November before another refill will be sent.    Please call [phone] to schedule or you can schedule via [facility].     Have a good day,     LONNY [facility] [place] / Lissa Gamez

## 2021-08-05 NOTE — TELEPHONE ENCOUNTER
Pending Prescriptions:                       Disp   Refills    atenolol (TENORMIN) 100 MG tablet [Pharma*90 tab*0            Sig: TAKE 1 TABLET BY MOUTH EVERY DAY    Medication is being filled for 1 time tamara refill only due to:  Patient is due for medication follow up    Please call and help schedule.  Thank you!    Jen Weems RN on 8/5/2021 at 9:39 AM

## 2021-08-11 NOTE — TELEPHONE ENCOUNTER
Letter sent.    MyChart and phone call attempted.     Closing encounter.     Paulino Jackson MA on 8/11/2021 at 4:28 PM

## 2021-10-10 ENCOUNTER — HEALTH MAINTENANCE LETTER (OUTPATIENT)
Age: 56
End: 2021-10-10

## 2021-10-29 ASSESSMENT — ENCOUNTER SYMPTOMS
ABDOMINAL PAIN: 0
MYALGIAS: 0
DIZZINESS: 0
FREQUENCY: 0
WEAKNESS: 0
DIARRHEA: 0
CHILLS: 0
ARTHRALGIAS: 0
SHORTNESS OF BREATH: 0
HEMATOCHEZIA: 0
PARESTHESIAS: 0
FEVER: 0
NAUSEA: 0
HEARTBURN: 0
PALPITATIONS: 0
HEMATURIA: 0
NERVOUS/ANXIOUS: 0
COUGH: 0
HEADACHES: 0
JOINT SWELLING: 0
EYE PAIN: 0
SORE THROAT: 0
CONSTIPATION: 0
DYSURIA: 0

## 2021-10-29 NOTE — PROGRESS NOTES
SUBJECTIVE:   CC: Delbert Dowling is an 55 year old male who presents for preventative health visit.       Patient has been advised of split billing requirements and indicates understanding: Yes  Healthy Habits:     Getting at least 3 servings of Calcium per day:  Yes    Bi-annual eye exam:  Yes    Dental care twice a year:  Yes    Sleep apnea or symptoms of sleep apnea:  Sleep apnea    Diet:  Regular (no restrictions)    Frequency of exercise:  None    Taking medications regularly:  Yes    Medication side effects:  Not applicable    PHQ-2 Total Score: 0    Additional concerns today:  No      Works with a  3 times/week.           Hypertension Follow-up      Do you check your blood pressure regularly outside of the clinic? No     Are you following a low salt diet? No    Are your blood pressures ever more than 140 on the top number (systolic) OR more   than 90 on the bottom number (diastolic), for example 140/90? No    Depression Followup    How are you doing with your depression since your last visit? No change    Are you having other symptoms that might be associated with depression? No    Have you had a significant life event?  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   No    Do you have any concerns with your use of alcohol or other drugs? No    Social History     Tobacco Use     Smoking status: Former Smoker     Packs/day: 1.00     Years: 20.00     Pack years: 20.00     Smokeless tobacco: Never Used     Tobacco comment: quit 13 years    Vaping Use     Vaping Use: Never used   Substance Use Topics     Alcohol use: Not Currently     Comment: once every 3 months      Drug use: No     Comment: none      PHQ 10/30/2020 4/2/2021 11/1/2021   PHQ-9 Total Score 0 0 0   Q9: Thoughts of better off dead/self-harm past 2 weeks Not at all Not at all Not at all     MYKE-7 SCORE 4/7/2020 10/30/2020 4/2/2021   Total Score - 0 (minimal anxiety)  0 (minimal anxiety)   Total Score 0 0 0     Last PHQ-9 11/1/2021   1.  Little interest or pleasure in doing things 0   2.  Feeling down, depressed, or hopeless 0   3.  Trouble falling or staying asleep, or sleeping too much 0   4.  Feeling tired or having little energy 0   5.  Poor appetite or overeating 0   6.  Feeling bad about yourself 0   7.  Trouble concentrating 0   8.  Moving slowly or restless 0   Q9: Thoughts of better off dead/self-harm past 2 weeks 0   PHQ-9 Total Score 0   Difficulty at work, home, or with people Not difficult at all     MYKE-7  4/2/2021   1. Feeling nervous, anxious, or on edge 0   2. Not being able to stop or control worrying 0   3. Worrying too much about different things 0   4. Trouble relaxing 0   5. Being so restless that it is hard to sit still 0   6. Becoming easily annoyed or irritable 0   7. Feeling afraid, as if something awful might happen 0   YMKE-7 Total Score 0   If you checked any problems, how difficult have they made it for you to do your work, take care of things at home, or get along with other people? -       Suicide Assessment Five-step Evaluation and Treatment (SAFE-T)      Today's PHQ-2 Score:   PHQ-2 ( 1999 [employer]) 10/29/2021   Q1: Little interest or pleasure in doing things 0   Q2: Feeling down, depressed or hopeless 0   PHQ-2 Score 0   Q1: Little interest or pleasure in doing things Not at all   Q2: Feeling down, depressed or hopeless Not at all   PHQ-2 Score 0       Abuse: Current or Past(Physical, Sexual or Emotional)- No  Do you feel safe in your environment? yes    Have you ever done Advance Care Planning? (For example, a Health Directive, POLST, or a discussion with a medical provider or your loved ones about your wishes): No, advance care planning information given to patient to review.  Patient declined advance care planning discussion at this time.    Social History     Tobacco Use     Smoking status: Former Smoker     Packs/day: 1.00     Years: 20.00      "Pack years: 20.00     Smokeless tobacco: Never Used     Tobacco comment: quit 13 years    Substance Use Topics     Alcohol use: Not Currently     Comment: once every 3 months      If you drink alcohol do you typically have >3 drinks per day or >7 drinks per week? No    Alcohol Use 11/1/2021   Prescreen: >3 drinks/day or >7 drinks/week? -   Prescreen: >3 drinks/day or >7 drinks/week? No       Last PSA:   PSA   Date Value Ref Range Status   10/30/2020 0.13 0 - 4 ug/L Final     Comment:     Assay Method:  Chemiluminescence using Siemens Vista analyzer       Reviewed orders with patient. Reviewed health maintenance and updated orders accordingly - Yes  Lab work is in process    Reviewed and updated as needed this visit by clinical staff  Tobacco  Allergies    Med Hx  Surg Hx  Fam Hx  Soc Hx        Reviewed and updated as needed this visit by Provider                    Review of Systems   Constitutional: Negative for chills and fever.   HENT: Negative for congestion, ear pain, hearing loss and sore throat.    Eyes: Negative for pain and visual disturbance.   Respiratory: Negative for cough and shortness of breath.    Cardiovascular: Negative for chest pain, palpitations and peripheral edema.   Gastrointestinal: Negative for abdominal pain, constipation, diarrhea, heartburn, hematochezia and nausea.   Genitourinary: Negative for discharge, dysuria, frequency, genital sores, hematuria, impotence and urgency.   Musculoskeletal: Negative for arthralgias, joint swelling and myalgias.   Skin: Negative for rash.   Neurological: Negative for dizziness, weakness, headaches and paresthesias.   Psychiatric/Behavioral: Negative for mood changes. The patient is not nervous/anxious.          OBJECTIVE:   /86   Pulse 75   Temp (!) 96.3  F (35.7  C) (Temporal)   Resp 16   Ht 1.809 m (5' 11.22\")   Wt (!) 170.7 kg (376 lb 6.4 oz)   SpO2 92%   BMI 52.17 kg/m      Physical Exam  GENERAL: healthy, alert and no " distress  EYES: Eyes grossly normal to inspection, PERRL and conjunctivae and sclerae normal  HENT: ear canals and TM's normal, nose and mouth without ulcers or lesions  NECK: no adenopathy, no asymmetry, masses, or scars and thyroid normal to palpation  RESP: lungs clear to auscultation - no rales, rhonchi or wheezes  CV: regular rate and rhythm, normal S1 S2, no S3 or S4, no murmur, click or rub, no peripheral edema and peripheral pulses strong  ABDOMEN: obese, soft, nontender, no hepatosplenomegaly, no masses and bowel sounds normal  MS: no gross musculoskeletal defects noted, no edema  SKIN: no suspicious lesions or rashes  NEURO: Normal strength and tone, mentation intact and speech normal  PSYCH: mentation appears normal, affect normal/bright    Diagnostic Test Results:  Labs reviewed in Epic  Results for orders placed or performed in visit on 11/01/21 (from the past 24 hour(s))   Hemoglobin A1c   Result Value Ref Range    Hemoglobin A1C 5.7 (H) 0.0 - 5.6 %   CBC with Platelets & Differential    Narrative    The following orders were created for panel order CBC with Platelets & Differential.  Procedure                               Abnormality         Status                     ---------                               -----------         ------                     CBC with platelets and d...[[id number]]                      Final result                 Please view results for these tests on the individual orders.   CBC with platelets and differential   Result Value Ref Range    WBC Count 7.0 4.0 - 11.0 10e3/uL    RBC Count 4.46 4.40 - 5.90 10e6/uL    Hemoglobin 14.1 13.3 - 17.7 g/dL    Hematocrit 40.9 40.0 - 53.0 %    MCV 92 78 - 100 fL    MCH 31.6 26.5 - 33.0 pg    MCHC 34.5 31.5 - 36.5 g/dL    RDW 12.7 10.0 - 15.0 %    Platelet Count 167 150 - 450 10e3/uL    % Neutrophils 68 %    % Lymphocytes 21 %    % Monocytes 9 %    % Eosinophils 1 %    % Basophils 1 %    Absolute Neutrophils 4.8 1.6 - 8.3 10e3/uL     "Absolute Lymphocytes 1.5 0.8 - 5.3 10e3/uL    Absolute Monocytes 0.6 0.0 - 1.3 10e3/uL    Absolute Eosinophils 0.1 0.0 - 0.7 10e3/uL    Absolute Basophils 0.0 0.0 - 0.2 10e3/uL       ASSESSMENT/PLAN:       ICD-10-CM    1. Routine general medical examination at a health care facility  Z00.00 CBC with Platelets & Differential     Hemoglobin A1c     TSH with free T4 reflex     Lipid panel reflex to direct LDL Fasting     Lipid panel reflex to direct LDL Fasting     TSH with free T4 reflex     Hemoglobin A1c     CBC with Platelets & Differential   2. Essential hypertension  I10 atenolol (TENORMIN) 100 MG tablet     hydrochlorothiazide (HYDRODIURIL) 25 MG tablet     Comprehensive metabolic panel     OFFICE/OUTPT VISIT,EST,LEVL IV     Comprehensive metabolic panel   3. Episode of recurrent major depressive disorder, unspecified depression episode severity (H)  F33.9 buPROPion (WELLBUTRIN XL) 150 MG 24 hr tablet     citalopram (CELEXA) 40 MG tablet     OFFICE/OUTPT VISIT,EST,LEVL IV   4. Morbid obesity (H)  E66.01 OFFICE/OUTPT VISIT,EST,LEVL IV   5. History of pulmonary embolism  Z86.711    6. Prediabetes  R73.03    7. CLAIR (obstructive sleep apnea)  G47.33        Patient has been advised of split billing requirements and indicates understanding: Yes  COUNSELING:   Reviewed preventive health counseling, as reflected in patient instructions       Regular exercise       Healthy diet/nutrition       Immunizations    Vaccinated for: Zoster             Colon cancer screening       Prostate cancer screening       Advance Care Planning    Estimated body mass index is 52.17 kg/m  as calculated from the following:    Height as of this encounter: 1.809 m (5' 11.22\").    Weight as of this encounter: 170.7 kg (376 lb 6.4 oz).     Weight management plan: Discussed healthy diet and exercise guidelines declined referral     CLAIR- continue Cpap use.     HTN- borderline high.  Continue exercise.  Weight loss advised.  Noted to be prediabetic " today.  Will advise of this.  Medications refilled.  Recheck in 6 to 12 months.    History of pulmonary embolism.  No leg pain.  No shortness of breath or chest pain with exercise.    Depression-able patient has high deductible will update PHQ-9 MYKE-7 in 6 months.    He reports that he has quit smoking. He has a 20.00 pack-year smoking history. He has never used smokeless tobacco.      Counseling Resources:  ATP IV Guidelines  Pooled Cohorts Equation Calculator  FRAX Risk Assessment  ICSI Preventive Guidelines  Dietary Guidelines for Americans, 2010  USDA's MyPlate  ASA Prophylaxis  Lung CA Screening    NANDO Ellison CNP  [facility]

## 2021-10-30 DIAGNOSIS — F33.9 EPISODE OF RECURRENT MAJOR DEPRESSIVE DISORDER, UNSPECIFIED DEPRESSION EPISODE SEVERITY (H): ICD-10-CM

## 2021-10-30 DIAGNOSIS — I10 ESSENTIAL HYPERTENSION: ICD-10-CM

## 2021-11-01 ENCOUNTER — OFFICE VISIT (OUTPATIENT)
Dept: FAMILY MEDICINE | Facility: CLINIC | Age: 56
End: 2021-11-01
Payer: COMMERCIAL

## 2021-11-01 VITALS
TEMPERATURE: 96.3 F | RESPIRATION RATE: 16 BRPM | DIASTOLIC BLOOD PRESSURE: 86 MMHG | BODY MASS INDEX: 44.1 KG/M2 | WEIGHT: 315 LBS | OXYGEN SATURATION: 92 % | SYSTOLIC BLOOD PRESSURE: 138 MMHG | HEIGHT: 71 IN | HEART RATE: 75 BPM

## 2021-11-01 DIAGNOSIS — F33.9 EPISODE OF RECURRENT MAJOR DEPRESSIVE DISORDER, UNSPECIFIED DEPRESSION EPISODE SEVERITY (H): ICD-10-CM

## 2021-11-01 DIAGNOSIS — E66.01 MORBID OBESITY (H): ICD-10-CM

## 2021-11-01 DIAGNOSIS — Z86.711 HISTORY OF PULMONARY EMBOLISM: ICD-10-CM

## 2021-11-01 DIAGNOSIS — I10 ESSENTIAL HYPERTENSION: ICD-10-CM

## 2021-11-01 DIAGNOSIS — R73.03 PREDIABETES: ICD-10-CM

## 2021-11-01 DIAGNOSIS — Z00.00 ROUTINE GENERAL MEDICAL EXAMINATION AT A HEALTH CARE FACILITY: Primary | ICD-10-CM

## 2021-11-01 DIAGNOSIS — G47.33 OSA (OBSTRUCTIVE SLEEP APNEA): ICD-10-CM

## 2021-11-01 LAB
ALBUMIN SERPL-MCNC: 3.7 G/DL (ref 3.4–5)
ALP SERPL-CCNC: 49 U/L (ref 40–150)
ALT SERPL W P-5'-P-CCNC: 33 U/L (ref 0–70)
ANION GAP SERPL CALCULATED.3IONS-SCNC: 5 MMOL/L (ref 3–14)
AST SERPL W P-5'-P-CCNC: 19 U/L (ref 0–45)
BASOPHILS # BLD AUTO: 0 10E3/UL (ref 0–0.2)
BASOPHILS NFR BLD AUTO: 1 %
BILIRUB SERPL-MCNC: 0.7 MG/DL (ref 0.2–1.3)
BUN SERPL-MCNC: 19 MG/DL (ref 7–30)
CALCIUM SERPL-MCNC: 9.1 MG/DL (ref 8.5–10.1)
CHLORIDE BLD-SCNC: 104 MMOL/L (ref 94–109)
CHOLEST SERPL-MCNC: 128 MG/DL
CO2 SERPL-SCNC: 29 MMOL/L (ref 20–32)
CREAT SERPL-MCNC: 0.91 MG/DL (ref 0.66–1.25)
EOSINOPHIL # BLD AUTO: 0.1 10E3/UL (ref 0–0.7)
EOSINOPHIL NFR BLD AUTO: 1 %
ERYTHROCYTE [DISTWIDTH] IN BLOOD BY AUTOMATED COUNT: 12.7 % (ref 10–15)
FASTING STATUS PATIENT QL REPORTED: YES
GFR SERPL CREATININE-BSD FRML MDRD: >90 ML/MIN/1.73M2
GLUCOSE BLD-MCNC: 144 MG/DL (ref 70–99)
HBA1C MFR BLD: 5.7 % (ref 0–5.6)
HCT VFR BLD AUTO: 40.9 % (ref 40–53)
HDLC SERPL-MCNC: 46 MG/DL
HGB BLD-MCNC: 14.1 G/DL (ref 13.3–17.7)
LDLC SERPL CALC-MCNC: 47 MG/DL
LYMPHOCYTES # BLD AUTO: 1.5 10E3/UL (ref 0.8–5.3)
LYMPHOCYTES NFR BLD AUTO: 21 %
MCH RBC QN AUTO: 31.6 PG (ref 26.5–33)
MCHC RBC AUTO-ENTMCNC: 34.5 G/DL (ref 31.5–36.5)
MCV RBC AUTO: 92 FL (ref 78–100)
MONOCYTES # BLD AUTO: 0.6 10E3/UL (ref 0–1.3)
MONOCYTES NFR BLD AUTO: 9 %
NEUTROPHILS # BLD AUTO: 4.8 10E3/UL (ref 1.6–8.3)
NEUTROPHILS NFR BLD AUTO: 68 %
NONHDLC SERPL-MCNC: 82 MG/DL
PLATELET # BLD AUTO: 167 10E3/UL (ref 150–450)
POTASSIUM BLD-SCNC: 4 MMOL/L (ref 3.4–5.3)
PROT SERPL-MCNC: 6.9 G/DL (ref 6.8–8.8)
RBC # BLD AUTO: 4.46 10E6/UL (ref 4.4–5.9)
SODIUM SERPL-SCNC: 138 MMOL/L (ref 133–144)
TRIGL SERPL-MCNC: 176 MG/DL
TSH SERPL DL<=0.005 MIU/L-ACNC: 1.83 MU/L (ref 0.4–4)
WBC # BLD AUTO: 7 10E3/UL (ref 4–11)

## 2021-11-01 PROCEDURE — 36415 COLL VENOUS BLD VENIPUNCTURE: CPT | Performed by: NURSE PRACTITIONER

## 2021-11-01 PROCEDURE — 80061 LIPID PANEL: CPT | Performed by: NURSE PRACTITIONER

## 2021-11-01 PROCEDURE — 90471 IMMUNIZATION ADMIN: CPT | Performed by: NURSE PRACTITIONER

## 2021-11-01 PROCEDURE — 90750 HZV VACC RECOMBINANT IM: CPT | Performed by: NURSE PRACTITIONER

## 2021-11-01 PROCEDURE — 99214 OFFICE O/P EST MOD 30 MIN: CPT | Mod: 25 | Performed by: NURSE PRACTITIONER

## 2021-11-01 PROCEDURE — 80050 GENERAL HEALTH PANEL: CPT | Performed by: NURSE PRACTITIONER

## 2021-11-01 PROCEDURE — 83036 HEMOGLOBIN GLYCOSYLATED A1C: CPT | Performed by: NURSE PRACTITIONER

## 2021-11-01 PROCEDURE — 99396 PREV VISIT EST AGE 40-64: CPT | Mod: 25 | Performed by: NURSE PRACTITIONER

## 2021-11-01 RX ORDER — HYDROCHLOROTHIAZIDE 25 MG/1
TABLET ORAL
Qty: 90 TABLET | Refills: 3 | Status: SHIPPED | OUTPATIENT
Start: 2021-11-01 | End: 2022-11-14

## 2021-11-01 RX ORDER — CITALOPRAM HYDROBROMIDE 40 MG/1
40 TABLET ORAL DAILY
Qty: 90 TABLET | Refills: 1 | Status: SHIPPED | OUTPATIENT
Start: 2021-11-01 | End: 2022-05-12 | Stop reason: ALTCHOICE

## 2021-11-01 RX ORDER — ATENOLOL 100 MG/1
TABLET ORAL
Qty: 90 TABLET | Refills: 0 | OUTPATIENT
Start: 2021-11-01

## 2021-11-01 RX ORDER — ATENOLOL 100 MG/1
100 TABLET ORAL DAILY
Qty: 90 TABLET | Refills: 3 | Status: SHIPPED | OUTPATIENT
Start: 2021-11-01 | End: 2022-09-07

## 2021-11-01 RX ORDER — CITALOPRAM HYDROBROMIDE 40 MG/1
TABLET ORAL
Qty: 90 TABLET | Refills: 0 | OUTPATIENT
Start: 2021-11-01

## 2021-11-01 RX ORDER — BUPROPION HYDROCHLORIDE 150 MG/1
150 TABLET ORAL DAILY
Qty: 90 TABLET | Refills: 1 | Status: SHIPPED | OUTPATIENT
Start: 2021-11-01 | End: 2022-05-12 | Stop reason: DRUGHIGH

## 2021-11-01 ASSESSMENT — ENCOUNTER SYMPTOMS
PARESTHESIAS: 0
WEAKNESS: 0
NAUSEA: 0
COUGH: 0
DIZZINESS: 0
SHORTNESS OF BREATH: 0
NERVOUS/ANXIOUS: 0
SORE THROAT: 0
HEMATURIA: 0
ABDOMINAL PAIN: 0
JOINT SWELLING: 0
CHILLS: 0
HEMATOCHEZIA: 0
PALPITATIONS: 0
MYALGIAS: 0
FREQUENCY: 0
DYSURIA: 0
HEARTBURN: 0
FEVER: 0
DIARRHEA: 0
CONSTIPATION: 0
HEADACHES: 0
EYE PAIN: 0
ARTHRALGIAS: 0

## 2021-11-01 ASSESSMENT — PATIENT HEALTH QUESTIONNAIRE - PHQ9: SUM OF ALL RESPONSES TO PHQ QUESTIONS 1-9: 0

## 2021-11-01 ASSESSMENT — PAIN SCALES - GENERAL: PAINLEVEL: NO PAIN (0)

## 2021-11-01 ASSESSMENT — MIFFLIN-ST. JEOR: SCORE: 2572.96

## 2021-11-01 NOTE — NURSING NOTE
Prior to immunization administration, verified patients identity using patient s name and date of birth. Please see Immunization Activity for additional information.     Screening Questionnaire for Adult Immunization    Are you sick today?   No   Do you have allergies to medications, food, a vaccine component or latex?   Yes   Have you ever had a serious reaction after receiving a vaccination?   No   Do you have a long-term health problem with heart, lung, kidney, or metabolic disease (e.g., diabetes), asthma, a blood disorder, no spleen, complement component deficiency, a cochlear implant, or a spinal fluid leak?  Are you on long-term aspirin therapy?   No   Do you have cancer, leukemia, HIV/AIDS, or any other immune system problem?   No   Do you have a parent, brother, or sister with an immune system problem?   No   In the past 3 months, have you taken medications that affect  your immune system, such as prednisone, other steroids, or anticancer drugs; drugs for the treatment of rheumatoid arthritis, Crohn s disease, or psoriasis; or have you had radiation treatments?   No   Have you had a seizure, or a brain or other nervous system problem?   No   During the past year, have you received a transfusion of blood or blood    products, or been given immune (gamma) globulin or antiviral drug?   No   For women: Are you pregnant or is there a chance you could become       pregnant during the next month?   No   Have you received any vaccinations in the past 4 weeks?   No     Immunization questionnaire was positive for at least one answer.  Notified provider.        Per orders of Lissa Gamez CNP, injection of shingles given by Akosua Page CMA. Patient instructed to remain in clinic for 15 minutes afterwards, and to report any adverse reaction to me immediately.       Screening performed by Akosua Page CMA on 11/1/2021 at 7:29 AM.

## 2021-11-03 RX ORDER — HYDROCHLOROTHIAZIDE 25 MG/1
TABLET ORAL
Qty: 90 TABLET | Refills: 0 | OUTPATIENT
Start: 2021-11-03

## 2021-11-03 RX ORDER — BUPROPION HYDROCHLORIDE 150 MG/1
150 TABLET ORAL DAILY
Qty: 90 TABLET | Refills: 0 | OUTPATIENT
Start: 2021-11-03

## 2022-01-07 ENCOUNTER — IMMUNIZATION (OUTPATIENT)
Dept: FAMILY MEDICINE | Facility: CLINIC | Age: 57
End: 2022-01-07
Payer: COMMERCIAL

## 2022-01-07 DIAGNOSIS — Z23 NEED FOR SHINGLES VACCINE: ICD-10-CM

## 2022-01-07 DIAGNOSIS — Z23 HIGH PRIORITY FOR 2019-NCOV VACCINE: Primary | ICD-10-CM

## 2022-01-07 PROCEDURE — 90750 HZV VACC RECOMBINANT IM: CPT

## 2022-01-07 PROCEDURE — 0064A COVID-19,PF,MODERNA (18+ YRS BOOSTER .25ML): CPT

## 2022-01-07 PROCEDURE — 90471 IMMUNIZATION ADMIN: CPT

## 2022-01-07 PROCEDURE — 91306 COVID-19,PF,MODERNA (18+ YRS BOOSTER .25ML): CPT

## 2022-01-07 NOTE — NURSING NOTE
Prior to immunization administration, verified patients identity using patient s name and date of birth. Please see Immunization Activity for additional information.     Screening Questionnaire for Adult Immunization    Are you sick today?   No   Do you have allergies to medications, food, a vaccine component or latex?   Yes   Have you ever had a serious reaction after receiving a vaccination?   No   Do you have a long-term health problem with heart, lung, kidney, or metabolic disease (e.g., diabetes), asthma, a blood disorder, no spleen, complement component deficiency, a cochlear implant, or a spinal fluid leak?  Are you on long-term aspirin therapy?   No   Do you have cancer, leukemia, HIV/AIDS, or any other immune system problem?   No   Do you have a parent, brother, or sister with an immune system problem?   No   In the past 3 months, have you taken medications that affect  your immune system, such as prednisone, other steroids, or anticancer drugs; drugs for the treatment of rheumatoid arthritis, Crohn s disease, or psoriasis; or have you had radiation treatments?   No   Have you had a seizure, or a brain or other nervous system problem?   No   During the past year, have you received a transfusion of blood or blood    products, or been given immune (gamma) globulin or antiviral drug?   No   For women: Are you pregnant or is there a chance you could become       pregnant during the next month?   No   Have you received any vaccinations in the past 4 weeks?   No     Immunization questionnaire was positive for at least one answer.  Notified known drug allergy.      Patient instructed to remain in clinic for 15 minutes afterwards, and to report any adverse reaction to me immediately.       Screening performed by Yulissa Meredith on 1/7/2022 at 9:57 AM.

## 2022-05-09 NOTE — PROGRESS NOTES
"  Assessment & Plan     Episode of recurrent major depressive disorder, unspecified depression episode severity (H)  switching off Celexa to Prozac, increasing Wellbutrin to 300 mg, Re-check in 4 weeks.   Counseling prn if wanting.   Re-check 4 weeks. No SI.   Will increase Prozac to 60 if not improved. .reutrn     - FLUoxetine (PROZAC) 40 MG capsule; Take 1 capsule (40 mg) by mouth daily  - buPROPion (WELLBUTRIN XL) 300 MG 24 hr tablet; Take 1 tablet (300 mg) by mouth every morning    Morbid obesity (H)  Healthy habits.     History of pulmonary embolism  No CP, SOB.               BMI:   Estimated body mass index is 53.55 kg/m  as calculated from the following:    Height as of 11/1/21: 1.809 m (5' 11.22\").    Weight as of this encounter: 175.2 kg (386 lb 4.8 oz).   Weight management plan: Discussed healthy diet and exercise guidelines        Return in about 4 weeks (around 6/9/2022) for Virtual Visit.    NANDO Ellison [facility] DAMARIS Mandujano is a 56 year old who presents for the following health issues     History of Present Illness       Mental Health Follow-up:  Patient presents to follow-up on Depression.Patient's depression since last visit has been:  No change  The patient is not having other symptoms associated with depression.      Any significant life events: No  Patient is not feeling anxious or having panic attacks.  Patient has no concerns about alcohol or drug use.       Today's PHQ-9         PHQ-9 Total Score: 9  PHQ-9 Q9 Thoughts of better off dead/self-harm past 2 weeks :   (P) Not at all    How difficult have these problems made it for you to do your work, take care of things at home, or get along with other people: Somewhat difficult    Today's MYKE-7 Score: 1    Reason for visit:  New anti-depression meds  Symptom onset:  3-4 weeks ago  Symptoms include:  Depression  Symptom intensity:  Moderate  Symptom progression:  Staying the same  Had these symptoms " before:  Yes  Has tried/received treatment for these symptoms:  Yes  Previous treatment was successful:  Yes      Last PHQ-9 5/10/2022   1.  Little interest or pleasure in doing things 2   2.  Feeling down, depressed, or hopeless 1   3.  Trouble falling or staying asleep, or sleeping too much 2   4.  Feeling tired or having little energy 1   5.  Poor appetite or overeating 2   6.  Feeling bad about yourself 1   7.  Trouble concentrating 0   8.  Moving slowly or restless 0   Q9: Thoughts of better off dead/self-harm past 2 weeks 0   PHQ-9 Total Score 9   Difficulty at work, home, or with people -     MYKE-7  5/10/2022   1. Feeling nervous, anxious, or on edge 0   2. Not being able to stop or control worrying 0   3. Worrying too much about different things 0   4. Trouble relaxing 0   5. Being so restless that it is hard to sit still 0   6. Becoming easily annoyed or irritable 1   7. Feeling afraid, as if something awful might happen 0   MYKE-7 Total Score 1   If you checked any problems, how difficult have they made it for you to do your work, take care of things at home, or get along with other people? -     No triggers. Occ. Irritability. Very self-aware. Wife thinks med adjustment needed.   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   Stress eats- likes this, aware he needs to make changes.   Sleep- fair, using Cpap.           Review of Systems   Constitutional, HEENT, cardiovascular, pulmonary, gi and gu systems are negative, except as otherwise noted.      Objective    /80   Pulse 65   Temp 99.1  F (37.3  C) (Oral)   Resp 16   Wt (!) 175.2 kg (386 lb 4.8 oz)   SpO2 96%   BMI 53.55 kg/m    Body mass index is 53.55 kg/m .  Physical Exam   GENERAL: healthy, alert and no distress  NEURO: Normal strength and tone, mentation intact and speech normal  PSYCH: mentation appears normal, affect normal/bright

## 2022-05-10 ASSESSMENT — ANXIETY QUESTIONNAIRES
GAD7 TOTAL SCORE: 1
GAD7 TOTAL SCORE: 1
2. NOT BEING ABLE TO STOP OR CONTROL WORRYING: NOT AT ALL
4. TROUBLE RELAXING: NOT AT ALL
6. BECOMING EASILY ANNOYED OR IRRITABLE: SEVERAL DAYS
7. FEELING AFRAID AS IF SOMETHING AWFUL MIGHT HAPPEN: NOT AT ALL
5. BEING SO RESTLESS THAT IT IS HARD TO SIT STILL: NOT AT ALL
GAD7 TOTAL SCORE: 1
8. IF YOU CHECKED OFF ANY PROBLEMS, HOW DIFFICULT HAVE THESE MADE IT FOR YOU TO DO YOUR WORK, TAKE CARE OF THINGS AT HOME, OR GET ALONG WITH OTHER PEOPLE?: SOMEWHAT DIFFICULT
3. WORRYING TOO MUCH ABOUT DIFFERENT THINGS: NOT AT ALL
1. FEELING NERVOUS, ANXIOUS, OR ON EDGE: NOT AT ALL
7. FEELING AFRAID AS IF SOMETHING AWFUL MIGHT HAPPEN: NOT AT ALL

## 2022-05-10 ASSESSMENT — PATIENT HEALTH QUESTIONNAIRE - PHQ9
10. IF YOU CHECKED OFF ANY PROBLEMS, HOW DIFFICULT HAVE THESE PROBLEMS MADE IT FOR YOU TO DO YOUR WORK, TAKE CARE OF THINGS AT HOME, OR GET ALONG WITH OTHER PEOPLE: SOMEWHAT DIFFICULT
SUM OF ALL RESPONSES TO PHQ QUESTIONS 1-9: 9
SUM OF ALL RESPONSES TO PHQ QUESTIONS 1-9: 9

## 2022-05-11 ASSESSMENT — PATIENT HEALTH QUESTIONNAIRE - PHQ9
SUM OF ALL RESPONSES TO PHQ QUESTIONS 1-9: 9
10. IF YOU CHECKED OFF ANY PROBLEMS, HOW DIFFICULT HAVE THESE PROBLEMS MADE IT FOR YOU TO DO YOUR WORK, TAKE CARE OF THINGS AT HOME, OR GET ALONG WITH OTHER PEOPLE: SOMEWHAT DIFFICULT

## 2022-05-11 ASSESSMENT — ANXIETY QUESTIONNAIRES: GAD7 TOTAL SCORE: 1

## 2022-05-11 ASSESSMENT — PAIN SCALES - GENERAL: PAINLEVEL: NO PAIN (0)

## 2022-05-12 ENCOUNTER — OFFICE VISIT (OUTPATIENT)
Dept: FAMILY MEDICINE | Facility: CLINIC | Age: 57
End: 2022-05-12
Payer: COMMERCIAL

## 2022-05-12 VITALS
RESPIRATION RATE: 16 BRPM | WEIGHT: 315 LBS | BODY MASS INDEX: 53.55 KG/M2 | OXYGEN SATURATION: 96 % | TEMPERATURE: 99.1 F | DIASTOLIC BLOOD PRESSURE: 80 MMHG | SYSTOLIC BLOOD PRESSURE: 132 MMHG | HEART RATE: 65 BPM

## 2022-05-12 DIAGNOSIS — Z86.711 HISTORY OF PULMONARY EMBOLISM: ICD-10-CM

## 2022-05-12 DIAGNOSIS — F33.9 EPISODE OF RECURRENT MAJOR DEPRESSIVE DISORDER, UNSPECIFIED DEPRESSION EPISODE SEVERITY (H): Primary | ICD-10-CM

## 2022-05-12 DIAGNOSIS — E66.01 MORBID OBESITY (H): ICD-10-CM

## 2022-05-12 PROCEDURE — 99214 OFFICE O/P EST MOD 30 MIN: CPT | Performed by: NURSE PRACTITIONER

## 2022-05-12 RX ORDER — BUPROPION HYDROCHLORIDE 300 MG/1
300 TABLET ORAL EVERY MORNING
Qty: 90 TABLET | Refills: 0 | Status: SHIPPED | OUTPATIENT
Start: 2022-05-12 | End: 2022-08-10

## 2022-05-12 RX ORDER — FLUOXETINE 40 MG/1
40 CAPSULE ORAL DAILY
Qty: 90 CAPSULE | Refills: 0 | Status: SHIPPED | OUTPATIENT
Start: 2022-05-12 | End: 2022-06-09 | Stop reason: SINTOL

## 2022-06-08 ASSESSMENT — PATIENT HEALTH QUESTIONNAIRE - PHQ9
SUM OF ALL RESPONSES TO PHQ QUESTIONS 1-9: 0
5. POOR APPETITE OR OVEREATING: NOT AT ALL

## 2022-06-08 ASSESSMENT — ANXIETY QUESTIONNAIRES
6. BECOMING EASILY ANNOYED OR IRRITABLE: NOT AT ALL
3. WORRYING TOO MUCH ABOUT DIFFERENT THINGS: NOT AT ALL
IF YOU CHECKED OFF ANY PROBLEMS ON THIS QUESTIONNAIRE, HOW DIFFICULT HAVE THESE PROBLEMS MADE IT FOR YOU TO DO YOUR WORK, TAKE CARE OF THINGS AT HOME, OR GET ALONG WITH OTHER PEOPLE: NOT DIFFICULT AT ALL
GAD7 TOTAL SCORE: 0
2. NOT BEING ABLE TO STOP OR CONTROL WORRYING: NOT AT ALL
GAD7 TOTAL SCORE: 0
5. BEING SO RESTLESS THAT IT IS HARD TO SIT STILL: NOT AT ALL
1. FEELING NERVOUS, ANXIOUS, OR ON EDGE: NOT AT ALL
7. FEELING AFRAID AS IF SOMETHING AWFUL MIGHT HAPPEN: NOT AT ALL

## 2022-06-08 ASSESSMENT — PAIN SCALES - GENERAL: PAINLEVEL: NO PAIN (0)

## 2022-06-08 NOTE — PROGRESS NOTES
Delbert is a 56 year old who is being evaluated via a billable telephone visit.      What phone number would you like to be contacted at? [phone]   How would you like to obtain your AVS? Aaron    Assessment & Plan     (F33.9) Episode of recurrent major depressive disorder, unspecified depression episode severity (H)  (primary encounter diagnosis)  Comment:   Plan: citalopram (CELEXA) 40 MG tablet        switching back to Citalopram, continue higher dose of Wellbutrin 300 mg XL. Re-check in 6 months.                    No follow-ups on file.    NANDO Ellison CNP  [facility] is a 56 year old who presents for the following health issues     HPI     Patient states he is having nausea from medication   Did not tolerate Prozac 40 mg, even tried at night.   Doing more self cares.   Has been better.   Has been trying to compartmentalize issues.   Has noted this is better.     Depression and Anxiety Follow-Up-    How are you doing with your depression since your last visit? No change    How are you doing with your anxiety since your last visit?  No change    Are you having other symptoms that might be associated with depression or anxiety? No    Have you had a significant life event? No     Do you have any concerns with your use of alcohol or other drugs? No    Social History     Tobacco Use     Smoking status: Former Smoker     Packs/day: 1.00     Years: 20.00     Pack years: 20.00     Types: Cigarettes     Start date: 7/15/1986     Quit date: 7/15/2006     Years since quitting: 15.9     Smokeless tobacco: Never Used     Tobacco comment: quit 13 years    Vaping Use     Vaping Use: Never used   Substance Use Topics     Alcohol use: Yes     Comment: once every 3 months      Drug use: No     Comment: none      PHQ 11/1/2021 5/10/2022 6/8/2022   PHQ-9 Total Score 0 9 0   Q9: Thoughts of better off dead/self-harm past 2 weeks Not at all Not at all Not at all     MYKE-7 SCORE  4/2/2021 5/10/2022 6/8/2022   Total Score 0 (minimal anxiety) 1 (minimal anxiety) -   Total Score 0 1 0     Last PHQ-9 6/8/2022   1.  Little interest or pleasure in doing things 0   2.  Feeling down, depressed, or hopeless 0   3.  Trouble falling or staying asleep, or sleeping too much 0   4.  Feeling tired or having little energy 0   5.  Poor appetite or overeating 0   6.  Feeling bad about yourself 0   7.  Trouble concentrating 0   8.  Moving slowly or restless 0   Q9: Thoughts of better off dead/self-harm past 2 weeks 0   PHQ-9 Total Score 0   Difficulty at work, home, or with people Not difficult at all     MYKE-7  6/8/2022   1. Feeling nervous, anxious, or on edge 0   2. Not being able to stop or control worrying 0   3. Worrying too much about different things 0   4. Trouble relaxing 0   5. Being so restless that it is hard to sit still 0   6. Becoming easily annoyed or irritable 0   7. Feeling afraid, as if something awful might happen 0   MYKE-7 Total Score 0   If you checked any problems, how difficult have they made it for you to do your work, take care of things at home, or get along with other people? Not difficult at all       Suicide Assessment Five-step Evaluation and Treatment (SAFE-T)          Review of Systems   Constitutional, HEENT, cardiovascular, pulmonary, gi and gu systems are negative, except as otherwise noted.      Objective    Vitals - Patient Reported  Pain Score: No Pain (0)      Vitals:  No vitals were obtained today due to virtual visit.    Physical Exam   healthy, alert and no distress  PSYCH: Alert and oriented times 3; coherent speech, normal   rate and volume, able to articulate logical thoughts, able   to abstract reason, no tangential thoughts, no hallucinations   or delusions  His affect is normal and pleasant  RESP: No cough, no audible wheezing, able to talk in full sentences  Remainder of exam unable to be completed due to telephone visits                Phone call duration: 7  minutes

## 2022-06-09 ENCOUNTER — VIRTUAL VISIT (OUTPATIENT)
Dept: FAMILY MEDICINE | Facility: CLINIC | Age: 57
End: 2022-06-09
Payer: COMMERCIAL

## 2022-06-09 DIAGNOSIS — F33.9 EPISODE OF RECURRENT MAJOR DEPRESSIVE DISORDER, UNSPECIFIED DEPRESSION EPISODE SEVERITY (H): Primary | ICD-10-CM

## 2022-06-09 PROCEDURE — 99213 OFFICE O/P EST LOW 20 MIN: CPT | Mod: TEL | Performed by: NURSE PRACTITIONER

## 2022-06-09 RX ORDER — CITALOPRAM HYDROBROMIDE 40 MG/1
40 TABLET ORAL DAILY
Qty: 90 TABLET | Refills: 1 | Status: SHIPPED | OUTPATIENT
Start: 2022-06-09 | End: 2022-12-20

## 2022-08-09 DIAGNOSIS — F33.9 EPISODE OF RECURRENT MAJOR DEPRESSIVE DISORDER, UNSPECIFIED DEPRESSION EPISODE SEVERITY (H): ICD-10-CM

## 2022-08-10 RX ORDER — BUPROPION HYDROCHLORIDE 300 MG/1
TABLET ORAL
Qty: 90 TABLET | Refills: 0 | Status: SHIPPED | OUTPATIENT
Start: 2022-08-10 | End: 2022-08-15 | Stop reason: SINTOL

## 2022-08-14 ENCOUNTER — MYC MEDICAL ADVICE (OUTPATIENT)
Dept: FAMILY MEDICINE | Facility: CLINIC | Age: 57
End: 2022-08-14

## 2022-08-14 DIAGNOSIS — F33.9 EPISODE OF RECURRENT MAJOR DEPRESSIVE DISORDER, UNSPECIFIED DEPRESSION EPISODE SEVERITY (H): Primary | ICD-10-CM

## 2022-08-15 RX ORDER — BUPROPION HYDROCHLORIDE 150 MG/1
150 TABLET ORAL EVERY MORNING
Qty: 90 TABLET | Refills: 1 | Status: SHIPPED | OUTPATIENT
Start: 2022-08-15 | End: 2022-12-20

## 2022-08-15 NOTE — TELEPHONE ENCOUNTER
Wellbutrin was increased during visit on 5/12/22 and patient would like to go back to original dose. Current dose it too much.     See [employer] message.     buPROPion (WELLBUTRIN XL) 300 MG 24 hr tablet    LEANA Hernandez, RN  [facility]  August 15, 2022

## 2022-09-04 DIAGNOSIS — I10 ESSENTIAL HYPERTENSION: ICD-10-CM

## 2022-09-07 RX ORDER — ATENOLOL 100 MG/1
TABLET ORAL
Qty: 90 TABLET | Refills: 0 | Status: SHIPPED | OUTPATIENT
Start: 2022-09-07 | End: 2022-12-20

## 2022-09-07 NOTE — TELEPHONE ENCOUNTER
Pending Prescriptions:                       Disp   Refills    atenolol (TENORMIN) 100 MG tablet [Pharma*90 tab*0            Sig: TAKE 1 TABLET BY MOUTH EVERY DAY    Medication is being filled for 1 time tamara refill only due to:  Patient is due for annual physical in Nov    Please call and help schedule.  Thank you!

## 2022-09-18 ENCOUNTER — HEALTH MAINTENANCE LETTER (OUTPATIENT)
Age: 57
End: 2022-09-18

## 2022-11-10 DIAGNOSIS — I10 ESSENTIAL HYPERTENSION: ICD-10-CM

## 2022-11-14 RX ORDER — HYDROCHLOROTHIAZIDE 25 MG/1
TABLET ORAL
Qty: 90 TABLET | Refills: 0 | Status: SHIPPED | OUTPATIENT
Start: 2022-11-14 | End: 2022-12-20

## 2022-12-14 ASSESSMENT — ENCOUNTER SYMPTOMS
HEMATOCHEZIA: 0
ABDOMINAL PAIN: 0
PARESTHESIAS: 0
HEMATURIA: 0
HEARTBURN: 0
DIZZINESS: 0
SORE THROAT: 0
CONSTIPATION: 0
MYALGIAS: 0
EYE PAIN: 0
COUGH: 0
DIARRHEA: 0
FEVER: 0
SHORTNESS OF BREATH: 0
PALPITATIONS: 0
ARTHRALGIAS: 0
NAUSEA: 0
DYSURIA: 0
WEAKNESS: 0
FREQUENCY: 0
NERVOUS/ANXIOUS: 0
JOINT SWELLING: 0
CHILLS: 0
HEADACHES: 0

## 2022-12-20 ENCOUNTER — OFFICE VISIT (OUTPATIENT)
Dept: FAMILY MEDICINE | Facility: CLINIC | Age: 57
End: 2022-12-20
Payer: COMMERCIAL

## 2022-12-20 VITALS
WEIGHT: 315 LBS | DIASTOLIC BLOOD PRESSURE: 86 MMHG | HEART RATE: 56 BPM | HEIGHT: 71 IN | TEMPERATURE: 98.2 F | OXYGEN SATURATION: 98 % | BODY MASS INDEX: 44.1 KG/M2 | SYSTOLIC BLOOD PRESSURE: 136 MMHG

## 2022-12-20 DIAGNOSIS — Z12.11 SCREENING FOR COLON CANCER: ICD-10-CM

## 2022-12-20 DIAGNOSIS — F33.9 EPISODE OF RECURRENT MAJOR DEPRESSIVE DISORDER, UNSPECIFIED DEPRESSION EPISODE SEVERITY (H): ICD-10-CM

## 2022-12-20 DIAGNOSIS — I10 ESSENTIAL HYPERTENSION: ICD-10-CM

## 2022-12-20 DIAGNOSIS — Z00.00 ROUTINE MEDICAL EXAM: Primary | ICD-10-CM

## 2022-12-20 PROBLEM — I26.99 ACUTE PULMONARY EMBOLISM (H): Status: RESOLVED | Noted: 2019-03-28 | Resolved: 2022-12-20

## 2022-12-20 PROBLEM — I26.99 PULMONARY EMBOLI (H): Status: RESOLVED | Noted: 2019-03-29 | Resolved: 2022-12-20

## 2022-12-20 LAB — PSA SERPL-MCNC: 0.13 UG/L (ref 0–4)

## 2022-12-20 PROCEDURE — G0103 PSA SCREENING: HCPCS | Performed by: NURSE PRACTITIONER

## 2022-12-20 PROCEDURE — 99396 PREV VISIT EST AGE 40-64: CPT | Performed by: NURSE PRACTITIONER

## 2022-12-20 PROCEDURE — 36415 COLL VENOUS BLD VENIPUNCTURE: CPT | Performed by: NURSE PRACTITIONER

## 2022-12-20 RX ORDER — CITALOPRAM HYDROBROMIDE 40 MG/1
40 TABLET ORAL DAILY
Qty: 90 TABLET | Refills: 1 | Status: SHIPPED | OUTPATIENT
Start: 2022-12-20 | End: 2023-05-01

## 2022-12-20 RX ORDER — HYDROCHLOROTHIAZIDE 25 MG/1
TABLET ORAL
Qty: 90 TABLET | Refills: 3 | Status: SHIPPED | OUTPATIENT
Start: 2022-12-20 | End: 2023-02-11

## 2022-12-20 RX ORDER — BUPROPION HYDROCHLORIDE 150 MG/1
150 TABLET ORAL EVERY MORNING
Qty: 90 TABLET | Refills: 1 | Status: SHIPPED | OUTPATIENT
Start: 2022-12-20 | End: 2023-02-09

## 2022-12-20 RX ORDER — ATENOLOL 100 MG/1
100 TABLET ORAL DAILY
Qty: 90 TABLET | Refills: 3 | Status: SHIPPED | OUTPATIENT
Start: 2022-12-20 | End: 2023-08-16

## 2022-12-20 ASSESSMENT — ENCOUNTER SYMPTOMS
DIARRHEA: 0
HEARTBURN: 0
SORE THROAT: 0
FREQUENCY: 0
ARTHRALGIAS: 0
EYE PAIN: 0
SHORTNESS OF BREATH: 0
PARESTHESIAS: 0
MYALGIAS: 0
HEADACHES: 0
JOINT SWELLING: 0
HEMATOCHEZIA: 0
NAUSEA: 0
CHILLS: 0
ABDOMINAL PAIN: 0
DYSURIA: 0
NERVOUS/ANXIOUS: 0
COUGH: 0
WEAKNESS: 0
CONSTIPATION: 0
DIZZINESS: 0
FEVER: 0
HEMATURIA: 0
PALPITATIONS: 0

## 2022-12-20 ASSESSMENT — PAIN SCALES - GENERAL: PAINLEVEL: NO PAIN (0)

## 2022-12-20 NOTE — PROGRESS NOTES
SUBJECTIVE:   CC: Delbert is an 57 year old who presents for preventative health visit.     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 Has plans with a  for weight mgmt. Wants to hold off on OZempic.       Updating labs.     HTN- stable. Working on healthy changes.       Mood- wants to taper down off mood medications. Feels like has a good hold of how to manage life's expectations.   No SI, positive thinking.           Today's PHQ-2 Score:   PHQ-2 (  [employer]) 2022   Q1: Little interest or pleasure in doing things 0   Q2: Feeling down, depressed or hopeless 0   PHQ-2 Score 0   PHQ-2 Total Score (12-17 Years)- Positive if 3 or more points; Administer PHQ-A if positive -   Q1: Little interest or pleasure in doing things Not at all   Q2: Feeling down, depressed or hopeless Not at all   PHQ-2 Score 0           Social History     Tobacco Use     Smoking status: Former     Packs/day: 1.00     Years: 20.00     Pack years: 20.00     Types: Cigarettes     Start date: 7/15/1986     Quit date: 7/15/2006     Years since quittin.4     Smokeless tobacco: Never     Tobacco comments:     quit 13 years    Substance Use Topics     Alcohol use: Yes     Comment: once every 3 months        Alcohol Use 2022   Prescreen: >3 drinks/day or >7 drinks/week? No   Prescreen: >3 drinks/day or >7 drinks/week? -       Last PSA:   PSA   Date Value Ref Range Status   10/30/2020 0.13 0 - 4 ug/L Final     Comment:     Assay Method:  Chemiluminescence using Siemens Vista analyzer       Reviewed orders with patient. Reviewed health maintenance and updated orders accordingly - Yes  Lab  "work is in process    Reviewed and updated as needed this visit by clinical staff   Tobacco  Allergies  Meds              Reviewed and updated as needed this visit by Provider                     Review of Systems   Constitutional: Negative for chills and fever.   HENT: Negative for congestion, ear pain, hearing loss and sore throat.    Eyes: Negative for pain and visual disturbance.   Respiratory: Negative for cough and shortness of breath.    Cardiovascular: Negative for chest pain, palpitations and peripheral edema.   Gastrointestinal: Negative for abdominal pain, constipation, diarrhea, heartburn, hematochezia and nausea.   Genitourinary: Negative for dysuria, frequency, genital sores, hematuria, impotence, penile discharge and urgency.   Musculoskeletal: Negative for arthralgias, joint swelling and myalgias.   Skin: Negative for rash.   Neurological: Negative for dizziness, weakness, headaches and paresthesias.   Psychiatric/Behavioral: Negative for mood changes. The patient is not nervous/anxious.      CONSTITUTIONAL: NEGATIVE for fever, chills, change in weight  INTEGUMENTARY/SKIN: NEGATIVE for worrisome rashes, moles or lesions  EYES: NEGATIVE for vision changes or irritation  ENT: NEGATIVE for ear, mouth and throat problems  RESP: NEGATIVE for significant cough or SOB  CV: NEGATIVE for chest pain, palpitations or peripheral edema  GI: NEGATIVE for nausea, abdominal pain, heartburn, or change in bowel habits   male: negative for dysuria, hematuria, decreased urinary stream, erectile dysfunction, urethral discharge  MUSCULOSKELETAL: NEGATIVE for significant arthralgias or myalgia  NEURO: NEGATIVE for weakness, dizziness or paresthesias  PSYCHIATRIC: NEGATIVE for changes in mood or affect    OBJECTIVE:   /86 (BP Location: Left arm, Patient Position: Chair, Cuff Size: Adult Large)   Pulse 56   Temp 98.2  F (36.8  C) (Temporal)   Ht 1.804 m (5' 11.02\")   Wt (!) 173.5 kg (382 lb 6.4 oz)   SpO2 98% "   BMI 53.30 kg/m      Physical Exam  GENERAL: healthy, alert and no distress  EYES: Eyes grossly normal to inspection, PERRL and conjunctivae and sclerae normal  HENT: ear canals and TM's normal, nose and mouth without ulcers or lesions  NECK: no adenopathy, no asymmetry, masses, or scars and thyroid normal to palpation  RESP: lungs clear to auscultation - no rales, rhonchi or wheezes  CV: regular rate and rhythm, normal S1 S2, no S3 or S4, no murmur, click or rub, no peripheral edema and peripheral pulses strong  ABDOMEN: soft, obese,  nontender, without hepatosplenomegaly or masses and bowel sounds normal   (male): no hernias  RECTAL: deferred  MS: no gross musculoskeletal defects noted, no edema  SKIN: no suspicious lesions or rashes  NEURO: Normal strength and tone, mentation intact and speech normal  PSYCH: mentation appears normal, affect normal/bright    Diagnostic Test Results:  Labs reviewed in Epic    ASSESSMENT/PLAN:       ICD-10-CM    1. Routine medical exam  Z00.00 PSA, screen     PSA, screen      2. Episode of recurrent major depressive disorder, unspecified depression episode severity (H)  F33.9 buPROPion (WELLBUTRIN XL) 150 MG 24 hr tablet     citalopram (CELEXA) 40 MG tablet      3. Essential hypertension  I10 hydrochlorothiazide (HYDRODIURIL) 25 MG tablet     atenolol (TENORMIN) 100 MG tablet      4. Screening for colon cancer  Z12.11 Colonoscopy Screening  Referral          Patient has been advised of split billing requirements and indicates understanding: No      COUNSELING:   Reviewed preventive health counseling, as reflected in patient instructions        COUNSELING:   Reviewed preventive health counseling, as reflected in patient instructions       Regular exercise       Healthy diet/nutrition       Immunizations    Declined al.              Colorectal Cancer Screening       - prostate cancer screening.       Weight management plan: Discussed healthy diet and exercise guidelines  plan in place.     He reports that he quit smoking about 16 years ago. His smoking use included cigarettes. He started smoking about 36 years ago. He has a 20.00 pack-year smoking history. He has never used smokeless tobacco.        NANDO Ellison CNP  [facility]

## 2022-12-20 NOTE — RESULT ENCOUNTER NOTE
Delbert,  I have reviewed your labs, and they are all normal. If you have questions, please notify me through MyChart or a telephone call.   Lissa Gamez, DNP

## 2023-02-08 DIAGNOSIS — F33.9 EPISODE OF RECURRENT MAJOR DEPRESSIVE DISORDER, UNSPECIFIED DEPRESSION EPISODE SEVERITY (H): ICD-10-CM

## 2023-02-09 RX ORDER — BUPROPION HYDROCHLORIDE 150 MG/1
TABLET ORAL
Qty: 90 TABLET | Refills: 1 | Status: SHIPPED | OUTPATIENT
Start: 2023-02-09 | End: 2023-05-01

## 2023-04-26 ASSESSMENT — PATIENT HEALTH QUESTIONNAIRE - PHQ9
10. IF YOU CHECKED OFF ANY PROBLEMS, HOW DIFFICULT HAVE THESE PROBLEMS MADE IT FOR YOU TO DO YOUR WORK, TAKE CARE OF THINGS AT HOME, OR GET ALONG WITH OTHER PEOPLE: NOT DIFFICULT AT ALL
SUM OF ALL RESPONSES TO PHQ QUESTIONS 1-9: 0
SUM OF ALL RESPONSES TO PHQ QUESTIONS 1-9: 0

## 2023-05-01 ENCOUNTER — OFFICE VISIT (OUTPATIENT)
Dept: FAMILY MEDICINE | Facility: CLINIC | Age: 58
End: 2023-05-01
Payer: COMMERCIAL

## 2023-05-01 VITALS
TEMPERATURE: 97.1 F | SYSTOLIC BLOOD PRESSURE: 132 MMHG | HEART RATE: 55 BPM | OXYGEN SATURATION: 97 % | WEIGHT: 315 LBS | HEIGHT: 71 IN | DIASTOLIC BLOOD PRESSURE: 81 MMHG | BODY MASS INDEX: 44.1 KG/M2

## 2023-05-01 DIAGNOSIS — Z00.00 ROUTINE MEDICAL EXAM: ICD-10-CM

## 2023-05-01 DIAGNOSIS — E66.01 MORBID OBESITY (H): ICD-10-CM

## 2023-05-01 DIAGNOSIS — F33.9 EPISODE OF RECURRENT MAJOR DEPRESSIVE DISORDER, UNSPECIFIED DEPRESSION EPISODE SEVERITY (H): Primary | ICD-10-CM

## 2023-05-01 LAB
ALBUMIN SERPL-MCNC: 3.7 G/DL (ref 3.4–5)
ALP SERPL-CCNC: 52 U/L (ref 40–150)
ALT SERPL W P-5'-P-CCNC: 32 U/L (ref 0–70)
ANION GAP SERPL CALCULATED.3IONS-SCNC: 5 MMOL/L (ref 3–14)
AST SERPL W P-5'-P-CCNC: 22 U/L (ref 0–45)
BASOPHILS # BLD AUTO: 0 10E3/UL (ref 0–0.2)
BASOPHILS NFR BLD AUTO: 0 %
BILIRUB SERPL-MCNC: 0.5 MG/DL (ref 0.2–1.3)
BUN SERPL-MCNC: 17 MG/DL (ref 7–30)
CALCIUM SERPL-MCNC: 9 MG/DL (ref 8.5–10.1)
CHLORIDE BLD-SCNC: 106 MMOL/L (ref 94–109)
CHOLEST SERPL-MCNC: 129 MG/DL
CO2 SERPL-SCNC: 29 MMOL/L (ref 20–32)
CREAT SERPL-MCNC: 0.91 MG/DL (ref 0.66–1.25)
EOSINOPHIL # BLD AUTO: 0.1 10E3/UL (ref 0–0.7)
EOSINOPHIL NFR BLD AUTO: 1 %
ERYTHROCYTE [DISTWIDTH] IN BLOOD BY AUTOMATED COUNT: 12.1 % (ref 10–15)
FASTING STATUS PATIENT QL REPORTED: ABNORMAL
GFR SERPL CREATININE-BSD FRML MDRD: >90 ML/MIN/1.73M2
GLUCOSE BLD-MCNC: 125 MG/DL (ref 70–99)
HBA1C MFR BLD: 5.7 % (ref 0–5.6)
HCT VFR BLD AUTO: 42.9 % (ref 40–53)
HDLC SERPL-MCNC: 49 MG/DL
HGB BLD-MCNC: 14.6 G/DL (ref 13.3–17.7)
IMM GRANULOCYTES # BLD: 0 10E3/UL
IMM GRANULOCYTES NFR BLD: 0 %
LDLC SERPL CALC-MCNC: 50 MG/DL
LYMPHOCYTES # BLD AUTO: 1.6 10E3/UL (ref 0.8–5.3)
LYMPHOCYTES NFR BLD AUTO: 21 %
MCH RBC QN AUTO: 31.1 PG (ref 26.5–33)
MCHC RBC AUTO-ENTMCNC: 34 G/DL (ref 31.5–36.5)
MCV RBC AUTO: 91 FL (ref 78–100)
MONOCYTES # BLD AUTO: 0.5 10E3/UL (ref 0–1.3)
MONOCYTES NFR BLD AUTO: 6 %
NEUTROPHILS # BLD AUTO: 5.3 10E3/UL (ref 1.6–8.3)
NEUTROPHILS NFR BLD AUTO: 71 %
NONHDLC SERPL-MCNC: 80 MG/DL
PLATELET # BLD AUTO: 176 10E3/UL (ref 150–450)
POTASSIUM BLD-SCNC: 4.5 MMOL/L (ref 3.4–5.3)
PROT SERPL-MCNC: 6.9 G/DL (ref 6.8–8.8)
PSA SERPL-MCNC: 0.13 UG/L (ref 0–4)
RBC # BLD AUTO: 4.7 10E6/UL (ref 4.4–5.9)
SODIUM SERPL-SCNC: 140 MMOL/L (ref 133–144)
TRIGL SERPL-MCNC: 150 MG/DL
TSH SERPL DL<=0.005 MIU/L-ACNC: 1.54 MU/L (ref 0.4–4)
WBC # BLD AUTO: 7.5 10E3/UL (ref 4–11)

## 2023-05-01 PROCEDURE — 83036 HEMOGLOBIN GLYCOSYLATED A1C: CPT | Performed by: NURSE PRACTITIONER

## 2023-05-01 PROCEDURE — 80061 LIPID PANEL: CPT | Performed by: NURSE PRACTITIONER

## 2023-05-01 PROCEDURE — 99214 OFFICE O/P EST MOD 30 MIN: CPT | Performed by: NURSE PRACTITIONER

## 2023-05-01 PROCEDURE — G0103 PSA SCREENING: HCPCS | Performed by: NURSE PRACTITIONER

## 2023-05-01 PROCEDURE — 80050 GENERAL HEALTH PANEL: CPT | Performed by: NURSE PRACTITIONER

## 2023-05-01 PROCEDURE — 96127 BRIEF EMOTIONAL/BEHAV ASSMT: CPT | Performed by: NURSE PRACTITIONER

## 2023-05-01 PROCEDURE — 36415 COLL VENOUS BLD VENIPUNCTURE: CPT | Performed by: NURSE PRACTITIONER

## 2023-05-01 RX ORDER — CITALOPRAM HYDROBROMIDE 40 MG/1
40 TABLET ORAL DAILY
Qty: 90 TABLET | Refills: 1 | Status: SHIPPED | OUTPATIENT
Start: 2023-05-01 | End: 2023-08-16

## 2023-05-01 RX ORDER — BUPROPION HYDROCHLORIDE 150 MG/1
150 TABLET ORAL EVERY MORNING
Qty: 90 TABLET | Refills: 1 | Status: SHIPPED | OUTPATIENT
Start: 2023-05-01 | End: 2023-08-16

## 2023-05-01 ASSESSMENT — PATIENT HEALTH QUESTIONNAIRE - PHQ9
SUM OF ALL RESPONSES TO PHQ QUESTIONS 1-9: 0
10. IF YOU CHECKED OFF ANY PROBLEMS, HOW DIFFICULT HAVE THESE PROBLEMS MADE IT FOR YOU TO DO YOUR WORK, TAKE CARE OF THINGS AT HOME, OR GET ALONG WITH OTHER PEOPLE: NOT DIFFICULT AT ALL

## 2023-05-01 ASSESSMENT — PAIN SCALES - GENERAL: PAINLEVEL: NO PAIN (0)

## 2023-05-01 NOTE — PROGRESS NOTES
Assessment & Plan     Episode of recurrent major depressive disorder, unspecified depression episode severity (H)  Continue plan, improving. Discussed working on self and new routines for overall health. Hobby developing, etc.   Re-check in 6 months, virtual is OK  - buPROPion (WELLBUTRIN XL) 150 MG 24 hr tablet; Take 1 tablet (150 mg) by mouth every morning  - citalopram (CELEXA) 40 MG tablet; Take 1 tablet (40 mg) by mouth daily    Morbid obesity (H)  As above, using Cpap,   Declines referral.     Routine medical exam  Updating labs. Forgotten at last visit.   - CBC with Platelets & Differential; Future  - Lipid panel reflex to direct LDL Fasting; Future  - Hemoglobin A1c; Future  - TSH with free T4 reflex; Future  - Comprehensive metabolic panel; Future  - Prostate Specific Antigen Screen; Future  - CBC with Platelets & Differential  - Lipid panel reflex to direct LDL Fasting  - Hemoglobin A1c  - TSH with free T4 reflex  - Comprehensive metabolic panel  - Prostate Specific Antigen Screen             MEDICATIONS:  Continue current medications without change    NANDO Ellison [facility] DAMARIS Mandujano is a 57 year old, presenting for the following health issues:  Depression        5/1/2023     9:00 AM   Additional Questions   Roomed by Radha BURT   Accompanied by None     History of Present Illness       Mental Health Follow-up:  Patient presents to follow-up on Depression.Patient's depression since last visit has been:  Good  The patient is not having other symptoms associated with depression.      Any significant life events: No  Patient is not feeling anxious or having panic attacks.  Patient has no concerns about alcohol or drug use.    He eats 2-3 servings of fruits and vegetables daily.He consumes 1 sweetened beverage(s) daily.     Today's PHQ-9         PHQ-9 Total Score: 0    PHQ-9 Q9 Thoughts of better off dead/self-harm past 2 weeks :   Not at all    How difficult have  "these problems made it for you to do your work, take care of things at home, or get along with other people: Not difficult at all       Medication Followup of wellbutrin and Celexa     Taking Medication as prescribed: yes    Side Effects:  None    Medication Helping Symptoms:  yes    Due for routine PE labs.     Sleeping well.     Keeping active.     Exercise-needs to start.     Eating- healthy          Review of Systems   Constitutional, HEENT, cardiovascular, pulmonary, gi and gu systems are negative, except as otherwise noted.      Objective    /81 (BP Location: Left arm, Patient Position: Chair, Cuff Size: Adult Large)   Pulse 55   Temp 97.1  F (36.2  C) (Temporal)   Ht 1.812 m (5' 11.34\")   Wt (!) 168.4 kg (371 lb 4.8 oz)   SpO2 97%   BMI 51.29 kg/m    Body mass index is 51.29 kg/m .  Physical Exam   GENERAL: healthy, alert and no distress  NEURO: Normal strength and tone, mentation intact and speech normal  PSYCH: mentation appears normal, affect normal/bright    Results for orders placed or performed in visit on 05/01/23   Lipid panel reflex to direct LDL Fasting     Status: Abnormal   Result Value Ref Range    Cholesterol 129 <200 mg/dL    Triglycerides 150 (H) <150 mg/dL    Direct Measure HDL 49 >=40 mg/dL    LDL Cholesterol Calculated 50 <=100 mg/dL    Non HDL Cholesterol 80 <130 mg/dL    Patient Fasting > 8hrs? Unknown     Narrative    Cholesterol  Desirable:  <200 mg/dL    Triglycerides  Normal:  Less than 150 mg/dL  Borderline High:  150-199 mg/dL  High:  200-499 mg/dL  Very High:  Greater than or equal to 500 mg/dL    Direct Measure HDL  Female:  Greater than or equal to 50 mg/dL   Male:  Greater than or equal to 40 mg/dL    LDL Cholesterol  Desirable:  <100mg/dL  Above Desirable:  100-129 mg/dL   Borderline High:  130-159 mg/dL   High:  160-189 mg/dL   Very High:  >= 190 mg/dL    Non HDL Cholesterol  Desirable:  130 mg/dL  Above Desirable:  130-159 mg/dL  Borderline High:  160-189 " mg/dL  High:  190-219 mg/dL  Very High:  Greater than or equal to 220 mg/dL   Hemoglobin A1c     Status: Abnormal   Result Value Ref Range    Hemoglobin A1C 5.7 (H) 0.0 - 5.6 %   TSH with free T4 reflex     Status: Normal   Result Value Ref Range    TSH 1.54 0.40 - 4.00 mU/L   Comprehensive metabolic panel     Status: Abnormal   Result Value Ref Range    Sodium 140 133 - 144 mmol/L    Potassium 4.5 3.4 - 5.3 mmol/L    Chloride 106 94 - 109 mmol/L    Carbon Dioxide (CO2) 29 20 - 32 mmol/L    Anion Gap 5 3 - 14 mmol/L    Urea Nitrogen 17 7 - 30 mg/dL    Creatinine 0.91 0.66 - 1.25 mg/dL    Calcium 9.0 8.5 - 10.1 mg/dL    Glucose 125 (H) 70 - 99 mg/dL    Alkaline Phosphatase 52 40 - 150 U/L    AST 22 0 - 45 U/L    ALT 32 0 - 70 U/L    Protein Total 6.9 6.8 - 8.8 g/dL    Albumin 3.7 3.4 - 5.0 g/dL    Bilirubin Total 0.5 0.2 - 1.3 mg/dL    GFR Estimate >90 >60 mL/min/1.73m2   Prostate Specific Antigen Screen     Status: Normal   Result Value Ref Range    Prostate Specific Antigen Screen 0.13 0.00 - 4.00 ug/L   CBC with platelets and differential     Status: None   Result Value Ref Range    WBC Count 7.5 4.0 - 11.0 10e3/uL    RBC Count 4.70 4.40 - 5.90 10e6/uL    Hemoglobin 14.6 13.3 - 17.7 g/dL    Hematocrit 42.9 40.0 - 53.0 %    MCV 91 78 - 100 fL    MCH 31.1 26.5 - 33.0 pg    MCHC 34.0 31.5 - 36.5 g/dL    RDW 12.1 10.0 - 15.0 %    Platelet Count 176 150 - 450 10e3/uL    % Neutrophils 71 %    % Lymphocytes 21 %    % Monocytes 6 %    % Eosinophils 1 %    % Basophils 0 %    % Immature Granulocytes 0 %    Absolute Neutrophils 5.3 1.6 - 8.3 10e3/uL    Absolute Lymphocytes 1.6 0.8 - 5.3 10e3/uL    Absolute Monocytes 0.5 0.0 - 1.3 10e3/uL    Absolute Eosinophils 0.1 0.0 - 0.7 10e3/uL    Absolute Basophils 0.0 0.0 - 0.2 10e3/uL    Absolute Immature Granulocytes 0.0 <=0.4 10e3/uL   CBC with Platelets & Differential     Status: None    Narrative    The following orders were created for panel order CBC with Platelets &  Differential.  Procedure                               Abnormality         Status                     ---------                               -----------         ------                     CBC with platelets and d...[[id number]]                      Final result                 Please view results for these tests on the individual orders.

## 2023-08-16 ENCOUNTER — MYC MEDICAL ADVICE (OUTPATIENT)
Dept: FAMILY MEDICINE | Facility: CLINIC | Age: 58
End: 2023-08-16
Payer: COMMERCIAL

## 2023-08-16 DIAGNOSIS — I10 ESSENTIAL HYPERTENSION: ICD-10-CM

## 2023-08-16 DIAGNOSIS — F33.9 EPISODE OF RECURRENT MAJOR DEPRESSIVE DISORDER, UNSPECIFIED DEPRESSION EPISODE SEVERITY (H): ICD-10-CM

## 2023-08-17 RX ORDER — ATENOLOL 100 MG/1
100 TABLET ORAL DAILY
Qty: 90 TABLET | Refills: 0 | Status: SHIPPED | OUTPATIENT
Start: 2023-08-17 | End: 2023-11-30

## 2023-08-17 RX ORDER — CITALOPRAM HYDROBROMIDE 40 MG/1
40 TABLET ORAL DAILY
Qty: 90 TABLET | Refills: 0 | Status: SHIPPED | OUTPATIENT
Start: 2023-08-17 | End: 2023-12-05

## 2023-08-17 RX ORDER — HYDROCHLOROTHIAZIDE 25 MG/1
TABLET ORAL
Qty: 90 TABLET | Refills: 0 | Status: SHIPPED | OUTPATIENT
Start: 2023-08-17 | End: 2023-11-30

## 2023-08-17 RX ORDER — BUPROPION HYDROCHLORIDE 150 MG/1
150 TABLET ORAL EVERY MORNING
Qty: 90 TABLET | Refills: 0 | Status: SHIPPED | OUTPATIENT
Start: 2023-08-17 | End: 2023-11-30

## 2023-11-20 ENCOUNTER — PATIENT OUTREACH (OUTPATIENT)
Dept: CARE COORDINATION | Facility: CLINIC | Age: 58
End: 2023-11-20
Payer: COMMERCIAL

## 2023-11-30 DIAGNOSIS — F33.9 EPISODE OF RECURRENT MAJOR DEPRESSIVE DISORDER, UNSPECIFIED DEPRESSION EPISODE SEVERITY (H): ICD-10-CM

## 2023-11-30 DIAGNOSIS — I10 ESSENTIAL HYPERTENSION: ICD-10-CM

## 2023-11-30 RX ORDER — BUPROPION HYDROCHLORIDE 150 MG/1
150 TABLET ORAL EVERY MORNING
Qty: 30 TABLET | Refills: 0 | Status: SHIPPED | OUTPATIENT
Start: 2023-11-30 | End: 2023-12-21

## 2023-11-30 RX ORDER — ATENOLOL 100 MG/1
100 TABLET ORAL DAILY
Qty: 90 TABLET | Refills: 1 | Status: SHIPPED | OUTPATIENT
Start: 2023-11-30 | End: 2023-12-21

## 2023-12-04 ENCOUNTER — PATIENT OUTREACH (OUTPATIENT)
Dept: CARE COORDINATION | Facility: CLINIC | Age: 58
End: 2023-12-04
Payer: COMMERCIAL

## 2023-12-05 RX ORDER — CITALOPRAM HYDROBROMIDE 40 MG/1
40 TABLET ORAL DAILY
Qty: 90 TABLET | Refills: 0 | Status: SHIPPED | OUTPATIENT
Start: 2023-12-05 | End: 2023-12-21

## 2023-12-21 ENCOUNTER — OFFICE VISIT (OUTPATIENT)
Dept: FAMILY MEDICINE | Facility: CLINIC | Age: 58
End: 2023-12-21
Payer: COMMERCIAL

## 2023-12-21 VITALS
SYSTOLIC BLOOD PRESSURE: 130 MMHG | WEIGHT: 315 LBS | OXYGEN SATURATION: 96 % | HEIGHT: 71 IN | TEMPERATURE: 98.4 F | DIASTOLIC BLOOD PRESSURE: 84 MMHG | HEART RATE: 60 BPM | BODY MASS INDEX: 44.1 KG/M2 | RESPIRATION RATE: 16 BRPM

## 2023-12-21 DIAGNOSIS — I10 ESSENTIAL HYPERTENSION: ICD-10-CM

## 2023-12-21 DIAGNOSIS — Z12.11 SCREEN FOR COLON CANCER: ICD-10-CM

## 2023-12-21 DIAGNOSIS — F33.9 EPISODE OF RECURRENT MAJOR DEPRESSIVE DISORDER, UNSPECIFIED DEPRESSION EPISODE SEVERITY (H): ICD-10-CM

## 2023-12-21 DIAGNOSIS — Z00.00 ROUTINE MEDICAL EXAM: Primary | ICD-10-CM

## 2023-12-21 PROCEDURE — 99396 PREV VISIT EST AGE 40-64: CPT | Performed by: NURSE PRACTITIONER

## 2023-12-21 RX ORDER — BUPROPION HYDROCHLORIDE 150 MG/1
150 TABLET ORAL EVERY MORNING
Qty: 90 TABLET | Refills: 1 | Status: SHIPPED | OUTPATIENT
Start: 2023-12-21 | End: 2024-05-20

## 2023-12-21 RX ORDER — ATENOLOL 100 MG/1
100 TABLET ORAL DAILY
Qty: 90 TABLET | Refills: 3 | Status: SHIPPED | OUTPATIENT
Start: 2023-12-21

## 2023-12-21 RX ORDER — CITALOPRAM HYDROBROMIDE 40 MG/1
40 TABLET ORAL DAILY
Qty: 90 TABLET | Refills: 1 | Status: SHIPPED | OUTPATIENT
Start: 2023-12-21 | End: 2024-05-20

## 2023-12-21 RX ORDER — HYDROCHLOROTHIAZIDE 25 MG/1
TABLET ORAL
Qty: 90 TABLET | Refills: 3 | Status: SHIPPED | OUTPATIENT
Start: 2023-12-21

## 2023-12-21 ASSESSMENT — PATIENT HEALTH QUESTIONNAIRE - PHQ9
SUM OF ALL RESPONSES TO PHQ QUESTIONS 1-9: 0
SUM OF ALL RESPONSES TO PHQ QUESTIONS 1-9: 0

## 2023-12-21 ASSESSMENT — PAIN SCALES - GENERAL: PAINLEVEL: NO PAIN (0)

## 2024-05-18 DIAGNOSIS — F33.9 EPISODE OF RECURRENT MAJOR DEPRESSIVE DISORDER, UNSPECIFIED DEPRESSION EPISODE SEVERITY (H): ICD-10-CM

## 2024-05-20 RX ORDER — CITALOPRAM HYDROBROMIDE 40 MG/1
40 TABLET ORAL DAILY
Qty: 90 TABLET | Refills: 0 | Status: SHIPPED | OUTPATIENT
Start: 2024-05-20 | End: 2024-06-06

## 2024-05-20 RX ORDER — BUPROPION HYDROCHLORIDE 150 MG/1
150 TABLET ORAL EVERY MORNING
Qty: 90 TABLET | Refills: 0 | Status: SHIPPED | OUTPATIENT
Start: 2024-05-20 | End: 2024-06-06

## 2024-06-01 ASSESSMENT — ANXIETY QUESTIONNAIRES
GAD7 TOTAL SCORE: 0
1. FEELING NERVOUS, ANXIOUS, OR ON EDGE: NOT AT ALL
8. IF YOU CHECKED OFF ANY PROBLEMS, HOW DIFFICULT HAVE THESE MADE IT FOR YOU TO DO YOUR WORK, TAKE CARE OF THINGS AT HOME, OR GET ALONG WITH OTHER PEOPLE?: NOT DIFFICULT AT ALL
6. BECOMING EASILY ANNOYED OR IRRITABLE: NOT AT ALL
3. WORRYING TOO MUCH ABOUT DIFFERENT THINGS: NOT AT ALL
2. NOT BEING ABLE TO STOP OR CONTROL WORRYING: NOT AT ALL
GAD7 TOTAL SCORE: 0
5. BEING SO RESTLESS THAT IT IS HARD TO SIT STILL: NOT AT ALL
7. FEELING AFRAID AS IF SOMETHING AWFUL MIGHT HAPPEN: NOT AT ALL
4. TROUBLE RELAXING: NOT AT ALL
7. FEELING AFRAID AS IF SOMETHING AWFUL MIGHT HAPPEN: NOT AT ALL
IF YOU CHECKED OFF ANY PROBLEMS ON THIS QUESTIONNAIRE, HOW DIFFICULT HAVE THESE PROBLEMS MADE IT FOR YOU TO DO YOUR WORK, TAKE CARE OF THINGS AT HOME, OR GET ALONG WITH OTHER PEOPLE: NOT DIFFICULT AT ALL

## 2024-06-06 ENCOUNTER — OFFICE VISIT (OUTPATIENT)
Dept: FAMILY MEDICINE | Facility: CLINIC | Age: 59
End: 2024-06-06

## 2024-06-06 VITALS
WEIGHT: 315 LBS | HEART RATE: 56 BPM | HEIGHT: 71 IN | DIASTOLIC BLOOD PRESSURE: 90 MMHG | SYSTOLIC BLOOD PRESSURE: 130 MMHG | RESPIRATION RATE: 16 BRPM | OXYGEN SATURATION: 98 % | BODY MASS INDEX: 44.1 KG/M2 | TEMPERATURE: 97.3 F

## 2024-06-06 DIAGNOSIS — E66.01 MORBID OBESITY (H): ICD-10-CM

## 2024-06-06 DIAGNOSIS — F33.9 EPISODE OF RECURRENT MAJOR DEPRESSIVE DISORDER, UNSPECIFIED DEPRESSION EPISODE SEVERITY (H): Primary | ICD-10-CM

## 2024-06-06 DIAGNOSIS — I10 ESSENTIAL HYPERTENSION: ICD-10-CM

## 2024-06-06 DIAGNOSIS — Z12.11 SCREEN FOR COLON CANCER: ICD-10-CM

## 2024-06-06 PROCEDURE — 99214 OFFICE O/P EST MOD 30 MIN: CPT | Performed by: NURSE PRACTITIONER

## 2024-06-06 PROCEDURE — G2211 COMPLEX E/M VISIT ADD ON: HCPCS | Performed by: NURSE PRACTITIONER

## 2024-06-06 PROCEDURE — 96127 BRIEF EMOTIONAL/BEHAV ASSMT: CPT | Performed by: NURSE PRACTITIONER

## 2024-06-06 RX ORDER — BUPROPION HYDROCHLORIDE 150 MG/1
150 TABLET ORAL EVERY MORNING
Qty: 90 TABLET | Refills: 1 | Status: SHIPPED | OUTPATIENT
Start: 2024-06-06

## 2024-06-06 RX ORDER — CITALOPRAM HYDROBROMIDE 40 MG/1
40 TABLET ORAL DAILY
Qty: 90 TABLET | Refills: 1 | Status: SHIPPED | OUTPATIENT
Start: 2024-06-06

## 2024-06-06 RX ORDER — METOPROLOL SUCCINATE 100 MG/1
100 TABLET, EXTENDED RELEASE ORAL DAILY
Qty: 90 TABLET | Refills: 0 | Status: SHIPPED | OUTPATIENT
Start: 2024-06-06 | End: 2024-08-12

## 2024-06-06 ASSESSMENT — PATIENT HEALTH QUESTIONNAIRE - PHQ9: SUM OF ALL RESPONSES TO PHQ QUESTIONS 1-9: 0

## 2024-06-06 NOTE — PROGRESS NOTES
"  Assessment & Plan     Episode of recurrent major depressive disorder, unspecified depression episode severity (H24)  Mood symptoms addressed. Symptoms controlled/stable.  Continue plan.  Healthy self cares.  Will refill as necessary in between visits.  Routine follow-up is every 6 months.  Counseling, as needed.  Patient advised to follow-up if worsening symptoms.  - buPROPion (WELLBUTRIN XL) 150 MG 24 hr tablet; Take 1 tablet (150 mg) by mouth every morning  - citalopram (CELEXA) 40 MG tablet; Take 1 tablet (40 mg) by mouth daily    Screen for colon cancer  Pt. Will schedule after looking at insurance costs.     Essential hypertension  Uncontrolled.   Switching from Atenolol to Toprol. Warning signs discussed.   Healthy habits.   BP re-check in 2 weeks.   - metoprolol succinate ER (TOPROL XL) 100 MG 24 hr tablet; Take 1 tablet (100 mg) by mouth daily    Morbid obesity (H)  Discussed aid, referral- pt. Declines. Helathy habits reinforced.     Return to clinic with any new or worsening symptoms, and as needed.           BMI  Estimated body mass index is 52.72 kg/m  as calculated from the following:    Height as of this encounter: 1.813 m (5' 11.38\").    Weight as of this encounter: 173.3 kg (382 lb).   Weight management plan: Discussed healthy diet and exercise guidelines      MEDICATIONS:        - Continue other medications without change    The longitudinal plan of care for the diagnosis(es)/condition(s) as documented were addressed during this visit. Due to the added complexity in care, I will continue to support Delbert in the subsequent management and with ongoing continuity of care.    Subjective   Delbert is a 58 year old, presenting for the following health issues:  Depression and Hypertension    History of Present Illness       Mental Health Follow-up:  Patient presents to follow-up on Depression.Patient's depression since last visit has been:  No change  The patient is not having other symptoms associated with " "depression.      Any significant life events: No  Patient is not feeling anxious or having panic attacks.  Patient has no concerns about alcohol or drug use.    Hypertension: He presents for follow up of hypertension.  He does not check blood pressure  regularly outside of the clinic. Outpatient blood pressures have not been over 140/90. He does not follow a low salt diet.     He eats 0-1 servings of fruits and vegetables daily.He consumes 0 sweetened beverage(s) daily.He exercises with enough effort to increase his heart rate 9 or less minutes per day.  He exercises with enough effort to increase his heart rate 3 or less days per week.   He is taking medications regularly.     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   Life stressors are medical insurance.     HTN- elevated today. + risk factors. No CP, no SOB.   Denies edema      Will look at colon cancer screening with medical coverage.     Obesity- pt. Not interested in weight mgmt referral/medications.           Review of Systems  Constitutional, HEENT, cardiovascular, pulmonary, gi and gu systems are negative, except as otherwise noted.      Objective    BP (!) 130/90   Pulse 56   Temp 97.3  F (36.3  C) (Temporal)   Resp 16   Ht 1.813 m (5' 11.38\")   Wt (!) 173.3 kg (382 lb)   SpO2 98%   BMI 52.72 kg/m    Body mass index is 52.72 kg/m .  Physical Exam   GENERAL: alert and no distress  NECK: no adenopathy, no asymmetry, masses, or scars  RESP: lungs clear to auscultation - no rales, rhonchi or wheezes  CV: regular rate and rhythm, normal S1 S2, no S3 or S4, no murmur, click or rub, no peripheral edema  ABDOMEN: soft, nontender, no hepatosplenomegaly, no masses and bowel sounds normal  MS: no gross musculoskeletal defects noted, no edema  NEURO: Normal strength and tone, mentation intact and speech normal  PSYCH: mentation appears normal, affect " normal/bright            Signed Electronically by: NANDO Ellison CNP

## 2024-06-21 ENCOUNTER — ALLIED HEALTH/NURSE VISIT (OUTPATIENT)
Dept: FAMILY MEDICINE | Facility: CLINIC | Age: 59
End: 2024-06-21

## 2024-06-21 VITALS — SYSTOLIC BLOOD PRESSURE: 136 MMHG | DIASTOLIC BLOOD PRESSURE: 80 MMHG

## 2024-06-21 DIAGNOSIS — I10 ESSENTIAL HYPERTENSION: Primary | ICD-10-CM

## 2024-06-21 PROCEDURE — 99207 PR NO CHARGE NURSE ONLY: CPT

## 2024-06-21 NOTE — PROGRESS NOTES
Delbert Dowling is a 58 year old patient who comes in today for a Blood Pressure check.  Initial BP:  /80      Data Unavailable  Disposition: results routed to provider

## 2024-07-11 DIAGNOSIS — I10 ESSENTIAL HYPERTENSION: ICD-10-CM

## 2024-07-12 RX ORDER — METOPROLOL SUCCINATE 100 MG/1
100 TABLET, EXTENDED RELEASE ORAL DAILY
Qty: 90 TABLET | Refills: 0 | OUTPATIENT
Start: 2024-07-12

## 2024-07-16 ENCOUNTER — MYC REFILL (OUTPATIENT)
Dept: FAMILY MEDICINE | Facility: CLINIC | Age: 59
End: 2024-07-16

## 2024-07-16 DIAGNOSIS — I10 ESSENTIAL HYPERTENSION: ICD-10-CM

## 2024-07-17 RX ORDER — METOPROLOL SUCCINATE 100 MG/1
100 TABLET, EXTENDED RELEASE ORAL DAILY
Qty: 90 TABLET | Refills: 0 | OUTPATIENT
Start: 2024-07-17

## 2024-08-10 DIAGNOSIS — I10 ESSENTIAL HYPERTENSION: ICD-10-CM

## 2024-08-12 RX ORDER — METOPROLOL SUCCINATE 100 MG/1
100 TABLET, EXTENDED RELEASE ORAL DAILY
Qty: 90 TABLET | Refills: 0 | Status: SHIPPED | OUTPATIENT
Start: 2024-08-12

## 2024-10-21 DIAGNOSIS — I10 ESSENTIAL HYPERTENSION: ICD-10-CM

## 2024-10-21 DIAGNOSIS — F33.9 EPISODE OF RECURRENT MAJOR DEPRESSIVE DISORDER, UNSPECIFIED DEPRESSION EPISODE SEVERITY (H): ICD-10-CM

## 2024-10-21 RX ORDER — METOPROLOL SUCCINATE 100 MG/1
100 TABLET, EXTENDED RELEASE ORAL DAILY
Qty: 90 TABLET | Refills: 0 | Status: SHIPPED | OUTPATIENT
Start: 2024-10-21

## 2024-10-22 RX ORDER — HYDROCHLOROTHIAZIDE 25 MG/1
TABLET ORAL
Qty: 90 TABLET | Refills: 1 | OUTPATIENT
Start: 2024-10-22

## 2024-10-22 RX ORDER — BUPROPION HYDROCHLORIDE 150 MG/1
150 TABLET ORAL EVERY MORNING
Qty: 90 TABLET | Refills: 0 | OUTPATIENT
Start: 2024-10-22

## 2024-10-22 RX ORDER — CITALOPRAM HYDROBROMIDE 40 MG/1
40 TABLET ORAL DAILY
Qty: 90 TABLET | Refills: 0 | OUTPATIENT
Start: 2024-10-22

## 2024-11-05 ENCOUNTER — PATIENT OUTREACH (OUTPATIENT)
Dept: CARE COORDINATION | Facility: CLINIC | Age: 59
End: 2024-11-05

## 2024-11-17 ASSESSMENT — PATIENT HEALTH QUESTIONNAIRE - PHQ9
SUM OF ALL RESPONSES TO PHQ QUESTIONS 1-9: 0
10. IF YOU CHECKED OFF ANY PROBLEMS, HOW DIFFICULT HAVE THESE PROBLEMS MADE IT FOR YOU TO DO YOUR WORK, TAKE CARE OF THINGS AT HOME, OR GET ALONG WITH OTHER PEOPLE: NOT DIFFICULT AT ALL
SUM OF ALL RESPONSES TO PHQ QUESTIONS 1-9: 0

## 2024-11-18 ENCOUNTER — OFFICE VISIT (OUTPATIENT)
Dept: FAMILY MEDICINE | Facility: CLINIC | Age: 59
End: 2024-11-18

## 2024-11-18 VITALS
TEMPERATURE: 98 F | BODY MASS INDEX: 44.1 KG/M2 | WEIGHT: 315 LBS | OXYGEN SATURATION: 99 % | HEART RATE: 62 BPM | DIASTOLIC BLOOD PRESSURE: 84 MMHG | HEIGHT: 71 IN | SYSTOLIC BLOOD PRESSURE: 122 MMHG | RESPIRATION RATE: 14 BRPM

## 2024-11-18 DIAGNOSIS — Z12.11 SCREEN FOR COLON CANCER: ICD-10-CM

## 2024-11-18 DIAGNOSIS — M25.562 ACUTE PAIN OF LEFT KNEE: ICD-10-CM

## 2024-11-18 DIAGNOSIS — Z00.00 ROUTINE MEDICAL EXAM: Primary | ICD-10-CM

## 2024-11-18 DIAGNOSIS — F33.9 EPISODE OF RECURRENT MAJOR DEPRESSIVE DISORDER, UNSPECIFIED DEPRESSION EPISODE SEVERITY (H): ICD-10-CM

## 2024-11-18 DIAGNOSIS — I10 ESSENTIAL HYPERTENSION: ICD-10-CM

## 2024-11-18 LAB — HGB BLD-MCNC: 14.9 G/DL (ref 13.3–17.7)

## 2024-11-18 PROCEDURE — 99214 OFFICE O/P EST MOD 30 MIN: CPT | Mod: 25 | Performed by: NURSE PRACTITIONER

## 2024-11-18 PROCEDURE — G0103 PSA SCREENING: HCPCS | Performed by: NURSE PRACTITIONER

## 2024-11-18 PROCEDURE — 80048 BASIC METABOLIC PNL TOTAL CA: CPT | Performed by: NURSE PRACTITIONER

## 2024-11-18 PROCEDURE — 85018 HEMOGLOBIN: CPT | Performed by: NURSE PRACTITIONER

## 2024-11-18 PROCEDURE — 80061 LIPID PANEL: CPT | Performed by: NURSE PRACTITIONER

## 2024-11-18 PROCEDURE — 36415 COLL VENOUS BLD VENIPUNCTURE: CPT | Performed by: NURSE PRACTITIONER

## 2024-11-18 PROCEDURE — 99396 PREV VISIT EST AGE 40-64: CPT | Performed by: NURSE PRACTITIONER

## 2024-11-18 RX ORDER — BUPROPION HYDROCHLORIDE 150 MG/1
150 TABLET ORAL EVERY MORNING
Qty: 90 TABLET | Refills: 2 | Status: SHIPPED | OUTPATIENT
Start: 2024-11-18

## 2024-11-18 RX ORDER — METOPROLOL SUCCINATE 100 MG/1
100 TABLET, EXTENDED RELEASE ORAL DAILY
Qty: 90 TABLET | Refills: 3 | Status: SHIPPED | OUTPATIENT
Start: 2024-11-18

## 2024-11-18 RX ORDER — HYDROCHLOROTHIAZIDE 25 MG/1
TABLET ORAL
Qty: 90 TABLET | Refills: 3 | Status: SHIPPED | OUTPATIENT
Start: 2024-11-18

## 2024-11-18 RX ORDER — CITALOPRAM HYDROBROMIDE 40 MG/1
40 TABLET ORAL DAILY
Qty: 90 TABLET | Refills: 2 | Status: SHIPPED | OUTPATIENT
Start: 2024-11-18

## 2024-11-18 ASSESSMENT — PAIN SCALES - GENERAL: PAINLEVEL_OUTOF10: NO PAIN (0)

## 2024-11-18 NOTE — PROGRESS NOTES
Assessment & Plan     Episode of recurrent major depressive disorder, unspecified depression episode severity (H)  Mood symptoms addressed. Symptoms controlled/stable.  Continue plan.  Healthy self cares.  Will refill as necessary in between visits.  Routine follow-up is every 6 months.  Counseling, as needed.  Patient advised to follow-up if worsening symptoms.  - citalopram (CELEXA) 40 MG tablet; Take 1 tablet (40 mg) by mouth daily.  - buPROPion (WELLBUTRIN XL) 150 MG 24 hr tablet; Take 1 tablet (150 mg) by mouth every morning.    Essential hypertension  HTN- Controlled, continue plan.  Updating medication refills and labs as necessary.  Continue healthy habits.  - hydrochlorothiazide (HYDRODIURIL) 25 MG tablet; Take 1 tablet by mouth daily in the morning.  - metoprolol succinate ER (TOPROL XL) 100 MG 24 hr tablet; Take 1 tablet (100 mg) by mouth daily.  - Basic metabolic panel; Future  - Basic metabolic panel    Screen for colon cancer    - COLOGUARD([employer]); Future    Routine medical exam  Patient declined vaccines today.  Discussed options and rationale.  Ordered HCM testing per our discussion and patient decision based on USPSTF and Cancer screening guidelines.      Reinforced healthy habits with lifestyle, exercise and nutrition.  - Lipid panel reflex to direct LDL Fasting; Future  - Prostate Specific Antigen Screen; Future  - Hemoglobin; Future  - Lipid panel reflex to direct LDL Fasting  - Prostate Specific Antigen Screen  - Hemoglobin    Acute pain of left knee  Discussed etiologies. Pt. Does not feel clot related,and declines PE eval. Discussed warning signs- pt. Familiar due to prev. Hx  Ultrasound for cost purposes.   Concern for Baker's cyst.   - US [employer]; Future      Weight mgmt as able.   Evisit in 6 months for mood re-check.       MEDICATIONS:  Continue current medications without change    Lalita Mandujano is a 58 year old, presenting for the following health issues:  Diabetes,  Musculoskeletal Problem, and Hypertension        2024     2:13 PM   Additional Questions   Roomed by Radha BURT   Accompanied by None         2024     2:13 PM   Patient Reported Additional Medications   Patient reports taking the following new medications NA     Musculoskeletal Problem    History of Present Illness       Reason for visit:  Annual visit   He is taking medications regularly.           Reports HX of blood clots/DVT , does not feel this is the issue. No LE swelling. Just pain with left knee flexion. No injury. At extension- no pain.   Pain is moderate with end flexion of the left knee  Wants to add on PE. Self-insured.    Hypertension Follow-up  Do you check your blood pressure regularly outside of the clinic? Yes   Are you following a low salt diet? Yes  Are your blood pressures ever more than 140 on the top number (systolic) OR more   than 90 on the bottom number (diastolic), for example 140/90? No    Depression   How are you doing with your depression since your last visit? No change  Are you having other symptoms that might be associated with depression? No  Have you had a significant life event?  No   Are you feeling anxious or having panic attacks?   No  Do you have any concerns with your use of alcohol or other drugs? No  Quit his job due to their unfair labor practices.     Social History     Tobacco Use    Smoking status: Former     Current packs/day: 0.00     Average packs/day: 1 pack/day for 20.0 years (20.0 ttl pk-yrs)     Types: Cigarettes     Start date: 7/15/1986     Quit date: 7/15/2006     Years since quittin.3    Smokeless tobacco: Never    Tobacco comments:     quit 13 years    Vaping Use    Vaping status: Never Used   Substance Use Topics    Alcohol use: Yes     Comment: once every 3 months     Drug use: No     Comment: none          2023    10:04 AM 2024    10:39 AM 2024     3:26 PM   PHQ   PHQ-9 Total Score 0 0 0    Q9: Thoughts of better off  dead/self-harm past 2 weeks Not at all  Not at all Not at all        Patient-reported         6/8/2022     3:06 PM 8/16/2022     3:34 PM 6/1/2024    11:25 AM   MYKE-7 SCORE   Total Score  0 (minimal anxiety) 0 (minimal anxiety)   Total Score 0 0 0         11/17/2024     3:26 PM   Last PHQ-9   1.  Little interest or pleasure in doing things 0    2.  Feeling down, depressed, or hopeless 0    3.  Trouble falling or staying asleep, or sleeping too much 0    4.  Feeling tired or having little energy 0    5.  Poor appetite or overeating 0    6.  Feeling bad about yourself 0    7.  Trouble concentrating 0    8.  Moving slowly or restless 0    Q9: Thoughts of better off dead/self-harm past 2 weeks 0    PHQ-9 Total Score 0        Patient-reported         6/1/2024    11:25 AM   MYKE-7    1. Feeling nervous, anxious, or on edge 0    2. Not being able to stop or control worrying 0    3. Worrying too much about different things 0    4. Trouble relaxing 0    5. Being so restless that it is hard to sit still 0    6. Becoming easily annoyed or irritable 0    7. Feeling afraid, as if something awful might happen 0    MYKE-7 Total Score 0   If you checked any problems, how difficult have they made it for you to do your work, take care of things at home, or get along with other people? Not difficult at all        Patient-reported       Suicide Assessment Five-step Evaluation and Treatment (SAFE-T)        Pain History:  When did you first notice your pain? 2 weeks    Have you seen anyone else for your pain? No  How has your pain affected your ability to work? Pain does not limit ability to work   What type of work do you or did you do? Retired   Where in your body do you have pain? Musculoskeletal problem/pain  Onset/Duration: 2 weeks   Description  Location: leg - left  Joint Swelling: No  Redness: No  Pain: YES  Warmth: No  Intensity:  5/10  Progression of Symptoms:  intermittent  Accompanying signs and symptoms:   Fevers:  "No  Numbness/tingling/weakness: No  History  Trauma to the area: No  Recent illness:  No  Previous similar problem: No  Previous evaluation:  No  Precipitating or alleviating factors:  Aggravating factors include: overuse  Therapies tried and outcome: nothing    No CP, SOB.                 Review of Systems  Constitutional, HEENT, cardiovascular, pulmonary, gi and gu systems are negative, except as otherwise noted.      Objective    /84   Pulse 62   Temp 98  F (36.7  C) (Temporal)   Resp 14   Ht 1.81 m (5' 11.26\")   Wt (!) 172.1 kg (379 lb 8 oz)   SpO2 99%   BMI 52.54 kg/m    Body mass index is 52.54 kg/m .  Physical Exam   GENERAL: alert and no distress  EYES: Eyes grossly normal to inspection, PERRL and conjunctivae and sclerae normal  HENT: ear canals and TM's normal, nose and mouth without ulcers or lesions  NECK: no adenopathy, no asymmetry, masses, or scars  RESP: lungs clear to auscultation - no rales, rhonchi or wheezes  CV: regular rate and rhythm, normal S1 S2, no S3 or S4, no murmur, click or rub, no peripheral edema  ABDOMEN: soft, obese, nontender, without hepatosplenomegaly or masses and bowel sounds normal  MS: no gross deformities. LE calf sizes appear equal. No edema, large habitus.   Left knee- negative modified Apley's, no joint line tenderness, no effusion, negative drawer testing, ligaments intact. + swelling posterior left knee- concern for Baker's cyst.   Negative Homans' sign  SKIN: no suspicious lesions or rashes  NEURO: Normal strength and tone, mentation intact and speech normal  PSYCH: mentation appears normal, affect normal/bright    No results found.        Signed Electronically by: NANDO Ellison CNP    "

## 2024-11-19 LAB
ANION GAP SERPL CALCULATED.3IONS-SCNC: 12 MMOL/L (ref 7–15)
BUN SERPL-MCNC: 14.6 MG/DL (ref 6–20)
CALCIUM SERPL-MCNC: 9.4 MG/DL (ref 8.8–10.4)
CHLORIDE SERPL-SCNC: 99 MMOL/L (ref 98–107)
CHOLEST SERPL-MCNC: 154 MG/DL
CREAT SERPL-MCNC: 0.93 MG/DL (ref 0.67–1.17)
EGFRCR SERPLBLD CKD-EPI 2021: >90 ML/MIN/1.73M2
FASTING STATUS PATIENT QL REPORTED: YES
FASTING STATUS PATIENT QL REPORTED: YES
GLUCOSE SERPL-MCNC: 122 MG/DL (ref 70–99)
HCO3 SERPL-SCNC: 27 MMOL/L (ref 22–29)
HDLC SERPL-MCNC: 44 MG/DL
LDLC SERPL CALC-MCNC: 79 MG/DL
NONHDLC SERPL-MCNC: 110 MG/DL
POTASSIUM SERPL-SCNC: 4.1 MMOL/L (ref 3.4–5.3)
PSA SERPL DL<=0.01 NG/ML-MCNC: 0.23 NG/ML (ref 0–3.5)
SODIUM SERPL-SCNC: 138 MMOL/L (ref 135–145)
TRIGL SERPL-MCNC: 156 MG/DL

## 2024-11-19 NOTE — RESULT ENCOUNTER NOTE
Delbert,  Your labs that have returned are normal. More labs are still processing.     Sincerely,  Lissa Gamez DNP

## 2024-11-21 NOTE — PATIENT INSTRUCTIONS
Preventive Care Advice   This is general advice given by our system to help you stay healthy. However, your care team may have specific advice just for you. Please talk to your care team about your preventive care needs.  Nutrition  Eat 5 or more servings of fruits and vegetables each day.  Try wheat bread, brown rice and whole grain pasta (instead of white bread, rice, and pasta).  Get enough calcium and vitamin D. Check the label on foods and aim for 100% of the RDA (recommended daily allowance).  Lifestyle  Exercise at least 150 minutes each week  (30 minutes a day, 5 days a week).  Do muscle strengthening activities 2 days a week. These help control your weight and prevent disease.  No smoking.  Wear sunscreen to prevent skin cancer.  Have a dental exam and cleaning every 6 months.  Yearly exams  See your health care team every year to talk about:  Any changes in your health.  Any medicines your care team has prescribed.  Preventive care, family planning, and ways to prevent chronic diseases.  Shots (vaccines)   HPV shots (up to age 26), if you've never had them before.  Hepatitis B shots (up to age 59), if you've never had them before.  COVID-19 shot: Get this shot when it's due.  Flu shot: Get a flu shot every year.  Tetanus shot: Get a tetanus shot every 10 years.  Pneumococcal, hepatitis A, and RSV shots: Ask your care team if you need these based on your risk.  Shingles shot (for age 50 and up)  General health tests  Diabetes screening:  Starting at age 35, Get screened for diabetes at least every 3 years.  If you are younger than age 35, ask your care team if you should be screened for diabetes.  Cholesterol test: At age 39, start having a cholesterol test every 5 years, or more often if advised.  Bone density scan (DEXA): At age 50, ask your care team if you should have this scan for osteoporosis (brittle bones).  Hepatitis C: Get tested at least once in your life.  STIs (sexually transmitted  infections)  Before age 24: Ask your care team if you should be screened for STIs.  After age 24: Get screened for STIs if you're at risk. You are at risk for STIs (including HIV) if:  You are sexually active with more than one person.  You don't use condoms every time.  You or a partner was diagnosed with a sexually transmitted infection.  If you are at risk for HIV, ask about PrEP medicine to prevent HIV.  Get tested for HIV at least once in your life, whether you are at risk for HIV or not.  Cancer screening tests  Cervical cancer screening: If you have a cervix, begin getting regular cervical cancer screening tests starting at age 21.  Breast cancer scan (mammogram): If you've ever had breasts, begin having regular mammograms starting at age 40. This is a scan to check for breast cancer.  Colon cancer screening: It is important to start screening for colon cancer at age 45.  Have a colonoscopy test every 10 years (or more often if you're at risk) Or, ask your provider about stool tests like a FIT test every year or Cologuard test every 3 years.  To learn more about your testing options, visit:   .  For help making a decision, visit:   https://bit.ly/lq35875.  Prostate cancer screening test: If you have a prostate, ask your care team if a prostate cancer screening test (PSA) at age 55 is right for you.  Lung cancer screening: If you are a current or former smoker ages 50 to 80, ask your care team if ongoing lung cancer screenings are right for you.  For informational purposes only. Not to replace the advice of your health care provider. Copyright   2023 [facility]. All rights reserved. Clinically reviewed by the [facility] Transitions Program. [employer] 511124 - REV 01/24.     General Instructions:  If you have been seen for a concern and are worse or not improving, please schedule a follow-up or reach out to a member of our care team or nurses if it is urgent.   Nurse advice is available  24/7 by calling [phone].  For emergencies, please call [phone].    You may see your results before we can make recommendations. This is common, as sometimes we are awaiting labs to return or we are out of office on a particular day. Please be patient, and if you don't see a response from me or one of my colleagues within 2-3 business days, and you have a specific concern, please send us a message.     If you have run out of refills, please schedule follow-up visits. This is generally an indication for when you are due for a follow-up visit. Most mental health or chronic issues we are treating require some type of visit every 6 months. We are now offering many issues to be done through telephone or video visits! However, if exams are needed or it is a complex concern, we may ask you to be seen in person.    Physicals/Preventative exam slots fill up fast. Please consider scheduling these 2-3 months in advance to allow for the appointment time that fits you best. If you have medications ordered or other issues addressed at these visits, please be aware there are extra costs associated with this.      Sincerely,   Lissa Gamez [facility]

## 2024-12-03 DIAGNOSIS — M25.562 ACUTE PAIN OF LEFT KNEE: Primary | ICD-10-CM

## 2024-12-04 ENCOUNTER — HOSPITAL ENCOUNTER (OUTPATIENT)
Dept: ULTRASOUND IMAGING | Facility: CLINIC | Age: 59
Discharge: HOME | End: 2024-12-04
Attending: NURSE PRACTITIONER

## 2024-12-04 DIAGNOSIS — M25.562 ACUTE PAIN OF LEFT KNEE: ICD-10-CM

## 2024-12-04 PROCEDURE — 76882 US LMTD JT/FCL EVL NVASC XTR: CPT | Mod: LT

## 2024-12-04 NOTE — RESULT ENCOUNTER NOTE
There is no cyst on the Ultrasound. I'm not sure what is causing your pain. OK to proceed with MRI- I did order it yesterday.   Sincerely,   Lissa Gamez, DNP

## 2024-12-06 ENCOUNTER — APPOINTMENT (OUTPATIENT)
Dept: GENERAL RADIOLOGY | Facility: CLINIC | Age: 59
End: 2024-12-06
Attending: FAMILY MEDICINE

## 2024-12-06 ENCOUNTER — HOSPITAL ENCOUNTER (EMERGENCY)
Facility: CLINIC | Age: 59
Discharge: HOME | End: 2024-12-06
Attending: FAMILY MEDICINE | Admitting: FAMILY MEDICINE

## 2024-12-06 VITALS
OXYGEN SATURATION: 98 % | DIASTOLIC BLOOD PRESSURE: 90 MMHG | HEIGHT: 72 IN | SYSTOLIC BLOOD PRESSURE: 133 MMHG | WEIGHT: 315 LBS | TEMPERATURE: 96.8 F | RESPIRATION RATE: 20 BRPM | HEART RATE: 59 BPM | BODY MASS INDEX: 42.66 KG/M2

## 2024-12-06 DIAGNOSIS — M25.562 ACUTE PAIN OF LEFT KNEE: ICD-10-CM

## 2024-12-06 PROCEDURE — 99284 EMERGENCY DEPT VISIT MOD MDM: CPT | Performed by: FAMILY MEDICINE

## 2024-12-06 PROCEDURE — 96372 THER/PROPH/DIAG INJ SC/IM: CPT | Performed by: FAMILY MEDICINE

## 2024-12-06 PROCEDURE — 73562 X-RAY EXAM OF KNEE 3: CPT | Mod: LT

## 2024-12-06 PROCEDURE — 250N000011 HC RX IP 250 OP 636: Performed by: FAMILY MEDICINE

## 2024-12-06 RX ORDER — KETOROLAC TROMETHAMINE 30 MG/ML
60 INJECTION, SOLUTION INTRAMUSCULAR; INTRAVENOUS ONCE
Status: COMPLETED | OUTPATIENT
Start: 2024-12-06 | End: 2024-12-06

## 2024-12-06 RX ORDER — HYDROCODONE BITARTRATE AND ACETAMINOPHEN 5; 325 MG/1; MG/1
1 TABLET ORAL
Qty: 10 TABLET | Refills: 0 | Status: SHIPPED | OUTPATIENT
Start: 2024-12-06

## 2024-12-06 RX ORDER — NAPROXEN 500 MG/1
500 TABLET ORAL 2 TIMES DAILY WITH MEALS
Qty: 40 TABLET | Refills: 0 | Status: SHIPPED | OUTPATIENT
Start: 2024-12-06 | End: 2024-12-26

## 2024-12-06 RX ADMIN — KETOROLAC TROMETHAMINE 60 MG: 30 INJECTION, SOLUTION INTRAMUSCULAR at 09:22

## 2024-12-06 ASSESSMENT — COLUMBIA-SUICIDE SEVERITY RATING SCALE - C-SSRS
1. IN THE PAST MONTH, HAVE YOU WISHED YOU WERE DEAD OR WISHED YOU COULD GO TO SLEEP AND NOT WAKE UP?: NO
2. HAVE YOU ACTUALLY HAD ANY THOUGHTS OF KILLING YOURSELF IN THE PAST MONTH?: NO
6. HAVE YOU EVER DONE ANYTHING, STARTED TO DO ANYTHING, OR PREPARED TO DO ANYTHING TO END YOUR LIFE?: NO
6. HAVE YOU EVER DONE ANYTHING, STARTED TO DO ANYTHING, OR PREPARED TO DO ANYTHING TO END YOUR LIFE?: NO
2. HAVE YOU ACTUALLY HAD ANY THOUGHTS OF KILLING YOURSELF IN THE PAST MONTH?: NO
1. IN THE PAST MONTH, HAVE YOU WISHED YOU WERE DEAD OR WISHED YOU COULD GO TO SLEEP AND NOT WAKE UP?: NO

## 2024-12-06 ASSESSMENT — ACTIVITIES OF DAILY LIVING (ADL)
ADLS_ACUITY_SCORE: 44
ADLS_ACUITY_SCORE: 44

## 2024-12-06 NOTE — ED TRIAGE NOTES
PT presents with c/o left knee pain, PT reports pain to posterior part of the left knee. PT reports pain that started end of October, yesterday morning while he was putting his pants on he felt something popped. PT states that pain is a 2/10 when he is sitting but shoots up to a 9/10 when he bends his left knee.

## 2024-12-06 NOTE — ED PROVIDER NOTES
History     Chief Complaint   Patient presents with    Knee Pain     HPI  Delbert Dowling is a 59 year old male who presents with knee pain this been going on for the past couple of months.  Patient saw his primary care doctor about this because they were concerned about a possible Baker's cyst.  Ultrasound was ordered of the knee last week which was negative.  Was then bending over last night and felt a sudden pop in his knee and since then has had excruciating pain.  At rest and with his leg straight and its only a 3 out of 10 but once he tries to bend it or stand it goes up to a 9 out of 10.  Patient denies any recent trauma.  Patient is set up for an MRI next week.  He tried some ibuprofen yesterday which did not help.  Denies any extremity numbness or tingling.    Allergies:  Allergies   Allergen Reactions    Eliquis [Apixaban] Hives and Swelling    Lisinopril Cough       Problem List:    Patient Active Problem List    Diagnosis Date Noted    Prediabetes 11/01/2021     Priority: Medium    History of pulmonary embolism 11/01/2021     Priority: Medium    CLAIR (obstructive sleep apnea) 07/25/2019     Priority: Medium     7/23/2019 [facility] Sleep Study (368.0 lbs) - AHI 73.2, RDI 77.9, Supine .8, REM AHI -, Low O2% 75.4%, Time Spent <=88% 22.5, Time Spent <=89% 26.3. Treatment was titrated to a pressure of CPAP 10 with an AHI 5.1. Time spent in REM supine at this pressure was 0 minutes.      S/P ORIF (open reduction internal fixation) fracture 04/03/2019     Priority: Medium     left distal fibula by Dr. Amrit Rivera on 3/22/19      Depression, major 03/28/2019     Priority: Medium    Disorder of nasal sinus 03/28/2019     Priority: Medium     Overview:   Testing showed decongestants caused too much drying and secondary infections, since stopping that med the sinus sx have been less.      Skin tag 03/28/2019     Priority: Medium     Overview:   Neck area      Closed fracture of posterior malleolus of  left tibia with routine healing 2019     Priority: Medium    Closed displaced fracture of lateral malleolus of left fibula, initial encounter 2019     Priority: Medium    Closed fracture of left tibia and fibula, initial encounter 2019     Priority: Medium    Morbid obesity (H) 2018     Priority: Medium    Snoring 2018     Priority: Medium    Episode of recurrent major depressive disorder, unspecified depression episode severity (H) 2018     Priority: Medium    Essential hypertension 2018     Priority: Medium        Past Medical History:    Past Medical History:   Diagnosis Date    Depression     Depressive disorder 2000    History of tobacco abuse     HTN (hypertension)     Morbid obesity (H)     CLAIR (obstructive sleep apnea)     Pulmonary emboli (H)        Past Surgical History:    Past Surgical History:   Procedure Laterality Date    OPEN REDUCTION INTERNAL FIXATION ANKLE Left 3/22/2019    Procedure: OPEN REDUCTION INTERNAL FIXATION LEFT ANKLE;  Surgeon: Amrit Rivera DO;  Location: [facility] OR       Family History:    Family History   Problem Relation Age of Onset    Chronic Obstructive Pulmonary Disease Mother     Hypertension Father        Social History:  Marital Status:  Single [1]  Social History     Tobacco Use    Smoking status: Former     Current packs/day: 0.00     Average packs/day: 1 pack/day for 20.0 years (20.0 ttl pk-yrs)     Types: Cigarettes     Start date: 7/15/1986     Quit date: 7/15/2006     Years since quittin.4    Smokeless tobacco: Never    Tobacco comments:     quit 13 years    Vaping Use    Vaping status: Never Used   Substance Use Topics    Alcohol use: Yes     Comment: once every 3 months     Drug use: No     Comment: none         Medications:    buPROPion (WELLBUTRIN XL) 150 MG 24 hr tablet  citalopram (CELEXA) 40 MG tablet  hydrochlorothiazide (HYDRODIURIL) 25 MG tablet  metoprolol succinate ER (TOPROL XL) 100 MG 24 hr  tablet  Multiple Vitamins-Minerals (MULTIVITAMIN ADULT PO)  order for DME          Review of Systems   All other systems reviewed and are negative.      Physical Exam   BP: 136/89  Pulse: 73  Temp: 96.8  F (36  C)  Resp: 19  Height: 182.9 cm (6')  Weight: (!) 171 kg (377 lb)  SpO2: 98 %      Physical Exam  Vitals and nursing note reviewed.   Constitutional:       General: He is not in acute distress.     Appearance: Normal appearance. He is not ill-appearing.   Musculoskeletal:      Left knee: No swelling, deformity, effusion, erythema, ecchymosis, lacerations, bony tenderness or crepitus. Decreased range of motion. No tenderness. Normal alignment, normal meniscus and normal patellar mobility.   Neurological:      Mental Status: He is alert.         ED Course        Procedures        Results for orders placed or performed during the hospital encounter of 12/06/24 (from the past 24 hours)   XR Knee Left 3 Views    Narrative    XR KNEE LEFT 3 VIEWS   12/6/2024 9:32 AM     HISTORY: left knee pain  COMPARISON: None.       Impression    IMPRESSION: No acute fracture. Mild tricompartmental degenerative  arthritis left knee with small marginal osteophytes and mild medial  compartment narrowing. Trace knee joint joint effusion.    ADAM HIGGINBOTHAM MD         SYSTEM ID:  [id number]       Medications   ketorolac (TORADOL) injection 60 mg (60 mg Intramuscular $Given 12/6/24 0922)     X-ray came back and did show findings consistent with tricompartmental arthritis.  Not sure if this is just with causing the pain or if patient could have a torn meniscus.  Patient next he has an MRI set up for next week.  What I Breann do is prescribe the patient some naproxen to take for the next 10 days, she will also alternate Tylenol through the day.  Patient was given some hydrocodone to use at night to help him sleep.  Patient was told to follow-up with his doctor after the MRI as patient will likely need a referral to  orthopedics.    Assessments & Plan (with Medical Decision Making)  Left knee pain     I have reviewed the nursing notes.    I have reviewed the findings, diagnosis, plan and need for follow up with the patient.        12/6/2024   [facility] EMERGENCY DEPT       Kingsley Leone MD  12/06/24 1017

## 2024-12-06 NOTE — DISCHARGE INSTRUCTIONS
1.   a neoprene sleeve to wear on your knee and wear at all times, you can take off to shower.  Use your walker at home as needed for help with weightbearing.  Use the Naprosyn twice a day during the day and alternate Tylenol with this.  Use the hydrocodone at night to help you sleep if needed.  Talk to your doctor after your MRIs done next week.

## 2024-12-13 ENCOUNTER — HOSPITAL ENCOUNTER (OUTPATIENT)
Dept: MRI IMAGING | Facility: CLINIC | Age: 59
Discharge: HOME | End: 2024-12-13
Attending: NURSE PRACTITIONER | Admitting: NURSE PRACTITIONER

## 2024-12-13 DIAGNOSIS — M25.562 ACUTE PAIN OF LEFT KNEE: ICD-10-CM

## 2024-12-13 PROCEDURE — 73721 MRI JNT OF LWR EXTRE W/O DYE: CPT | Mod: 26 | Performed by: RADIOLOGY

## 2024-12-13 PROCEDURE — 73721 MRI JNT OF LWR EXTRE W/O DYE: CPT | Mod: LT

## 2024-12-17 DIAGNOSIS — S83.232D COMPLEX TEAR OF MEDIAL MENISCUS OF LEFT KNEE, UNSPECIFIED WHETHER OLD OR CURRENT TEAR, SUBSEQUENT ENCOUNTER: Primary | ICD-10-CM

## 2024-12-23 DIAGNOSIS — M25.562 LEFT KNEE PAIN: Primary | ICD-10-CM

## 2025-01-03 ENCOUNTER — ANCILLARY PROCEDURE (OUTPATIENT)
Dept: GENERAL RADIOLOGY | Facility: CLINIC | Age: 60
End: 2025-01-03
Attending: NURSE PRACTITIONER
Payer: COMMERCIAL

## 2025-01-03 DIAGNOSIS — M25.562 LEFT KNEE PAIN: ICD-10-CM

## 2025-01-03 PROCEDURE — 73564 X-RAY EXAM KNEE 4 OR MORE: CPT | Mod: TC | Performed by: RADIOLOGY

## 2025-06-18 ENCOUNTER — NURSE TRIAGE (OUTPATIENT)
Dept: FAMILY MEDICINE | Facility: CLINIC | Age: 60
End: 2025-06-18

## 2025-06-18 NOTE — TELEPHONE ENCOUNTER
Patient scheduled to see Lissa Gamez on 6/19 for headache and shortness of breath. RN, please triage if appropriate.

## 2025-06-18 NOTE — TELEPHONE ENCOUNTER
Nurse Triage SBAR    Is this a 2nd Level Triage? YES, LICENSED PRACTITIONER REVIEW IS REQUIRED    Situation: Patient noted occasional increased mild SOB with higher activity levels. None currently. BP averaging at home 130's/80's. HA noted mild 6/17  and none currently.    Background: HTN    Assessment: Denies wheezing, cough, SOB at rest, heart rate increase/skipping/pounding, swelling, vision changes, numbness/tingling, tinnitus, fever, chills, N&V.   Protocol Recommended Disposition:   Go To Office Now    Declined, no symptoms at this time and will keep appt. 6/19    Appointments in Next Year      Jun 19, 2025 9:00 AM  (Arrive by 8:40 AM)  Provider Visit with NANDO Lorenzo CNP  [facility] ([facility]) [phone]            Recommendation: Reviewed worsening and  red flags need to be seen in ED/911. Patient verbalized understanding and all questions answered.     Does the patient meet one of the following criteria for ADS visit consideration? 16+ years old, with an [facility] PCP   Alta Marley RN  [facility] - Registered Nurse  Clinic Triage [facility]   June 18, 2025      Reason for Disposition   Patient wants to be seen    Additional Information   Negative: SEVERE difficulty breathing (e.g., struggling for each breath, speaks in single words, pulse > 120)   Negative: Breathing stopped and hasn't returned   Negative: Choking on something   Negative: Bluish (or gray) lips or face   Negative: Difficult to awaken or acting confused (e.g., disoriented, slurred speech)   Negative: Passed out (e.g., fainted, lost consciousness, blacked out and was not responding)   Negative: Wheezing started suddenly after medicine, an allergic food, or bee sting   Negative: Stridor (harsh sound while breathing in)   Negative: Slow, shallow and weak breathing   Negative: Sounds like a life-threatening emergency to the triager   Negative: Chest pain   Negative: Wheezing (high pitched  "whistling sound) and previous asthma attacks or use of asthma medicines   Negative: Breathing difficulty and within 14 days of COVID-19 EXPOSURE (close contact) with someone diagnosed with COVID-19 (e.g., COVID test positive)   Negative: Breathing difficulty and COVID-19 is widespread in the community   Negative: Breathing diffculty and only present when coughing   Negative: Breathing difficulty and only from stuffy nose   Negative: Breathing diffculty and only from stuffy nose or runny nose from common cold   Negative: MODERATE difficulty breathing (e.g., speaks in phrases, SOB even at rest, pulse 100-120) of new-onset or worse than normal   Negative: Oxygen level (e.g., pulse oximetry) 90% or lower   Negative: Wheezing can be heard across the room   Negative: Drooling or spitting out saliva (because can't swallow)   Negative: Any history of prior \"blood clot\" in leg or lungs   Negative: Illness requiring prolonged bedrest in past month (e.g., immobilization, long hospital stay)   Negative: Hip or leg fracture (broken bone) in past month (or had cast on leg or ankle in past month)   Negative: Major surgery in the past month   Negative: Long-distance travel in past month (e.g., car, bus, train, plane; with trip lasting 6 or more hours)   Negative: Cancer treatment in past six months (or has cancer now)   Negative: Extra heartbeats, irregular heart beating, or heart is beating very fast (i.e., 'palpitations')   Negative: Fever > 103 F (39.4 C)   Negative: Fever > 101 F (38.3 C) and over 60 years of age   Negative: Fever > 100 F (37.8 C) and bedridden (e.g., nursing home patient, stroke, chronic illness, recovering from surgery)   Negative: Fever > 100 F (37.8 C) and diabetes mellitus or weak immune system (e.g., HIV positive, cancer chemo, splenectomy, organ transplant, chronic steroids)   Negative: Periods where breathing stops and then resumes normally and bedridden (e.g., nursing home patient, CVA)   Negative: " Pregnant or postpartum (from 0 to 6 weeks after delivery)   Negative: Patient sounds very sick or weak to the triager   Negative: Continuous (nonstop) coughing   Negative: Longstanding difficulty breathing and not responding to usual therapy   Negative: MILD difficulty breathing (e.g., minimal/no SOB at rest, SOB with walking, pulse < 100) of new-onset or worse than normal   Negative: Longstanding difficulty breathing (e.g., CHF, COPD, emphysema) and worse than normal    Protocols used: Breathing Difficulty-A-OH

## 2025-06-19 ENCOUNTER — ANCILLARY PROCEDURE (OUTPATIENT)
Dept: CT IMAGING | Facility: CLINIC | Age: 60
End: 2025-06-19
Attending: EMERGENCY MEDICINE

## 2025-06-19 ENCOUNTER — OFFICE VISIT (OUTPATIENT)
Dept: FAMILY MEDICINE | Facility: CLINIC | Age: 60
End: 2025-06-19

## 2025-06-19 ENCOUNTER — RESULTS FOLLOW-UP (OUTPATIENT)
Dept: FAMILY MEDICINE | Facility: CLINIC | Age: 60
End: 2025-06-19

## 2025-06-19 ENCOUNTER — ANCILLARY PROCEDURE (OUTPATIENT)
Dept: GENERAL RADIOLOGY | Facility: CLINIC | Age: 60
End: 2025-06-19
Attending: NURSE PRACTITIONER

## 2025-06-19 ENCOUNTER — OFFICE VISIT (OUTPATIENT)
Dept: PEDIATRICS | Facility: CLINIC | Age: 60
End: 2025-06-19

## 2025-06-19 ENCOUNTER — HOSPITAL ENCOUNTER (EMERGENCY)
Facility: CLINIC | Age: 60
Discharge: HOME | End: 2025-06-19
Attending: EMERGENCY MEDICINE | Admitting: EMERGENCY MEDICINE

## 2025-06-19 VITALS
DIASTOLIC BLOOD PRESSURE: 78 MMHG | OXYGEN SATURATION: 94 % | HEART RATE: 71 BPM | TEMPERATURE: 98.7 F | SYSTOLIC BLOOD PRESSURE: 124 MMHG | RESPIRATION RATE: 15 BRPM

## 2025-06-19 VITALS
SYSTOLIC BLOOD PRESSURE: 136 MMHG | HEIGHT: 71 IN | WEIGHT: 315 LBS | BODY MASS INDEX: 44.1 KG/M2 | HEART RATE: 70 BPM | DIASTOLIC BLOOD PRESSURE: 82 MMHG | TEMPERATURE: 97.8 F | RESPIRATION RATE: 20 BRPM | OXYGEN SATURATION: 95 %

## 2025-06-19 VITALS
SYSTOLIC BLOOD PRESSURE: 130 MMHG | HEART RATE: 69 BPM | DIASTOLIC BLOOD PRESSURE: 82 MMHG | RESPIRATION RATE: 18 BRPM | OXYGEN SATURATION: 97 % | TEMPERATURE: 97.8 F

## 2025-06-19 DIAGNOSIS — R06.02 SOB (SHORTNESS OF BREATH): Primary | ICD-10-CM

## 2025-06-19 DIAGNOSIS — Z86.711 HISTORY OF PULMONARY EMBOLISM: ICD-10-CM

## 2025-06-19 DIAGNOSIS — I26.99 BILATERAL PULMONARY EMBOLISM (H): ICD-10-CM

## 2025-06-19 DIAGNOSIS — R06.02 SOB (SHORTNESS OF BREATH): ICD-10-CM

## 2025-06-19 DIAGNOSIS — R73.09 ELEVATED GLUCOSE: ICD-10-CM

## 2025-06-19 DIAGNOSIS — R06.02 SHORTNESS OF BREATH: Primary | ICD-10-CM

## 2025-06-19 DIAGNOSIS — I26.09 ACUTE PULMONARY EMBOLISM WITH ACUTE COR PULMONALE, UNSPECIFIED PULMONARY EMBOLISM TYPE (H): ICD-10-CM

## 2025-06-19 LAB
ALBUMIN SERPL BCG-MCNC: 4.3 G/DL (ref 3.5–5.2)
ALP SERPL-CCNC: 61 U/L (ref 40–150)
ALT SERPL W P-5'-P-CCNC: 45 U/L (ref 0–70)
ANION GAP SERPL CALCULATED.3IONS-SCNC: 11 MMOL/L (ref 7–15)
AST SERPL W P-5'-P-CCNC: 32 U/L (ref 0–45)
ATRIAL RATE - MUSE: 73 BPM
BASOPHILS # BLD AUTO: 0 10E3/UL (ref 0–0.2)
BASOPHILS NFR BLD AUTO: 1 %
BILIRUB SERPL-MCNC: 0.5 MG/DL
BUN SERPL-MCNC: 15.8 MG/DL (ref 6–20)
CALCIUM SERPL-MCNC: 9.9 MG/DL (ref 8.8–10.4)
CHLORIDE SERPL-SCNC: 101 MMOL/L (ref 98–107)
CREAT SERPL-MCNC: 0.92 MG/DL (ref 0.67–1.17)
CREAT SERPL-MCNC: 0.96 MG/DL (ref 0.67–1.17)
D DIMER PPP FEU-MCNC: 6.06 UG/ML FEU (ref 0–0.5)
DIASTOLIC BLOOD PRESSURE - MUSE: NORMAL MMHG
EGFRCR SERPLBLD CKD-EPI 2021: >90 ML/MIN/1.73M2
EGFRCR SERPLBLD CKD-EPI 2021: >90 ML/MIN/1.73M2
EOSINOPHIL # BLD AUTO: 0.1 10E3/UL (ref 0–0.7)
EOSINOPHIL NFR BLD AUTO: 1 %
ERYTHROCYTE [DISTWIDTH] IN BLOOD BY AUTOMATED COUNT: 12.4 % (ref 10–15)
EST. AVERAGE GLUCOSE BLD GHB EST-MCNC: 148 MG/DL
GLUCOSE SERPL-MCNC: 209 MG/DL (ref 70–99)
HBA1C MFR BLD: 6.8 % (ref 0–5.6)
HCO3 SERPL-SCNC: 27 MMOL/L (ref 22–29)
HCT VFR BLD AUTO: 42.2 % (ref 40–53)
HGB BLD-MCNC: 15.1 G/DL (ref 13.3–17.7)
HOLD SPECIMEN: NORMAL
IMM GRANULOCYTES # BLD: 0 10E3/UL
IMM GRANULOCYTES NFR BLD: 0 %
INTERPRETATION ECG - MUSE: NORMAL
LYMPHOCYTES # BLD AUTO: 1.4 10E3/UL (ref 0.8–5.3)
LYMPHOCYTES NFR BLD AUTO: 15 %
MAGNESIUM SERPL-MCNC: 1.8 MG/DL (ref 1.7–2.3)
MCH RBC QN AUTO: 32.1 PG (ref 26.5–33)
MCHC RBC AUTO-ENTMCNC: 35.8 G/DL (ref 31.5–36.5)
MCV RBC AUTO: 90 FL (ref 78–100)
MONOCYTES # BLD AUTO: 0.6 10E3/UL (ref 0–1.3)
MONOCYTES NFR BLD AUTO: 7 %
NEUTROPHILS # BLD AUTO: 6.7 10E3/UL (ref 1.6–8.3)
NEUTROPHILS NFR BLD AUTO: 77 %
NT-PROBNP SERPL-MCNC: 601 PG/ML (ref 0–177)
P AXIS - MUSE: 57 DEGREES
PLATELET # BLD AUTO: 167 10E3/UL (ref 150–450)
POTASSIUM SERPL-SCNC: 4.4 MMOL/L (ref 3.4–5.3)
PR INTERVAL - MUSE: 176 MS
PROT SERPL-MCNC: 7.4 G/DL (ref 6.4–8.3)
QRS DURATION - MUSE: 102 MS
QT - MUSE: 462 MS
QTC - MUSE: 508 MS
R AXIS - MUSE: 19 DEGREES
RBC # BLD AUTO: 4.7 10E6/UL (ref 4.4–5.9)
SODIUM SERPL-SCNC: 139 MMOL/L (ref 135–145)
SYSTOLIC BLOOD PRESSURE - MUSE: NORMAL MMHG
T AXIS - MUSE: 60 DEGREES
TROPONIN T SERPL HS-MCNC: 16 NG/L
TROPONIN T SERPL HS-MCNC: 17 NG/L
VENTRICULAR RATE- MUSE: 73 BPM
WBC # BLD AUTO: 8.8 10E3/UL (ref 4–11)

## 2025-06-19 PROCEDURE — 93005 ELECTROCARDIOGRAM TRACING: CPT

## 2025-06-19 PROCEDURE — 250N000013 HC RX MED GY IP 250 OP 250 PS 637: Performed by: EMERGENCY MEDICINE

## 2025-06-19 PROCEDURE — 84484 ASSAY OF TROPONIN QUANT: CPT | Performed by: EMERGENCY MEDICINE

## 2025-06-19 PROCEDURE — 36415 COLL VENOUS BLD VENIPUNCTURE: CPT | Performed by: EMERGENCY MEDICINE

## 2025-06-19 PROCEDURE — 83880 ASSAY OF NATRIURETIC PEPTIDE: CPT | Performed by: EMERGENCY MEDICINE

## 2025-06-19 PROCEDURE — 71275 CT ANGIOGRAPHY CHEST: CPT | Performed by: RADIOLOGY

## 2025-06-19 PROCEDURE — 99284 EMERGENCY DEPT VISIT MOD MDM: CPT

## 2025-06-19 PROCEDURE — 83735 ASSAY OF MAGNESIUM: CPT | Performed by: EMERGENCY MEDICINE

## 2025-06-19 RX ORDER — IOPAMIDOL 755 MG/ML
90 INJECTION, SOLUTION INTRAVASCULAR ONCE
Status: COMPLETED | OUTPATIENT
Start: 2025-06-19 | End: 2025-06-19

## 2025-06-19 RX ADMIN — RIVAROXABAN 15 MG: 15 TABLET, FILM COATED ORAL at 19:28

## 2025-06-19 RX ADMIN — IOPAMIDOL 90 ML: 755 INJECTION, SOLUTION INTRAVASCULAR at 13:04

## 2025-06-19 ASSESSMENT — ACTIVITIES OF DAILY LIVING (ADL)
ADLS_ACUITY_SCORE: 44

## 2025-06-19 ASSESSMENT — PAIN SCALES - GENERAL: PAINLEVEL_OUTOF10: NO PAIN (0)

## 2025-06-19 NOTE — PROGRESS NOTES
Acute and Diagnostic Services Clinic Visit    Assessment & Plan     Shortness of breath  Episode of sob 2 days ago but feeling back to normal.  Ecg in clinic today showed:   Sinus Rhythm   -Nonspecific QRS widening.  -Negative precordial T-waves -May be normal -consider anteroseptal ischemia.    D dimer today was elevated at 6.06 so referred for CT scan.   - Troponin T, High Sensitivity  - Creatinine  - CT Chest Pulmonary Embolism w Contrast    Today's work up:  troponin normal at 17.   Cr 0.96  Chest CT pe protocol.     The radiologist called me to discuss his Chest CT PE results: Bilateral second through fifth order pulmonary emboli with right-sided heart strain     He was directed to an emergency room for admission.  Initially he did not want to go.  Then he was willing to go to [place] but they declined due to heart strain on imaging. He then agreed to go to [facility] ER.  The ER was called so that they were aware of his coming.  His wife drove him there.      55 minutes spent by me on the date of the encounter doing chart review, review of test results, interpretation of tests, patient visit, documentation, and discussion with other provider(s)       Lalita Mandujano is a 59 year old, presenting for the following health issues:  No chief complaint on file.    The patient is a 58 yo male who is referred to the ADS to evaluate for PE via CT scan.  He has hx of PE a few years ago after surgery.  This past Tuesday he went to his car and couldn't catch his breath.  His gf noticed him and was concerned prompting medical evaluation.  Since that episode he has hasn't felt sob. He denies chest pain, left swelling or pain. He saw his provider today who did blood tests and he had an elevated d dimer so referred him here for imaging.  He has hx of smoking but hasn't smoked for about 20 years. He is not on blood thinners.             Shortness of Breath/Breathing Problem  Onset/Duration: Tuesday started  Progression of  symptoms: improving and intermittent  Accompanying signs and symptoms:       SOB at rest: No       SOB with activity: YES- every activity if too long and too quickly       Pain with inspiration: No       Cough: No       Pink tinged sputum: No       Sweating: No       Nausea/vomiting: No       Lightheadedness: No       Palpitations: No       Fever/chills: No       Heartburn: No  History   Family history of coagulation disorders: YES- Dad had blood clots -  30 years ago happened  Tobacco use: YES- Former  Previous similar symptoms: YES- Clots in lungs after ankle surgery; feels similar  Precipitating factors:  Related to eating: No  Better with burping: No  Therapies tried and outcome: Nothing          Objective    There were no vitals taken for this visit.  There is no height or weight on file to calculate BMI.  Physical Exam  Vitals and nursing note reviewed.   Constitutional:       General: He is not in acute distress.     Appearance: Normal appearance. He is obese. He is not ill-appearing, toxic-appearing or diaphoretic.   HENT:      Head: Normocephalic and atraumatic.      Right Ear: External ear normal.      Left Ear: External ear normal.      Nose: Nose normal.      Mouth/Throat:      Mouth: Mucous membranes are moist.   Eyes:      General: No scleral icterus.     Extraocular Movements: Extraocular movements intact.   Cardiovascular:      Rate and Rhythm: Normal rate and regular rhythm.      Heart sounds: Normal heart sounds.      Friction rub: speaks in full sentences.   Pulmonary:      Effort: Pulmonary effort is normal.      Breath sounds: Normal breath sounds.   Abdominal:      Palpations: Abdomen is soft.      Tenderness: There is no abdominal tenderness.   Musculoskeletal:         General: No swelling, tenderness, deformity or signs of injury. Normal range of motion.      Cervical back: Normal range of motion.      Right lower leg: No edema.      Left lower leg: No edema.   Skin:     General: Skin is warm  and dry.   Neurological:      General: No focal deficit present.      Mental Status: He is alert and oriented to person, place, and time.   Psychiatric:         Mood and Affect: Mood normal.            Results for orders placed or performed in visit on 06/19/25 (from the past 24 hours)   Troponin T, High Sensitivity   Result Value Ref Range    Troponin T, High Sensitivity 17 <=22 ng/L   Creatinine   Result Value Ref Range    Creatinine 0.96 0.67 - 1.17 mg/dL    GFR Estimate >90 >60 mL/min/1.73m2   CT Chest Pulmonary Embolism w Contrast    Narrative    EXAM: CT CHEST PULMONARY EMBOLISM W CONTRAST  LOCATION: [facility]  DATE: 6/19/2025    INDICATION: Shortness of breath.  Elevated D-dimer. History of PE.  COMPARISON: 3/28/2019  TECHNIQUE: CT chest pulmonary angiogram during arterial phase injection of IV contrast. Multiplanar reformats and MIP reconstructions were performed. Dose reduction techniques were used.   CONTRAST: Isovue 370 90cc    FINDINGS:  ANGIOGRAM CHEST: Bilateral pulmonary emboli involving the second through fifth order pulmonary arteries. Dilated right ventricle consistent with pulmonary heart strain. Thoracic aorta is negative for dissection. No CT evidence of right heart strain.    LUNGS AND PLEURA: Bilateral dependent atelectasis. No pneumothorax or pleural effusion. No focal consolidation.    MEDIASTINUM/AXILLAE: Borderline enlarged heart.    CORONARY ARTERY CALCIFICATION: None.    UPPER ABDOMEN: Fatty liver.    MUSCULOSKELETAL: Mild to moderate multilevel discogenic degenerative change.      Impression    IMPRESSION:  1.  Bilateral second through fifth order pulmonary emboli with right-sided heart strain.  2.  Fatty liver.    Findings were communicated to Dr. Jarrett at 1:39 PM on 6/19/2025.           Signed Electronically by: Candice Jarrett MD

## 2025-06-19 NOTE — ED PROVIDER NOTES
Emergency Department Note      History of Present Illness     Chief Complaint   other (Pulmonary Embolism with Right Heart strain)      HPI   Delbert Dowling is a 59 year old male with a history of obesity, prediabetes, hypertension, and PEs who presents to the ED for evaluation of a PE with right heart strain. Delbert reports having shortness of breath for two days.  He states he had a PE in the past, which developed the day after he'd gotten surgery. He mentions he drove to [place] recently, but he stopped and walked every few hours. He denies any lightheadedness, swelling in legs, pain in calves, or hemoptysis. He denies being on blood thinners since 2020. He also denies any recent surgeries, injuries, or hospitalizations.     Independent Historian   None    Review of External Notes   I reviewed outpatient laboratory studies and CT scan from earlier today as seen below:    EXAM: CT CHEST PULMONARY EMBOLISM W CONTRAST  LOCATION: [facility]  DATE: 6/19/2025     INDICATION: Shortness of breath.  Elevated D-dimer. History of PE.  COMPARISON: 3/28/2019  TECHNIQUE: CT chest pulmonary angiogram during arterial phase injection of IV contrast. Multiplanar reformats and MIP reconstructions were performed. Dose reduction techniques were used.   CONTRAST: Isovue 370 90cc     FINDINGS:  ANGIOGRAM CHEST: Bilateral pulmonary emboli involving the second through fifth order pulmonary arteries. Dilated right ventricle consistent with pulmonary heart strain. Thoracic aorta is negative for dissection. No CT evidence of right heart strain.     LUNGS AND PLEURA: Bilateral dependent atelectasis. No pneumothorax or pleural effusion. No focal consolidation.     MEDIASTINUM/AXILLAE: Borderline enlarged heart.     CORONARY ARTERY CALCIFICATION: None.     UPPER ABDOMEN: Fatty liver.     MUSCULOSKELETAL: Mild to moderate multilevel discogenic degenerative change.                                                                       IMPRESSION:  1.  Bilateral second through fifth order pulmonary emboli with right-sided heart strain.  2.  Fatty liver.     Findings were communicated to Dr. Jarrett at 1:39 PM on 6/19/2025.     D-dimer 6.06  CBC with white count of 8.8 hemoglobin 15.1 platelets 167  Troponin 17  Creatinine 0.96      Past Medical History     Medical History and Problem List   Morbid obesity  Hypertension  Major depressive disorder  Prediabetes  Pulmonary embolism  Sleep apnea    Medications   Wellbutrin  Celexa  Hydrodiuril  Metoprolol succinate    Surgical History   Open reduction internal fixation ankle  Removal of skin tags    Physical Exam     Patient Vitals for the past 24 hrs:   BP Temp Temp src Pulse Resp SpO2   06/19/25 1900 -- -- -- -- -- 94 %   06/19/25 1800 124/78 -- -- 71 -- --   06/19/25 1730 137/87 -- -- 70 15 96 %   06/19/25 1700 (!) 136/91 98.7  F (37.1  C) Oral 76 (!) 31 95 %     Physical Exam  General: Well-nourished,  appears to be resting comfortably when I enter the room  Eyes: PERRL, conjunctivae pink no scleral icterus or conjunctival injection  ENT:  Moist mucus membranes, posterior oropharynx clear without erythema or exudates  Respiratory:  Lungs clear to auscultation bilaterally, no crackles/rubs/wheezes.  Good air movement  CV: Normal rate and rhythm, no murmurs/rubs/gallops  GI:  Abdomen soft and protuberant.  Normoactive BS.  No tenderness, guarding or rebound  Skin: Warm, dry.  No rashes or petechiae  Musculoskeletal: No peripheral edema or calf tenderness.  Symmetric girth  Neuro: Alert and oriented to person/place/time  Psychiatric: Normal affect      Diagnostics     Lab Results   Labs Ordered and Resulted from Time of ED Arrival to Time of ED Departure   NT-PROBNP - Abnormal       Result Value    NT-proBNP 601 (*)    TROPONIN T, HIGH SENSITIVITY - Normal    Troponin T, High Sensitivity 16     MAGNESIUM - Normal    Magnesium 1.8       Imaging   No orders to display     EKG   ECG results  from 06/19/25   EKG 12-lead complete w/read - Clinics     Value    Systolic Blood Pressure     Diastolic Blood Pressure     Ventricular Rate 73    Atrial Rate 73    MS Interval 176    QRS Duration 102        QTc 508    P Axis 57    R AXIS 19    T Axis 60    Interpretation ECG      Sinus rhythm  Prolonged QT  Abnormal ECG  No previous ECGs available  Confirmed by GENERATED REPORT, COMPUTER ([id number]),  Rabia Armendariz ([id number]) on 6/19/2025 5:14:36 PM         Independent Interpretation   None    ED Course      Medications Administered   Medications   rivaroxaban ANTICOAGULANT (XARELTO) tablet 15 mg (15 mg Oral $Given 6/19/25 1928)       Procedures   Procedures     Discussion of Management   Interventional radiology Dr. Crabtree    ED Course   ED Course as of 06/19/25 1950   u Jun 19, 2025 1701 I obtained history and examined the patient as noted above.    1900 I consulted with interventional radiology Dr. Arzate.  He had reviewed the CT scan and did not feel the patient had high enough clot burden or presentation significant enough to be a candidate for thrombectomy.  Recommended that the patient could be discharged home on a DOAC or potentially admitted overnight on heparin for monitoring but felt unlikely that he would qualify in the morning.   1912 Patient ambulated with pulse oximetry.  Never dropped below 90%.  Nursing reports that for about 10 seconds while he was sitting on the bed he was 88 and 89% but quickly came back up.  Did not feel significantly short of breath or lightheaded.   1916 I rechecked the patient.  He is not feeling short of breath.  Pulse oximetry continues to be good.  He will be able to get a pulse oximeter for home and did agree to return if it became persistently low below 88% or if he had any other new or worsening symptoms.     Additional Documentation  None    Medical Decision Making / Diagnosis     CMS Diagnoses: None     MIPS   None             MDM     PESI Score for  estimating PE Associated 30-Day Mortality (calculator)  Background  Estimates the 30-day mortality risk associated with pulmonary embolism based on 11 criteria including age, gender, cancer history, CHF, COPD, heart rate >110, SBP <100, RR >30, Tm <96.8 F, O2 Sat <90, and altered mental status.   Data  59 year old  has Episode of recurrent major depressive disorder, unspecified depression episode severity; Essential hypertension; Morbid obesity (H); Snoring; Closed fracture of left tibia and fibula, initial encounter; Closed fracture of posterior malleolus of left tibia with routine healing; Closed displaced fracture of lateral malleolus of left fibula, initial encounter; Depression, major; Disorder of nasal sinus; Skin tag; S/P ORIF (open reduction internal fixation) fracture; CLAIR (obstructive sleep apnea); Prediabetes; and History of pulmonary embolism on their problem list.  Temp: 98.7  F (37.1  C)  BP: 124/78  Pulse: 71  Resp: 15  SpO2: 94 %  Criteria  Score 1/y: Age  Score 10: Male gender  Interpretation  PESI Score: 68  Score <86: Class 2 - Low 30-day mortality risk (1.7 to 3.5%)      Delbert Dowling is a 59 year old male referred to the emergency department for concern of pulmonary emboli.  This appears to be unprovoked.  He does have a history of provoked PE.  On examination he is not hypoxic at rest.  With ambulation his pulse oximetry hovered around 90% with about a 10-second drop to 88% with quick recovery.  CT scan was concerning for right heart strain but his troponin is negative and no significant signs of congestive heart failure.  I did consult with interventional radiology and they did not believe he was a candidate for thrombectomy.  Paci score is actually on the lower side and in discussion with the patient he is comfortable with the plan for discharge home on a DOAC.  He was given his first dose here.  He is to follow-up with his primary care doctor in the next few days.  He is going to get a pulse  oximeter and if his pulse oximetry drops he will return to the hospital.  He will return if he has any new or worsening symptoms.  At this time, with reasonable clinical confidence, I do believe he safe for discharge home.    Disposition   The patient was discharged.     Diagnosis     ICD-10-CM    1. Bilateral pulmonary embolism (H)  I26.99            Discharge Medications   Discharge Medication List as of 6/19/2025  7:25 PM        START taking these medications    Details   Rivaroxaban ANTICOAGULANT 15 & 20 MG TBPK Starter Therapy Pack Take 15 mg by mouth 2 times daily (with meals) for 21 days, THEN 20 mg daily with food for 9 days. Continue as directed by provider., Disp-51 each, R-0, Local Print               Scribe Disclosure:  I, Aure Acevedo, am serving as a scribe at 6:04 PM on 6/19/2025 to document services personally performed by Sherin Medina MD based on my observations and the provider's statements to me.     Scribe Disclosure:  I, Makayla Treviño, am serving as a scribe  at 6:43 PM on 6/19/2025 to document services personally performed by Sherin Medina MD based on my observations and the provider's statements to me.       Sherin Medina MD  06/19/25 1953

## 2025-06-19 NOTE — ED NOTES
Pt present from clinic after having labs and Chest CT done. Results show bilateral PE with right heart strain.

## 2025-06-19 NOTE — DISCHARGE INSTRUCTIONS

## 2025-06-19 NOTE — PATIENT INSTRUCTIONS
Go directly to [facility] emergency room for pulmonary embolism with heart strain on imaging today.     The ER has been called and is aware you are coming.    OCCUPATIONAL THERAPY TREATMENT NOTE    Patient Name:  Adalberto Franklin Jr.   Patient MRN: [id number]  Date: 1/10/2025  Time Calculation  Start Time: 0802  Stop Time: 0845  Time Calculation (min): 43 min    Insurance:  Visit number: 13  Insurance Type: Payor: ANTHHANSEL / Plan: [employer] [place] / Product Type: *No Product type* /   Authorization or Plan of Care date Range: med necessity   Copay: n/a  Referred by: Jose R Rolle MD        OT Therapeutic Procedures Time Entry  Manual Therapy Time Entry: 13  Therapeutic Exercise Time Entry: 5  OT Modalities Time Entry  Ultrasound Time Entry: 10  Whirlpool Time Entry: 15                  General:  Reason for visit: left thumb CMC arthroplasty  Referred by: Dr. Rolle    Type of surgery: Left First Carpometacarpal Arthroplasty With C-arm - Left  Date of surgery: 10/15/2024  Days since surgery: 87    Current Problem:         Problem List Items Addressed This Visit             ICD-10-CM    Primary osteoarthritis of first carpometacarpal joint of left hand M18.12    Decreased  strength of left hand - Primary R29.898     Assessment:  Progress towards functional goals: Improved mobility in thumb MCPJ and Improved performance in daily activities   Response to interventions:  Mild swelling noted to posterior 1st metacarpal and MCPJ of digit II. Tenderness with STM and retrograde massage. Updated HEP to include thumb isometrics and yellow foam squeezes to lightly strengthen L thumb d/t poor tolerance to previous strengthening attempts. HEP handout and yellow foam provided. Patient reports full understanding of new exercises.    Justification for continued skilled care: To address remaining functional, objective and subjective deficits to allow the patient to return to full independence with ADLs. Skilled intervention required to improve ROM which will improve function. Modify and progress home exercise program. Reduce pain to improve function.    Plan:  Progress exercises as  "tolerated to improve overall UE strength and performance in daily activities , Therapeutic exercises to strengthen L thumb  for functional use in daily activities. , Continued education of techniques such as heat to decrease joint stiffness and muscle tightness., Exercises to gradually increase range of motion., Manual therapy to  muscle tightness and improve joint mobility. , and Modalities as needed to address symptoms.    Subjective:  Adalberto reports he feels like his condition is the same.  Progress towards functional goal:   Patient reports he was sore at night after his last session. Notes his ROM HEP exercises do not increase his thumb pain. The strengthening exercises increase the thumb pain to a 8/10. Patient attempted the theraputty activities and quickly discontinued them d/t pain.   Pain Location L thumb base  Pain (0-10): 2    HEP adherence / understanding: compliance with the instructed home exercises.    Precautions:  Fall Risk: None    Precautions listed: Thumb CMC arthroplasty protocol    Therapy diagnoses:   1. Decreased  strength of left hand        2. Primary osteoarthritis of first carpometacarpal joint of left hand  Follow Up In Occupational Therapy          Objective: Measured on 24    Treatment Performed: (\"NP\" = Not Performed)     Therapeutic Exercise: 5 min  - Demonstrated thumb isometric exercises and yellow foam squeezes; provided HEP handout and yellow foam    Therapeutic Activities: NP    Manual Therapy: 13 min  - STM, trigger point therapy, and retrograde massage to L thumb and surgical scar area.   - Light traction and stretching to the L thumb     Neuromuscular Re-Education: NP  -   Modalities: 25 min  - Fluidotherapy with AROM left hand- 15 mins  - US to first dorsal compartment, webspace 3Mhz, 1.0w/cm2, continuous 10 min      Education: Added thumb isometrics exercise(s) to HEP program Access Code [email or web address]    "

## 2025-06-19 NOTE — PROGRESS NOTES
"  Assessment & Plan     ASSESSMENT/ORDERS:    ICD-10-CM    1. SOB (shortness of breath)  R06.02 D dimer, quantitative     XR Chest 2 Views     EKG 12-lead complete w/read - Clinics     Comprehensive metabolic panel     CBC with Platelets & Differential     D dimer, quantitative     Comprehensive metabolic panel     CBC with Platelets & Differential     NM Lexiscan stress test      2. History of pulmonary embolism  Z86.711 D dimer, quantitative     XR Chest 2 Views     D dimer, quantitative          Assessment & Plan  SOB (dyspnea):  - SOB occurs primarily during exertion, with no associated pain, fever, or heart palpitations. Oxygenation is normal at 95%. No leg pain or swelling indicative of blood clots.  - Perform a chest X-ray, D-dimer test, and resting EKG. If results are negative, schedule a stress test. Advise calling 911 if symptoms last more than 5 minutes at rest.  In clinic testing is normal proceed to LExiscan.   Discussed test and risks.       History of pulmonary embolism:  - Previous PE identified via chest X-ray, with a history of blood clots on paternal side.  - Monitor for symptoms and perform diagnostic tests as outlined for SOB.   Stable vitals, no current symptoms. OK for outpatient eval.   Warning signs discussed.     The longitudinal plan of care for the diagnosis(es)/condition(s) as documented were addressed during this visit. Due to the added complexity in care, I will continue to support Delbert in the subsequent management and with ongoing continuity of care.             BMI  Estimated body mass index is 51.82 kg/m  as calculated from the following:    Height as of this encounter: 1.798 m (5' 10.79\").    Weight as of this encounter: 167.5 kg (369 lb 4.8 oz).   Weight management plan: Discussed healthy diet and exercise guidelines          Subjective   Delbert is a 59 year old, presenting for the following health issues:  Shortness of Breath        6/19/2025     8:39 AM   Additional Questions " "  Roomed by Jessica ROSS   Accompanied by N/A         6/19/2025     8:39 AM   Patient Reported Additional Medications   Patient reports taking the following new medications N/A     History of Present Illness       Reason for visit:  Shortness of breath   He is taking medications regularly.          Concern - Shortness of Breath  Onset: Two Days ago  Description: Shortness of Breath accompanied by a Headache later in the day. Reminds him of when he had surgery and had PEs  Intensity: mild  Progression of Symptoms:  waxing and waning  Accompanying Signs & Symptoms: Headache 2 days ago, no longer has any other symptoms  Previous history of similar problem: Yes, similar feeling with PE following ankle surger  Precipitating factors:        Worsened by: Activity  Alleviating factors:        Improved by: Rest  Therapies tried and outcome: None    History of Present Illness-  Delbert Dowling, 59 years    Pulmonary Embolism (PE) Concerns  - History of blood clots on paternal side  - Previous episode of PE identified via chest CT after collapsing at home-post-op in 2019.  - Current symptoms include difficulty breathing, especially during exertion  - No pain, squeezing sensation, or heart palpitations reported  - No fever, leg pain, or swelling  - Symptoms most pronounced on June 17, 2025  - No previous stress test conducted for the heart    -Denies exertional symptoms causing reflux, nausea, weakness no diaphoresis or palpitations reported during episodes.      Social History and Life Stressors  - Not currently working, semi-retired.         Review of Systems  Constitutional, neuro, ENT, endocrine, pulmonary, cardiac, gastrointestinal, genitourinary, musculoskeletal, integument and psychiatric systems are negative, except as otherwise noted.      Objective    /82 (Cuff Size: Adult Large)   Pulse 70   Temp 97.8  F (36.6  C) (Temporal)   Resp 20   Ht 1.798 m (5' 10.79\")   Wt (!) 167.5 kg (369 lb 4.8 oz)   SpO2 95%   BMI " 51.82 kg/m    Body mass index is 51.82 kg/m .  Physical Exam   GENERAL: alert and no distress  EYES: Eyes grossly normal to inspection, PERRL and conjunctivae and sclerae normal  HENT: ear canals and TM's normal, nose and mouth without ulcers or lesions  NECK: no adenopathy, no asymmetry, masses, or scars  RESP: lungs clear to auscultation - no rales, rhonchi or wheezes  CV: regular rate and rhythm, normal S1 S2, no S3 or S4, no murmur, click or rub, no peripheral edema  MS: no gross musculoskeletal defects noted, no edema  SKIN: no suspicious lesions or rashes  NEURO: Normal strength and tone, mentation intact and speech normal  PSYCH: mentation appears normal, affect normal/bright    CXR - Reviewed and interpreted by me borderline cardiomegaly, no acute infiltrates/masses appreciated.   EKG - Reviewed and interpreted by me appears normal, NSR, normal axis, normal intervals, no acute ST/T changes c/w ischemia, no LVH by voltage criteria, no S1Q3T3  Unchanged form prev.         Signed Electronically by: NANDO Ellison CNP

## 2025-06-20 NOTE — DISCHARGE INSTRUCTIONS
*You may resume diet and activities.  *Xarelto as directed.  *Follow-up with your doctor for recheck in the next 2 to 3 days.  Recommend obtaining home pulse oximeter  *Return if you develop pulse oximetry less than 88% persistently, significant shortness of breath, significant pain, faint or feel like you will faint or become worse in any way.

## 2025-06-23 ENCOUNTER — OFFICE VISIT (OUTPATIENT)
Dept: FAMILY MEDICINE | Facility: CLINIC | Age: 60
End: 2025-06-23

## 2025-06-23 VITALS
WEIGHT: 315 LBS | TEMPERATURE: 97.8 F | RESPIRATION RATE: 18 BRPM | OXYGEN SATURATION: 98 % | SYSTOLIC BLOOD PRESSURE: 126 MMHG | HEIGHT: 71 IN | DIASTOLIC BLOOD PRESSURE: 84 MMHG | HEART RATE: 61 BPM | BODY MASS INDEX: 44.1 KG/M2

## 2025-06-23 DIAGNOSIS — E11.59 TYPE 2 DIABETES MELLITUS WITH OTHER CIRCULATORY COMPLICATION, WITHOUT LONG-TERM CURRENT USE OF INSULIN (H): ICD-10-CM

## 2025-06-23 DIAGNOSIS — I10 ESSENTIAL HYPERTENSION: ICD-10-CM

## 2025-06-23 DIAGNOSIS — I26.94 MULTIPLE SUBSEGMENTAL PULMONARY EMBOLI WITHOUT ACUTE COR PULMONALE (H): Primary | ICD-10-CM

## 2025-06-23 DIAGNOSIS — Z12.11 COLON CANCER SCREENING: ICD-10-CM

## 2025-06-23 PROCEDURE — G2211 COMPLEX E/M VISIT ADD ON: HCPCS | Performed by: NURSE PRACTITIONER

## 2025-06-23 PROCEDURE — 99214 OFFICE O/P EST MOD 30 MIN: CPT | Performed by: NURSE PRACTITIONER

## 2025-06-23 PROCEDURE — 99207 PR FOOT EXAM NO CHARGE: CPT | Performed by: NURSE PRACTITIONER

## 2025-06-23 RX ORDER — ATORVASTATIN CALCIUM 20 MG/1
20 TABLET, FILM COATED ORAL DAILY
Qty: 90 TABLET | Refills: 0 | Status: SHIPPED | OUTPATIENT
Start: 2025-06-23

## 2025-06-23 RX ORDER — LOSARTAN POTASSIUM 25 MG/1
25 TABLET ORAL DAILY
Qty: 90 TABLET | Refills: 0 | Status: SHIPPED | OUTPATIENT
Start: 2025-06-23

## 2025-06-23 ASSESSMENT — PAIN SCALES - GENERAL: PAINLEVEL_OUTOF10: NO PAIN (0)

## 2025-06-23 NOTE — PROGRESS NOTES
ASSESSMENT/ORDERS:    ICD-10-CM    1. Multiple subsegmental pulmonary emboli without acute cor pulmonale (H)  I26.94 Anticoagulation Clinic Referral      2. Type 2 diabetes mellitus with other circulatory complication, without long-term current use of insulin (H)  E11.59 atorvastatin (LIPITOR) 20 MG tablet     losartan (COZAAR) 25 MG tablet      3. Essential hypertension  I10 losartan (COZAAR) 25 MG tablet      4. 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 Clots are present in both sides, but no acute cor pulmonale.  - Continue Xarelto for a month, then switch to warfarin-due to cost as patient is self-pay.. INR blood test around July 10th. Consider seeing a blood specialist.  However with 2 episodes of pulmonary embolism, lifelong anticoagulation is warranted.  Patient is understanding this.  He is deferring hematology evaluation at this point.    Type 2 diabetes mellitus with other circulatory complication, without long-term current use of insulin:  - Diabetes diagnosis confirmed, currently not on any treatments.  - Consider weight loss medication to treat both diabetes and weight issues. Freestyle Leroy system recommended for monitoring blood sugar levels-patient would like to self-monitor without blood sugar testing due to cost.  As he is recently transition from prediabetes to diabetes with gradual onset, I advised recommending overall health care goal changes and patient is also agreeable to deferring diabetic education at this time due to cost.  Discussed treatment options including metformin, GLP-1 inhibitor and nutrition and exercise treatments.    Due to current cost patient would like to do his own nutrition and exercise monitoring for the next 6 months.  He will consider GLP-1 injection program through the [employer]  company if necessary.  Starting statin medication for type 2 diabetes and secondary prevention as well.  Emphasize nutrition and exercise, including starting a walking program by Friday.    Essential hypertension:  - Hypertension management discussed.  - Switch from hydrochlorothiazide to losartan for kidney protection. Monitor blood pressure with the switch.    Colon cancer screening:  - Rectal bleeding noted, but bright red blood is less likely to indicate colon cancer.-Especially with rare occurrences.  However, advised to colonoscopy is necessary.  Advised screening test when coverage is available.  Will consider diagnostic colonoscopy if symptoms are not improved with healthcare changes.  - Schedule colonoscopy for next year, with anesthesia. Consider location at [place].    AI and/or dictation software was used for the creation of the note.    The longitudinal plan of care for the diagnosis(es)/condition(s) as documented were addressed during this visit. Due to the added complexity in care, I will continue to support Delbert in the subsequent management and with ongoing continuity of care.     Recheck in 3 months.      Subjective   Delbert is a 59 year old, presenting for the following health issues:  Hospital F/U        6/23/2025     2:33 PM   Additional Questions   Roomed by Jessica ROSS   Accompanied by N/A         6/23/2025     2:33 PM   Patient Reported Additional Medications   Patient reports taking the following new medications N/A     HPI        ED/UC Followup:    Facility:  [facility] Emergency Department  Date of visit: 06/19/2025  Reason for visit: Bilateral Pulmonary Embolism  Current Status: States he's feeling a bit better all things considered  History of Present Illness-    Patient was seen in clinic and referred to our acute diagnostic services where there was concern for pulmonary embolism with symptoms of shortness of breath.  Patient's vitals were stable in clinic however his  "D-dimer returned positive.  At the [facility] he was seen to have pulmonary emboli with right heart strain.  In the emergency department he was started on Xarelto as an outpatient with follow-up.  He was deemed stable and not needing interventional radiology for clot evacuation.  Patient is currently doing well with managing monitoring of his symptoms.  No acute changes in his disposition from last Thursday.      Delbert Dowling, 59 years    Pulmonary Embolism  - Experienced chest pain and was advised to go to [facility] for emergency care.  - Underwent a 6-minute walk test to assess stability.  - Previously had blood clots that were not evacuated but managed with medication.  - Currently on Xarelto for blood clot management.  This is his second pulmonary embolism.    Rectal Bleeding  - Noticed bright red blood in stool approximately 6 months ago.  - Recurrence of bright red blood after hard stools about a month later.  - No associated pain reported.    Diabetes  - Recently informed of being diabetic.  - Hospital records indicated prediabetes, but current status is diabetic.    Social History and Life Stressors  - Delbert is concerned about financial expenses related to his health care.  - He has a domestic partner, Madalyn, who is self-employed and uninsured.  - Delbert is focused on managing his health to avoid future complications.          Review of Systems  Constitutional, neuro, ENT, endocrine, pulmonary, cardiac, gastrointestinal, genitourinary, musculoskeletal, integument and psychiatric systems are negative, except as otherwise noted.      Objective    /84 (Cuff Size: Adult Large)   Pulse 61   Temp 97.8  F (36.6  C) (Temporal)   Resp 18   Ht 1.798 m (5' 10.79\")   Wt (!) 167.5 kg (369 lb 4.3 oz)   SpO2 98%   BMI 51.81 kg/m    Body mass index is 51.81 kg/m .  Physical Exam   GENERAL healthy, alert and no distress  EYES sclera clear, PERRLA  HENT: nose,mouth without ulcers or lesions, Normal ear canals, TM's pearly " grey, normal light reflex bilaterally   NECK: no adenopathy, no asymmetry, masses, or scars and thyroid normal to palpation  RESP: Clear to auscultation  CV: RRR, no murmur, no edema  NEURO: NEGATIVE, alert/oriented to person, location and time  PSYCH: normal pleasant affect    Foot was examined last week without any evidence of ulcerations or neuropathy.  Peripheral pulses were normal.  Chart review completed.        Signed Electronically by: NANDO Ellison CNP

## 2025-06-24 ENCOUNTER — MYC MEDICAL ADVICE (OUTPATIENT)
Dept: ANTICOAGULATION | Facility: CLINIC | Age: 60
End: 2025-06-24

## 2025-06-24 ENCOUNTER — TELEPHONE (OUTPATIENT)
Dept: ANTICOAGULATION | Facility: CLINIC | Age: 60
End: 2025-06-24

## 2025-06-24 DIAGNOSIS — I26.94 MULTIPLE SUBSEGMENTAL PULMONARY EMBOLI WITHOUT ACUTE COR PULMONALE (H): Primary | ICD-10-CM

## 2025-06-24 RX ORDER — WARFARIN SODIUM 5 MG/1
5 TABLET ORAL EVERY EVENING
Qty: 30 TABLET | Refills: 1 | Status: CANCELLED | OUTPATIENT
Start: 2025-06-24

## 2025-06-24 NOTE — TELEPHONE ENCOUNTER
"ANTICOAGULATION  MANAGEMENT: NEW REFERRAL      SUBJECTIVE/OBJECTIVE     Delbert Dowling, a 59 year old male  is newly referred to [facility].    Anticoagulation:    Previously on warfarin: No, has been on Rivaroxaban (Xarelto); transitioning to warfarin.  Warfarin initiation date (approximate): 25   Indication(s): Recurrent PE   Goal Range: 2.0-3.0   Anticoagulation Bridge/Overlap: Yes, overlapping with Rivaroxaban (Xarelto) until INR >= 2.5   Referring provider: from PCP    General Dietary/Social Hx:      Social History:   Social History     Tobacco Use    Smoking status: Former     Current packs/day: 0.00     Average packs/day: 1 pack/day for 20.0 years (20.0 ttl pk-yrs)     Types: Cigarettes     Start date: 7/15/1986     Quit date: 7/15/2006     Years since quittin.9    Smokeless tobacco: Never    Tobacco comments:     quit 13 years    Vaping Use    Vaping status: Never Used   Substance Use Topics    Alcohol use: Yes     Comment: once every 3 months     Drug use: No     Comment: none        In the past 2 weeks, patient estimates taking medications as instructed % of time:     Results:            Wt Readings from Last 2 Encounters:   25 (!) 167.5 kg (369 lb 4.3 oz)   25 (!) 167.5 kg (369 lb 4.8 oz)      Estimated body mass index is 51.81 kg/m  as calculated from the following:    Height as of 25: 1.798 m (5' 10.79\").    Weight as of 25: 167.5 kg (369 lb 4.3 oz).  Lab Results   Component Value Date    AST 32 2025    ALT 45 2025    ALBUMIN 4.3 2025     Lab Results   Component Value Date    CR 0.96 2025     Estimated Creatinine Clearance: 132.8 mL/min (based on SCr of 0.96 mg/dL).    ASSESSMENT     Goal INR 2-3, standard for indication(s) above  Establishing initial warfarin maintenance dose (on warfarin < 30 days)   Factors that may increase sensitivity to warfarin: Alcohol intake  Factors that may reduce sensitivity to warfarin: " Male Gender  Potential significant drug interactions with home medications: None noted  Starting warfarin dose is appropriate for patient's anticipated sensitivity to warfarin    PLAN     Dosing Instructions: None        Education provided:   Taking warfarin: purpose of warfarin and how it works, take warfarin at same time each day; preferably in the evening, and prescribed tablet strength and color  Goal range and lab monitoring: goal range and significance of current result, Importance of therapeutic range, and Importance of following up at instructed interval  Dietary considerations: importance of consistent vitamin K intake    Education still needed:   Taking warfarin: prescribed tablet strength and color and importance of following ACC instructions vs instructions on the prescription bottle  Healthy lifestyle considerations: potential interaction between warfarin and alcohol, limit alcohol intake to no more than 1 to 2 drinks in 24 hours if you choose to drink alcohol, and avoid activities that have a high risk for falling or injury such as contact sports      Telephone call with Delbert and discussed warfarin. Patient has read material sent via my chart. He would like a few days to make a decision if he would like to switch to warfarin or stay on Xarelto. Requested patient either respond back via my chart or to call [phone]. Advised to try to follow-up end of week.     Warfarin rx pended.     Contact [phone] to schedule and with any changes, questions or concerns.     Standing orders placed in Epic: Point of Care INR (Lab 5000)    Plan made per [facility] anticoagulation protocol    Destiny HIGGINS RN  Anticoagulation Clinic  6/24/2025

## 2025-06-24 NOTE — TELEPHONE ENCOUNTER
Paul Alcaraz,     Did you want the patient to overlap the Xarelto with the Warfarin?    Destiny Queen RN, BSN, N  Anticoagulation Clinic   [phone]

## 2025-06-26 RX ORDER — WARFARIN SODIUM 5 MG/1
TABLET ORAL
Qty: 135 TABLET | Refills: 0 | Status: SHIPPED | OUTPATIENT
Start: 2025-06-26

## 2025-06-26 NOTE — TELEPHONE ENCOUNTER
See TE from 6/24/25 for more information on start up.     Vivek Wlil RN, BSN  [facility] Anticoagulation Team

## 2025-06-26 NOTE — TELEPHONE ENCOUNTER
"Anticoagulation Management    Delbert Dowling, 59 year old, male is is on Xarelto and referred to transition to warfarin.    Indication for Anticoagulation: Acute PE  (25)    Goal INR Range: 2.0-3.0    Ethnicity:Not  or   Race: White    Wt Readings from Last 2 Encounters:   25 (!) 167.5 kg (369 lb 4.3 oz)   25 (!) 167.5 kg (369 lb 4.8 oz)     Estimated body mass index is 51.81 kg/m  as calculated from the following:    Height as of 25: 1.798 m (5' 10.79\").    Weight as of 25: 167.5 kg (369 lb 4.3 oz).      Tobacco Use      Smoking status: Former        Packs/day: 0.00        Years: 1 pack/day for 20.0 years (20.0 ttl pk-yrs)        Types: Cigarettes        Start date: 7/15/1986        Quit date: 7/15/2006        Years since quittin.9      Smokeless tobacco: Never      Tobacco comments: quit 13 years     Social History    Substance and Sexual Activity      Alcohol use: Yes        Comment: once every 3 months       No results found for: \"INR\"  Lab Results   Component Value Date    ALT 45 2025    AST 32 2025    ALBUMIN 4.3 2025     Lab Results   Component Value Date    HGB 15.1 2025    HCT 42.2 2025     2025     Lab Results   Component Value Date    CR 0.96 2025    CR 0.92 2025     Estimated Creatinine Clearance: 132.8 mL/min (based on SCr of 0.96 mg/dL).    No genomic indicators are on file for this patient that match these criteria.     RECOMMENDATION     When transitions from Xarelto 15 mg Bid to 20 mg daily (9 days left), start warfarin 7.5 mg daily    Overlap with Rivaroxaban (Xarelto) until INR >= 2.  Consider enoxaparin if INR <= 2 and out of Xarelto.  Check INR just before due for next DOAC dose and/or hold off on DOAC dose until after INR drawn on lab days.    Check INR 3-5 days after starting warfarin    Deidra Wellington, [facility]  Anticoagulation Clinic    "

## 2025-06-26 NOTE — TELEPHONE ENCOUNTER
I spoke with Delbert and went over start up plan below with him. Pt's preferred pharmacy is [facility] and did not want a small supply sent elsewhere. Education refreshed below and all questions answered. [facility] calendar updated with start date and INR recheck date. Patient verbalized understanding and agrees with plan of care. Pt had no further questions or concerns at this time.     Vivek Will RN, BSN  [facility] Anticoagulation Team

## 2025-07-01 ENCOUNTER — TELEPHONE (OUTPATIENT)
Dept: FAMILY MEDICINE | Facility: CLINIC | Age: 60
End: 2025-07-01

## 2025-07-01 NOTE — TELEPHONE ENCOUNTER
Spoke with patient and questions answered.    Radha Schilling RN  [facility] Anticoagulation [facility]

## 2025-07-01 NOTE — TELEPHONE ENCOUNTER
General Call    Contacts       Contact Date/Time Type Contact Phone/Fax    07/01/2025 08:09 AM CDT Phone (Incoming) Delbert Dowling (Self) [phone] (M)          Reason for Call:  Medication question    What are your questions or concerns:  Patient has questions about warfarin. He has not started his INR yet, but he is wondering about taking warfarin while currently taking other medications    Date of last appointment with provider: 06/23/25 with PCP    Could we send this information to you in [employer] or would you prefer to receive a phone call?:   No preference   Okay to leave a detailed message?: Yes at Cell number on file:    Telephone Information:   Mobile [phone]

## 2025-07-14 ENCOUNTER — ANTICOAGULATION THERAPY VISIT (OUTPATIENT)
Dept: ANTICOAGULATION | Facility: CLINIC | Age: 60
End: 2025-07-14

## 2025-07-14 ENCOUNTER — LAB (OUTPATIENT)
Dept: LAB | Facility: CLINIC | Age: 60
End: 2025-07-14

## 2025-07-14 ENCOUNTER — RESULTS FOLLOW-UP (OUTPATIENT)
Dept: ANTICOAGULATION | Facility: CLINIC | Age: 60
End: 2025-07-14

## 2025-07-14 DIAGNOSIS — E11.59 TYPE 2 DIABETES MELLITUS WITH OTHER CIRCULATORY COMPLICATION, WITHOUT LONG-TERM CURRENT USE OF INSULIN (H): Primary | ICD-10-CM

## 2025-07-14 DIAGNOSIS — I26.94 MULTIPLE SUBSEGMENTAL PULMONARY EMBOLI WITHOUT ACUTE COR PULMONALE (H): ICD-10-CM

## 2025-07-14 DIAGNOSIS — I26.94 MULTIPLE SUBSEGMENTAL PULMONARY EMBOLI WITHOUT ACUTE COR PULMONALE (H): Primary | ICD-10-CM

## 2025-07-14 LAB — INR BLD: 2.3 (ref 0.9–1.1)

## 2025-07-14 PROCEDURE — 36416 COLLJ CAPILLARY BLOOD SPEC: CPT

## 2025-07-14 PROCEDURE — 85610 PROTHROMBIN TIME: CPT

## 2025-07-14 NOTE — PROGRESS NOTES
ANTICOAGULATION MANAGEMENT     Delbert Dowling 59 year old male is on warfarin with therapeutic INR result. (Goal INR 2.0-3.0)    Recent labs: (last 7 days)     07/14/25  1104   INR 2.3*       ASSESSMENT     Warfarin Lab Questionnaire    Warfarin Doses Last 7 Days      7/14/2025    11:00 AM   Dose in Tablet or Mg   TAB or MG? milligram (mg)     Pt Rptd Dose SUNDAY MONDAY TUESDAY WED THURS FRIDAY SATURDAY 7/14/2025  11:00 AM 7.5 7.5 0 0 7.5 7.5 7.5         7/14/2025   Warfarin Lab Questionnaire   Missed doses within past 14 days? No   Changes in diet or alcohol within past 14 days? No   Medication changes since last result? No   Injuries or illness since last result? No   New shortness of breath, severe headaches or sudden changes in vision since last result? No   Abnormal bleeding since last result? No   Upcoming surgery, procedure? No   Best number to call with results? [phone]     Previous result: first INR  Additional findings: New start on day 5 of warfarin  Bridging with Xarelto until INR >= 2.0  Patient had taken both his warfarin and Xarelto this morning.  Advised to stop Xarelto now, only take warfarin and he will not take his warfarin on Wednesday until after we call him.        PLAN     Recommended plan for no diet, medication or health factor changes affecting INR     Dosing Instructions: Continue your current warfarin dose Stop bridging with Xarelto with next INR in 2 days       Summary  As of 7/14/2025      Full warfarin instructions:  7.5 mg every day   Next INR check:  7/16/2025               Telephone call with Delbert who verbalizes understanding and agrees to plan    Lab visit scheduled    Education provided: Goal range and lab monitoring: goal range and significance of current result  Contact [phone] with any changes, questions or concerns.     Plan made with [facility] Pharmacist Alexa Schilling, RN  7/14/2025  Anticoagulation Clinic  [email or web address] for routing messages: gary ROSS  DAMARIS  ACC patient phone line: [phone]        _______________________________________________________________________     Anticoagulation Episode Summary       Current INR goal:  2.0-3.0   TTR:  --   Target end date:  6/16/2026   Send INR reminders to:  VANDANA OCAMPO    Indications    Multiple subsegmental pulmonary emboli without acute cor pulmonale (H) [I26.94]             Comments:  --             Anticoagulation Care Providers       Provider Role Specialty Phone number    Lissa Gamez, APRN CNP Referring Family Medicine [phone]

## 2025-07-17 ENCOUNTER — ANTICOAGULATION THERAPY VISIT (OUTPATIENT)
Dept: ANTICOAGULATION | Facility: CLINIC | Age: 60
End: 2025-07-17

## 2025-07-17 ENCOUNTER — LAB (OUTPATIENT)
Dept: LAB | Facility: CLINIC | Age: 60
End: 2025-07-17

## 2025-07-17 ENCOUNTER — RESULTS FOLLOW-UP (OUTPATIENT)
Dept: ANTICOAGULATION | Facility: CLINIC | Age: 60
End: 2025-07-17

## 2025-07-17 DIAGNOSIS — I26.94 MULTIPLE SUBSEGMENTAL PULMONARY EMBOLI WITHOUT ACUTE COR PULMONALE (H): ICD-10-CM

## 2025-07-17 DIAGNOSIS — I26.94 MULTIPLE SUBSEGMENTAL PULMONARY EMBOLI WITHOUT ACUTE COR PULMONALE (H): Primary | ICD-10-CM

## 2025-07-17 LAB — INR BLD: 1.6 (ref 0.9–1.1)

## 2025-07-17 NOTE — PROGRESS NOTES
ANTICOAGULATION MANAGEMENT     Delbert Dowling 59 year old male is on warfarin with subtherapeutic INR result. (Goal INR 2.0-3.0)    Recent labs: (last 7 days)     07/17/25  1115   INR 1.6*       ASSESSMENT     Warfarin Lab Questionnaire    Warfarin Doses Last 7 Days      7/14/2025    11:00 AM   Dose in Tablet or Mg   TAB or MG? milligram (mg)     Pt Rptd Dose SUNDAY MONDAY TUESDAY WED THURS FRIDAY SATURDAY 7/14/2025  11:00 AM 7.5 7.5 0 0 7.5 7.5 7.5         7/14/2025   Warfarin Lab Questionnaire   Missed doses within past 14 days? No   Changes in diet or alcohol within past 14 days? No   Medication changes since last result? No   Injuries or illness since last result? No   New shortness of breath, severe headaches or sudden changes in vision since last result? No   Abnormal bleeding since last result? No   Upcoming surgery, procedure? No   Best number to call with results? [phone]     Previous result: therapeutic   Additional findings: New start on day 8 of warfarin       PLAN     Recommended plan for no diet, medication or health factor changes affecting INR     Dosing Instructions: Increase your warfarin dose (9.5% change) Start bridging with Xarelto with next INR in 4 days   he is unsure of amount of Xarelto he has left, he thinks 5 days he will let us know if he does not have enough until next recheck     Summary  As of 7/17/2025      Full warfarin instructions:  10 mg every Sun, Thu; 7.5 mg all other days   Next INR check:  7/21/2025               Telephone call with Delbert who verbalizes understanding and agrees to plan    Lab visit scheduled    Education provided: Goal range and lab monitoring: goal range and significance of current result  Importance of bridging with new clot    Plan made with [facility] Pharmacist Alexa Schilling, RN  7/17/2025  Anticoagulation Clinic  [employer] for routing messages: gary OCAMPO  [facility] patient phone line:  [phone]        _______________________________________________________________________     Anticoagulation Episode Summary       Current INR goal:  2.0-3.0   TTR:  42.9% (3 d)   Target end date:  6/16/2026   Send INR reminders to:  VANDANA OCAMPO    Indications    Multiple subsegmental pulmonary emboli without acute cor pulmonale (H) [I26.94]             Comments:  --             Anticoagulation Care Providers       Provider Role Specialty Phone number    Lissa Gamez, APRN CNP Referring Family Medicine [phone]

## 2025-07-21 ENCOUNTER — RESULTS FOLLOW-UP (OUTPATIENT)
Dept: ANTICOAGULATION | Facility: CLINIC | Age: 60
End: 2025-07-21

## 2025-07-21 ENCOUNTER — ANTICOAGULATION THERAPY VISIT (OUTPATIENT)
Dept: ANTICOAGULATION | Facility: CLINIC | Age: 60
End: 2025-07-21

## 2025-07-21 ENCOUNTER — LAB (OUTPATIENT)
Dept: LAB | Facility: CLINIC | Age: 60
End: 2025-07-21

## 2025-07-21 DIAGNOSIS — E11.59 TYPE 2 DIABETES MELLITUS WITH OTHER CIRCULATORY COMPLICATION, WITHOUT LONG-TERM CURRENT USE OF INSULIN (H): ICD-10-CM

## 2025-07-21 DIAGNOSIS — I26.94 MULTIPLE SUBSEGMENTAL PULMONARY EMBOLI WITHOUT ACUTE COR PULMONALE (H): Primary | ICD-10-CM

## 2025-07-21 DIAGNOSIS — I26.94 MULTIPLE SUBSEGMENTAL PULMONARY EMBOLI WITHOUT ACUTE COR PULMONALE (H): ICD-10-CM

## 2025-07-21 LAB — INR BLD: 2 (ref 0.9–1.1)

## 2025-07-21 PROCEDURE — 85610 PROTHROMBIN TIME: CPT

## 2025-07-21 PROCEDURE — 82043 UR ALBUMIN QUANTITATIVE: CPT

## 2025-07-21 PROCEDURE — 36415 COLL VENOUS BLD VENIPUNCTURE: CPT

## 2025-07-21 PROCEDURE — 82570 ASSAY OF URINE CREATININE: CPT

## 2025-07-21 NOTE — PROGRESS NOTES
ANTICOAGULATION MANAGEMENT     Delbert Dowling 59 year old male is on warfarin with therapeutic INR result. (Goal INR 2.0-3.0)    Recent labs: (last 7 days)     07/21/25  1108   INR 2.0*       ASSESSMENT     Source(s): Chart Review and Patient/Caregiver Call     Warfarin doses taken: Warfarin taken as instructed  Diet: No new diet changes identified  Medication/supplement changes: None noted  New illness, injury, or hospitalization: No  Signs or symptoms of bleeding or clotting: No  Previous result: Subtherapeutic  Additional findings: New start on day 12 of warfarin is now out of Xarelto        PLAN     Recommended plan for no diet, medication or health factor changes affecting INR     Dosing Instructions: Increase your warfarin dose (13% change) with next INR in 4 days       Summary  As of 7/21/2025      Full warfarin instructions:  7.5 mg every Sun, Thu; 10 mg all other days   Next INR check:  7/25/2025               Telephone call with Delbert who verbalizes understanding and agrees to plan    Lab visit scheduled    Education provided: Goal range and lab monitoring: goal range and significance of current result  Contact [phone] with any changes, questions or concerns.     Plan made with [facility] Pharmacist Alexa Schilling, RN  7/21/2025  Anticoagulation Clinic  [email or web address] for routing messages: gary OCAMPO  [facility] patient phone line: [phone]        _______________________________________________________________________     Anticoagulation Episode Summary       Current INR goal:  2.0-3.0   TTR:  18.4% (1 wk)   Target end date:  6/16/2026   Send INR reminders to:  VANDANA OCAMPO    Indications    Multiple subsegmental pulmonary emboli without acute cor pulmonale (H) [I26.94]             Comments:  --             Anticoagulation Care Providers       Provider Role Specialty Phone number    Lissa Gamez, NANDO CNP Referring Family Medicine [phone]

## 2025-07-22 LAB
CREAT UR-MCNC: 199 MG/DL
MICROALBUMIN UR-MCNC: 120 MG/L
MICROALBUMIN/CREAT UR: 60.3 MG/G CR (ref 0–17)

## 2025-08-12 ENCOUNTER — LAB (OUTPATIENT)
Dept: LAB | Facility: CLINIC | Age: 60
End: 2025-08-12

## 2025-08-12 ENCOUNTER — ANTICOAGULATION THERAPY VISIT (OUTPATIENT)
Dept: ANTICOAGULATION | Facility: CLINIC | Age: 60
End: 2025-08-12

## 2025-08-12 ENCOUNTER — RESULTS FOLLOW-UP (OUTPATIENT)
Dept: ANTICOAGULATION | Facility: CLINIC | Age: 60
End: 2025-08-12

## 2025-08-12 DIAGNOSIS — I26.94 MULTIPLE SUBSEGMENTAL PULMONARY EMBOLI WITHOUT ACUTE COR PULMONALE (H): Primary | ICD-10-CM

## 2025-08-12 DIAGNOSIS — I26.94 MULTIPLE SUBSEGMENTAL PULMONARY EMBOLI WITHOUT ACUTE COR PULMONALE (H): ICD-10-CM

## 2025-08-12 LAB — INR BLD: 2.9 (ref 0.9–1.1)

## 2025-08-12 PROCEDURE — 36415 COLL VENOUS BLD VENIPUNCTURE: CPT

## 2025-08-12 PROCEDURE — 85610 PROTHROMBIN TIME: CPT

## 2025-08-25 LAB
INR BLD: 2 (ref 0.9–1.1)
INR BLD: 2.9 (ref 0.9–1.1)

## 2025-08-26 ENCOUNTER — RESULTS FOLLOW-UP (OUTPATIENT)
Dept: ANTICOAGULATION | Facility: CLINIC | Age: 60
End: 2025-08-26

## 2025-08-26 ENCOUNTER — LAB (OUTPATIENT)
Dept: LAB | Facility: CLINIC | Age: 60
End: 2025-08-26

## 2025-08-26 ENCOUNTER — ANTICOAGULATION THERAPY VISIT (OUTPATIENT)
Dept: ANTICOAGULATION | Facility: CLINIC | Age: 60
End: 2025-08-26

## 2025-08-26 DIAGNOSIS — I26.94 MULTIPLE SUBSEGMENTAL PULMONARY EMBOLI WITHOUT ACUTE COR PULMONALE (H): ICD-10-CM

## 2025-08-26 DIAGNOSIS — I26.94 MULTIPLE SUBSEGMENTAL PULMONARY EMBOLI WITHOUT ACUTE COR PULMONALE (H): Primary | ICD-10-CM

## 2025-08-26 LAB — INR BLD: 2.6 (ref 0.9–1.1)

## 2025-08-26 PROCEDURE — 36416 COLLJ CAPILLARY BLOOD SPEC: CPT

## 2025-08-26 PROCEDURE — 85610 PROTHROMBIN TIME: CPT

## 2025-08-28 DIAGNOSIS — I10 ESSENTIAL HYPERTENSION: ICD-10-CM

## 2025-08-28 RX ORDER — METOPROLOL SUCCINATE 100 MG/1
100 TABLET, EXTENDED RELEASE ORAL DAILY
Qty: 90 TABLET | Refills: 2 | OUTPATIENT
Start: 2025-08-28

## (undated) DEVICE — GLOVE ESTEEM BLUE W/NEU-THERA 8.0  2D73PB80

## (undated) DEVICE — CAST PLASTER SPLINT 5X30" 7395

## (undated) DEVICE — CAST PADDING 6" WEBRIL STERILE

## (undated) DEVICE — DRSG ABDOMINAL 07 1/2X8" 7197D

## (undated) DEVICE — PACK EXTREMITY SOP15EXFSD

## (undated) DEVICE — SOL NACL 0.9% IRRIG 1000ML BOTTLE 07138-09

## (undated) DEVICE — DRAPE C-ARM

## (undated) DEVICE — DRAPE C-ARMOR 5 SIDED 5523

## (undated) DEVICE — DRAPE CONVERTORS U-DRAPE 60X72" 8476

## (undated) DEVICE — GLOVE PROTEXIS W/NEU-THERA 7.5  2D73TE75

## (undated) DEVICE — SU VICRYL 2-0 CP-2 18" UND J762D

## (undated) DEVICE — DRSG GAUZE 4X4" TRAY

## (undated) DEVICE — SUCTION TIP YANKAUER W/O VENT BULBOUS TIP

## (undated) DEVICE — CAST PADDING 6" COTTON WEBRIL UNSTERILE 9086

## (undated) DEVICE — PREP CHLORAPREP 26ML TINTED ORANGE  260815

## (undated) DEVICE — ESU GROUND PAD UNIVERSAL W/O CORD

## (undated) DEVICE — BNDG ELASTIC 6" DBL LENGTH UNSTERILE 6611-16

## (undated) DEVICE — DRSG XEROFORM 1X8"

## (undated) DEVICE — BNDG COBAN 4"X5YDS STERILE

## (undated) DEVICE — DRAPE STERI U 1015

## (undated) DEVICE — STPL SKIN 35W APPOSE 8886803712

## (undated) DEVICE — IMP SCR ARTHREX LP CANC 4.0X16MM SS AR-8840-16
Type: IMPLANTABLE DEVICE | Site: FIBULA | Status: NON-FUNCTIONAL
Removed: 2019-03-22

## (undated) RX ORDER — FENTANYL CITRATE 50 UG/ML
INJECTION, SOLUTION INTRAMUSCULAR; INTRAVENOUS
Status: DISPENSED
Start: 2019-03-22

## (undated) RX ORDER — BUPIVACAINE HYDROCHLORIDE 5 MG/ML
INJECTION, SOLUTION EPIDURAL; INTRACAUDAL
Status: DISPENSED
Start: 2019-03-22

## (undated) RX ORDER — KETOROLAC TROMETHAMINE 30 MG/ML
INJECTION, SOLUTION INTRAMUSCULAR; INTRAVENOUS
Status: DISPENSED
Start: 2019-03-22

## (undated) RX ORDER — HYDRALAZINE HYDROCHLORIDE 20 MG/ML
INJECTION INTRAMUSCULAR; INTRAVENOUS
Status: DISPENSED
Start: 2019-03-22

## (undated) RX ORDER — FENTANYL CITRATE 50 UG/ML
INJECTION, SOLUTION INTRAMUSCULAR; INTRAVENOUS
Status: DISPENSED
Start: 2018-05-07

## (undated) RX ORDER — ONDANSETRON 2 MG/ML
INJECTION INTRAMUSCULAR; INTRAVENOUS
Status: DISPENSED
Start: 2019-03-22

## (undated) RX ORDER — BACITRACIN 50000 [IU]/1
INJECTION, POWDER, FOR SOLUTION INTRAMUSCULAR
Status: DISPENSED
Start: 2019-03-22

## (undated) RX ORDER — LIDOCAINE HYDROCHLORIDE 20 MG/ML
INJECTION, SOLUTION EPIDURAL; INFILTRATION; INTRACAUDAL; PERINEURAL
Status: DISPENSED
Start: 2019-03-22

## (undated) RX ORDER — DEXAMETHASONE SODIUM PHOSPHATE 10 MG/ML
INJECTION, SOLUTION INTRAMUSCULAR; INTRAVENOUS
Status: DISPENSED
Start: 2019-03-22

## (undated) RX ORDER — HYDROMORPHONE HYDROCHLORIDE 1 MG/ML
INJECTION, SOLUTION INTRAMUSCULAR; INTRAVENOUS; SUBCUTANEOUS
Status: DISPENSED
Start: 2019-03-22

## (undated) RX ORDER — PROPOFOL 10 MG/ML
INJECTION, EMULSION INTRAVENOUS
Status: DISPENSED
Start: 2019-03-22